# Patient Record
Sex: MALE | Race: WHITE | NOT HISPANIC OR LATINO
[De-identification: names, ages, dates, MRNs, and addresses within clinical notes are randomized per-mention and may not be internally consistent; named-entity substitution may affect disease eponyms.]

---

## 2017-06-28 ENCOUNTER — NON-APPOINTMENT (OUTPATIENT)
Age: 62
End: 2017-06-28

## 2017-06-28 ENCOUNTER — APPOINTMENT (OUTPATIENT)
Dept: FAMILY MEDICINE | Facility: CLINIC | Age: 62
End: 2017-06-28

## 2017-06-28 ENCOUNTER — LABORATORY RESULT (OUTPATIENT)
Age: 62
End: 2017-06-28

## 2017-06-28 VITALS
BODY MASS INDEX: 26.64 KG/M2 | HEIGHT: 66 IN | SYSTOLIC BLOOD PRESSURE: 130 MMHG | DIASTOLIC BLOOD PRESSURE: 74 MMHG | WEIGHT: 165.75 LBS

## 2017-06-28 DIAGNOSIS — D48.5 NEOPLASM OF UNCERTAIN BEHAVIOR OF SKIN: ICD-10-CM

## 2017-06-28 DIAGNOSIS — Z80.8 FAMILY HISTORY OF MALIGNANT NEOPLASM OF OTHER ORGANS OR SYSTEMS: ICD-10-CM

## 2017-06-28 DIAGNOSIS — R68.89 OTHER GENERAL SYMPTOMS AND SIGNS: ICD-10-CM

## 2017-06-28 DIAGNOSIS — Z82.49 FAMILY HISTORY OF ISCHEMIC HEART DISEASE AND OTHER DISEASES OF THE CIRCULATORY SYSTEM: ICD-10-CM

## 2017-06-28 LAB
BILIRUB UR QL STRIP: NORMAL
CLARITY UR: CLEAR
COLLECTION METHOD: NORMAL
GLUCOSE UR-MCNC: NORMAL
HCG UR QL: 0.2 EU/DL
HGB UR QL STRIP.AUTO: NORMAL
KETONES UR-MCNC: NORMAL
LEUKOCYTE ESTERASE UR QL STRIP: NORMAL
NITRITE UR QL STRIP: NORMAL
PH UR STRIP: 7
PROT UR STRIP-MCNC: NORMAL
SP GR UR STRIP: 1.02

## 2017-06-29 LAB
BASOPHILS # BLD AUTO: 0.02 K/UL
BASOPHILS NFR BLD AUTO: 0.3 %
EOSINOPHIL # BLD AUTO: 0.23 K/UL
EOSINOPHIL NFR BLD AUTO: 3 %
HCT VFR BLD CALC: 46.7 %
HGB BLD-MCNC: 14.6 G/DL
IMM GRANULOCYTES NFR BLD AUTO: 0.3 %
LYMPHOCYTES # BLD AUTO: 1.06 K/UL
LYMPHOCYTES NFR BLD AUTO: 13.7 %
MAN DIFF?: NORMAL
MCHC RBC-ENTMCNC: 28.7 PG
MCHC RBC-ENTMCNC: 31.3 GM/DL
MCV RBC AUTO: 91.7 FL
MONOCYTES # BLD AUTO: 0.74 K/UL
MONOCYTES NFR BLD AUTO: 9.6 %
NEUTROPHILS # BLD AUTO: 5.64 K/UL
NEUTROPHILS NFR BLD AUTO: 73.1 %
PLATELET # BLD AUTO: 295 K/UL
RBC # BLD: 5.09 M/UL
RBC # FLD: 14.1 %
T3RU NFR SERPL: 0.96 INDEX
THYROGLOB AB SERPL-ACNC: <20 IU/ML
THYROPEROXIDASE AB SERPL IA-ACNC: 15.9 IU/ML
TSH SERPL-ACNC: 2.15 UIU/ML
WBC # FLD AUTO: 7.71 K/UL

## 2017-07-14 ENCOUNTER — MEDICATION RENEWAL (OUTPATIENT)
Age: 62
End: 2017-07-14

## 2017-07-17 ENCOUNTER — APPOINTMENT (OUTPATIENT)
Dept: CARDIOLOGY | Facility: CLINIC | Age: 62
End: 2017-07-17

## 2017-07-17 VITALS
OXYGEN SATURATION: 97 % | DIASTOLIC BLOOD PRESSURE: 78 MMHG | WEIGHT: 168 LBS | BODY MASS INDEX: 27.12 KG/M2 | HEART RATE: 106 BPM | SYSTOLIC BLOOD PRESSURE: 161 MMHG

## 2017-07-25 ENCOUNTER — OUTPATIENT (OUTPATIENT)
Dept: OUTPATIENT SERVICES | Facility: HOSPITAL | Age: 62
LOS: 1 days | End: 2017-07-25
Payer: MEDICARE

## 2017-07-25 ENCOUNTER — APPOINTMENT (OUTPATIENT)
Dept: ULTRASOUND IMAGING | Facility: CLINIC | Age: 62
End: 2017-07-25

## 2017-07-25 DIAGNOSIS — Z82.49 FAMILY HISTORY OF ISCHEMIC HEART DISEASE AND OTHER DISEASES OF THE CIRCULATORY SYSTEM: ICD-10-CM

## 2017-07-25 PROCEDURE — 76700 US EXAM ABDOM COMPLETE: CPT

## 2017-07-28 ENCOUNTER — APPOINTMENT (OUTPATIENT)
Dept: CARDIOLOGY | Facility: CLINIC | Age: 62
End: 2017-07-28
Payer: MEDICARE

## 2017-07-28 PROCEDURE — 93306 TTE W/DOPPLER COMPLETE: CPT

## 2017-07-31 ENCOUNTER — RESULT REVIEW (OUTPATIENT)
Age: 62
End: 2017-07-31

## 2017-08-04 ENCOUNTER — EMERGENCY (EMERGENCY)
Facility: HOSPITAL | Age: 62
LOS: 1 days | Discharge: HOME | End: 2017-08-04
Attending: EMERGENCY MEDICINE | Admitting: EMERGENCY MEDICINE
Payer: MEDICARE

## 2017-08-04 VITALS
TEMPERATURE: 98 F | SYSTOLIC BLOOD PRESSURE: 124 MMHG | DIASTOLIC BLOOD PRESSURE: 76 MMHG | RESPIRATION RATE: 20 BRPM | HEART RATE: 108 BPM | OXYGEN SATURATION: 97 %

## 2017-08-04 VITALS
TEMPERATURE: 99 F | HEART RATE: 133 BPM | OXYGEN SATURATION: 95 % | WEIGHT: 167.99 LBS | RESPIRATION RATE: 21 BRPM | SYSTOLIC BLOOD PRESSURE: 123 MMHG | DIASTOLIC BLOOD PRESSURE: 67 MMHG | HEIGHT: 66 IN

## 2017-08-04 PROCEDURE — 99284 EMERGENCY DEPT VISIT MOD MDM: CPT

## 2017-08-04 PROCEDURE — 93005 ELECTROCARDIOGRAM TRACING: CPT

## 2017-08-04 PROCEDURE — 99283 EMERGENCY DEPT VISIT LOW MDM: CPT | Mod: 25

## 2017-08-04 PROCEDURE — 93010 ELECTROCARDIOGRAM REPORT: CPT

## 2017-08-04 RX ORDER — HYDROXYZINE HCL 10 MG
25 TABLET ORAL ONCE
Qty: 0 | Refills: 0 | Status: COMPLETED | OUTPATIENT
Start: 2017-08-04 | End: 2017-08-04

## 2017-08-04 RX ADMIN — Medication 25 MILLIGRAM(S): at 13:15

## 2017-08-04 NOTE — ED ADULT TRIAGE NOTE - CHIEF COMPLAINT QUOTE
patient c/o fall at home. as per EMS patient has been falling more frequently. patient was found with rapid heart rate. denies any chest pain/SOB. stated does feel heart beating a "little fast". Patient is alert and oriented x3, Aide is suppose to come to the Hospital

## 2017-08-04 NOTE — ED PROVIDER NOTE - CONSTITUTIONAL, MLM
normal... Well appearing, well nourished, awake, alert, oriented to person, place, time/situation and in MODERATE PSYCHIATRIC apparent distress.

## 2017-08-04 NOTE — ED PROVIDER NOTE - OBJECTIVE STATEMENT
62 YO MALE WITH ANXIETY ATTACK. PT WENT OUT TO GET THE MAIL AND TRIPPED AND FELL DOWN ONTO HIS KNEES. HE GOT ANXIOUS AND COULDN'T GET UP. NO OTHER TRAUMA TO PERSON. NO OTHER COMPALINTS

## 2017-08-04 NOTE — ED PROVIDER NOTE - PSYCHIATRIC, MLM
Alert and oriented to person, place, time/situation. ANXIOUS mood and affect. no apparent risk to self or others.

## 2017-08-04 NOTE — ED PROVIDER NOTE - CHPI ED SYMPTOMS NEG
no disorientation/no suicidal/no agitation/no weakness/no change in level of consciousness/no confusion/no homicidal/no paranoia/no hallucinations

## 2017-08-04 NOTE — ED PROVIDER NOTE - MEDICAL DECISION MAKING DETAILS
PT WITH ANXIETY PRESENTS WITH ANXIETY S/P TRIP AND FALL, LANDING ON KNEES. FEELS BETTER. HR UP. AIDE NOW PRESENT. WILL GIVE VISTARIL AND LIKELY DISCHARGE

## 2017-08-04 NOTE — ED ADULT NURSE NOTE - OBJECTIVE STATEMENT
patient was involved in fall to as per patient because of a panic attack. aide at the bedside, food and medication given

## 2017-08-11 ENCOUNTER — APPOINTMENT (OUTPATIENT)
Dept: FAMILY MEDICINE | Facility: CLINIC | Age: 62
End: 2017-08-11
Payer: MEDICARE

## 2017-08-11 VITALS
SYSTOLIC BLOOD PRESSURE: 143 MMHG | WEIGHT: 175 LBS | HEIGHT: 66 IN | DIASTOLIC BLOOD PRESSURE: 79 MMHG | BODY MASS INDEX: 28.12 KG/M2

## 2017-08-11 VITALS — DIASTOLIC BLOOD PRESSURE: 80 MMHG | SYSTOLIC BLOOD PRESSURE: 138 MMHG

## 2017-08-11 PROCEDURE — 99214 OFFICE O/P EST MOD 30 MIN: CPT

## 2017-08-11 RX ORDER — SIMVASTATIN 40 MG/1
40 TABLET, FILM COATED ORAL
Refills: 0 | Status: DISCONTINUED | COMMUNITY
End: 2017-08-11

## 2017-09-14 ENCOUNTER — APPOINTMENT (OUTPATIENT)
Dept: FAMILY MEDICINE | Facility: CLINIC | Age: 62
End: 2017-09-14
Payer: MEDICARE

## 2017-09-14 VITALS
HEIGHT: 66 IN | BODY MASS INDEX: 27.56 KG/M2 | DIASTOLIC BLOOD PRESSURE: 74 MMHG | WEIGHT: 171.5 LBS | SYSTOLIC BLOOD PRESSURE: 152 MMHG

## 2017-09-14 VITALS — DIASTOLIC BLOOD PRESSURE: 70 MMHG | SYSTOLIC BLOOD PRESSURE: 138 MMHG

## 2017-09-14 DIAGNOSIS — Z60.2 PROBLEMS RELATED TO LIVING ALONE: ICD-10-CM

## 2017-09-14 PROCEDURE — 99213 OFFICE O/P EST LOW 20 MIN: CPT | Mod: 25

## 2017-09-14 PROCEDURE — 90686 IIV4 VACC NO PRSV 0.5 ML IM: CPT

## 2017-09-14 PROCEDURE — 90688 IIV4 VACCINE SPLT 0.5 ML IM: CPT

## 2017-09-14 PROCEDURE — G0008: CPT

## 2017-09-14 RX ORDER — ATORVASTATIN CALCIUM 20 MG/1
20 TABLET, FILM COATED ORAL DAILY
Qty: 90 | Refills: 0 | Status: DISCONTINUED | COMMUNITY
Start: 2017-08-11 | End: 2017-09-14

## 2017-09-14 SDOH — SOCIAL STABILITY - SOCIAL INSECURITY: PROBLEMS RELATED TO LIVING ALONE: Z60.2

## 2017-09-18 PROBLEM — Z60.2 LIVING ALONE: Status: ACTIVE | Noted: 2017-09-18

## 2017-09-28 ENCOUNTER — APPOINTMENT (OUTPATIENT)
Dept: FAMILY MEDICINE | Facility: CLINIC | Age: 62
End: 2017-09-28

## 2017-11-16 ENCOUNTER — APPOINTMENT (OUTPATIENT)
Dept: FAMILY MEDICINE | Facility: CLINIC | Age: 62
End: 2017-11-16
Payer: MEDICARE

## 2017-11-16 VITALS
HEART RATE: 104 BPM | WEIGHT: 176 LBS | HEIGHT: 66 IN | OXYGEN SATURATION: 94 % | BODY MASS INDEX: 28.28 KG/M2 | DIASTOLIC BLOOD PRESSURE: 74 MMHG | SYSTOLIC BLOOD PRESSURE: 158 MMHG

## 2017-11-16 VITALS — SYSTOLIC BLOOD PRESSURE: 138 MMHG | DIASTOLIC BLOOD PRESSURE: 60 MMHG

## 2017-11-16 PROCEDURE — 99213 OFFICE O/P EST LOW 20 MIN: CPT

## 2017-11-16 RX ORDER — HYDROCHLOROTHIAZIDE 12.5 MG/1
12.5 CAPSULE ORAL DAILY
Qty: 30 | Refills: 0 | Status: COMPLETED | COMMUNITY
End: 2017-11-16

## 2018-01-09 ENCOUNTER — LABORATORY RESULT (OUTPATIENT)
Age: 63
End: 2018-01-09

## 2018-01-09 ENCOUNTER — APPOINTMENT (OUTPATIENT)
Dept: FAMILY MEDICINE | Facility: CLINIC | Age: 63
End: 2018-01-09
Payer: MEDICARE

## 2018-01-09 VITALS
HEART RATE: 107 BPM | DIASTOLIC BLOOD PRESSURE: 76 MMHG | SYSTOLIC BLOOD PRESSURE: 148 MMHG | WEIGHT: 182 LBS | HEIGHT: 66 IN | BODY MASS INDEX: 29.25 KG/M2 | OXYGEN SATURATION: 98 %

## 2018-01-09 DIAGNOSIS — Z92.29 PERSONAL HISTORY OF OTHER DRUG THERAPY: ICD-10-CM

## 2018-01-09 PROCEDURE — 99214 OFFICE O/P EST MOD 30 MIN: CPT | Mod: 25

## 2018-01-09 PROCEDURE — 36415 COLL VENOUS BLD VENIPUNCTURE: CPT

## 2018-01-09 PROCEDURE — 93000 ELECTROCARDIOGRAM COMPLETE: CPT

## 2018-01-12 LAB
ALBUMIN SERPL ELPH-MCNC: 4.7 G/DL
ALP BLD-CCNC: 112 U/L
ALT SERPL-CCNC: 23 U/L
ANION GAP SERPL CALC-SCNC: 16 MMOL/L
AST SERPL-CCNC: 12 U/L
BASOPHILS # BLD AUTO: 0.02 K/UL
BASOPHILS NFR BLD AUTO: 0.2 %
BILIRUB SERPL-MCNC: 0.7 MG/DL
BUN SERPL-MCNC: 21 MG/DL
CALCIUM SERPL-MCNC: 9.8 MG/DL
CHLORIDE SERPL-SCNC: 100 MMOL/L
CHOLEST SERPL-MCNC: 209 MG/DL
CHOLEST/HDLC SERPL: 3.9 RATIO
CO2 SERPL-SCNC: 26 MMOL/L
CREAT SERPL-MCNC: 0.78 MG/DL
EOSINOPHIL # BLD AUTO: 0.32 K/UL
EOSINOPHIL NFR BLD AUTO: 3.2 %
GLUCOSE SERPL-MCNC: 85 MG/DL
HCT VFR BLD CALC: 47.7 %
HDLC SERPL-MCNC: 54 MG/DL
HGB BLD-MCNC: 15.7 G/DL
IMM GRANULOCYTES NFR BLD AUTO: 0.6 %
LDLC SERPL CALC-MCNC: 129 MG/DL
LYMPHOCYTES # BLD AUTO: 1.1 K/UL
LYMPHOCYTES NFR BLD AUTO: 11 %
MAGNESIUM SERPL-MCNC: 2.3 MG/DL
MAN DIFF?: NORMAL
MCHC RBC-ENTMCNC: 29.6 PG
MCHC RBC-ENTMCNC: 32.9 GM/DL
MCV RBC AUTO: 89.8 FL
MONOCYTES # BLD AUTO: 1 K/UL
MONOCYTES NFR BLD AUTO: 10 %
NEUTROPHILS # BLD AUTO: 7.48 K/UL
NEUTROPHILS NFR BLD AUTO: 75 %
PLATELET # BLD AUTO: 343 K/UL
POTASSIUM SERPL-SCNC: 4.3 MMOL/L
PROT SERPL-MCNC: 8.1 G/DL
RBC # BLD: 5.31 M/UL
RBC # FLD: 13.8 %
SODIUM SERPL-SCNC: 142 MMOL/L
T3RU NFR SERPL: 1.01 INDEX
T4 FREE SERPL-MCNC: 1.4 NG/DL
THYROGLOB AB SERPL-ACNC: <20 IU/ML
THYROPEROXIDASE AB SERPL IA-ACNC: 11.7 IU/ML
TRIGL SERPL-MCNC: 128 MG/DL
TSH SERPL-ACNC: 2.67 UIU/ML
WBC # FLD AUTO: 9.98 K/UL

## 2018-02-15 ENCOUNTER — APPOINTMENT (OUTPATIENT)
Dept: FAMILY MEDICINE | Facility: CLINIC | Age: 63
End: 2018-02-15
Payer: MEDICARE

## 2018-02-15 VITALS
WEIGHT: 188 LBS | HEIGHT: 66 IN | BODY MASS INDEX: 30.22 KG/M2 | SYSTOLIC BLOOD PRESSURE: 118 MMHG | OXYGEN SATURATION: 91 % | DIASTOLIC BLOOD PRESSURE: 70 MMHG | HEART RATE: 88 BPM

## 2018-02-15 PROCEDURE — 99214 OFFICE O/P EST MOD 30 MIN: CPT

## 2018-02-20 ENCOUNTER — MEDICATION RENEWAL (OUTPATIENT)
Age: 63
End: 2018-02-20

## 2018-04-17 ENCOUNTER — APPOINTMENT (OUTPATIENT)
Dept: FAMILY MEDICINE | Facility: CLINIC | Age: 63
End: 2018-04-17
Payer: MEDICARE

## 2018-04-17 VITALS
BODY MASS INDEX: 29.73 KG/M2 | WEIGHT: 185 LBS | DIASTOLIC BLOOD PRESSURE: 81 MMHG | HEART RATE: 88 BPM | OXYGEN SATURATION: 96 % | HEIGHT: 66 IN | SYSTOLIC BLOOD PRESSURE: 144 MMHG

## 2018-04-17 VITALS — SYSTOLIC BLOOD PRESSURE: 130 MMHG | DIASTOLIC BLOOD PRESSURE: 62 MMHG

## 2018-04-17 PROCEDURE — 99214 OFFICE O/P EST MOD 30 MIN: CPT

## 2018-04-17 RX ORDER — ATENOLOL 50 MG/1
50 TABLET ORAL
Qty: 30 | Refills: 0 | Status: DISCONTINUED | COMMUNITY
Start: 2018-02-20 | End: 2018-04-17

## 2018-07-18 ENCOUNTER — APPOINTMENT (OUTPATIENT)
Dept: FAMILY MEDICINE | Facility: CLINIC | Age: 63
End: 2018-07-18
Payer: MEDICARE

## 2018-07-18 VITALS
DIASTOLIC BLOOD PRESSURE: 70 MMHG | RESPIRATION RATE: 16 BRPM | HEIGHT: 66 IN | OXYGEN SATURATION: 97 % | BODY MASS INDEX: 30.05 KG/M2 | WEIGHT: 187 LBS | SYSTOLIC BLOOD PRESSURE: 126 MMHG | HEART RATE: 81 BPM

## 2018-07-18 PROCEDURE — 99214 OFFICE O/P EST MOD 30 MIN: CPT | Mod: 25

## 2018-07-18 PROCEDURE — 36415 COLL VENOUS BLD VENIPUNCTURE: CPT

## 2018-07-18 NOTE — PHYSICAL EXAM
[No Acute Distress] : no acute distress [Normal Sclera/Conjunctiva] : normal sclera/conjunctiva [Normal Oropharynx] : the oropharynx was normal [Supple] : supple [No Lymphadenopathy] : no lymphadenopathy [No Respiratory Distress] : no respiratory distress  [Clear to Auscultation] : lungs were clear to auscultation bilaterally [Normal Rate] : normal [Rhythm Regular] : regular [Normal S1] : normal S1 [Normal S2] : normal S2 [II] : a grade 2 [Normal Anterior Cervical Nodes] : no anterior cervical lymphadenopathy [Normal Gait] : normal gait [Coordination Grossly Intact] : coordination grossly intact [No Focal Deficits] : no focal deficits [Normal Mood] : the mood was normal [de-identified] : Mild edema of the LEs b/l.

## 2018-07-18 NOTE — HISTORY OF PRESENT ILLNESS
[FreeTextEntry1] : Patient at office for follow up, requesting renewal on Atorvastatin. Per last office visit note, patient to have fasting BW. [de-identified] : Getting outpt PT 2x/wk. Feels more confident when walking since starting PT.

## 2018-07-18 NOTE — PLAN
[FreeTextEntry1] : Pt is stable.\par Pneumovax recommended. Guardian consent being sought. Paperwork provided for pt. Will administer at f/u.

## 2018-07-20 LAB
25(OH)D3 SERPL-MCNC: 19.6 NG/ML
ALBUMIN SERPL ELPH-MCNC: 4 G/DL
ALP BLD-CCNC: 86 U/L
ALT SERPL-CCNC: 19 U/L
ANION GAP SERPL CALC-SCNC: 12 MMOL/L
AST SERPL-CCNC: 16 U/L
BASOPHILS # BLD AUTO: 0.01 K/UL
BASOPHILS NFR BLD AUTO: 0.1 %
BILIRUB SERPL-MCNC: 0.5 MG/DL
BUN SERPL-MCNC: 17 MG/DL
CALCIUM SERPL-MCNC: 9.4 MG/DL
CHLORIDE SERPL-SCNC: 105 MMOL/L
CHOLEST SERPL-MCNC: 164 MG/DL
CHOLEST/HDLC SERPL: 3.7 RATIO
CO2 SERPL-SCNC: 21 MMOL/L
CREAT SERPL-MCNC: 0.67 MG/DL
EOSINOPHIL # BLD AUTO: 0.26 K/UL
EOSINOPHIL NFR BLD AUTO: 3.1 %
GLUCOSE SERPL-MCNC: 75 MG/DL
HCT VFR BLD CALC: 45 %
HDLC SERPL-MCNC: 44 MG/DL
HGB BLD-MCNC: 14.8 G/DL
IMM GRANULOCYTES NFR BLD AUTO: 0.4 %
LDLC SERPL CALC-MCNC: 106 MG/DL
LYMPHOCYTES # BLD AUTO: 0.86 K/UL
LYMPHOCYTES NFR BLD AUTO: 10.2 %
MAN DIFF?: NORMAL
MCHC RBC-ENTMCNC: 30.1 PG
MCHC RBC-ENTMCNC: 32.9 GM/DL
MCV RBC AUTO: 91.5 FL
MONOCYTES # BLD AUTO: 0.98 K/UL
MONOCYTES NFR BLD AUTO: 11.6 %
NEUTROPHILS # BLD AUTO: 6.31 K/UL
NEUTROPHILS NFR BLD AUTO: 74.6 %
PLATELET # BLD AUTO: 267 K/UL
POTASSIUM SERPL-SCNC: 4.7 MMOL/L
PROT SERPL-MCNC: 6.7 G/DL
RBC # BLD: 4.92 M/UL
RBC # FLD: 14.5 %
SODIUM SERPL-SCNC: 138 MMOL/L
TRIGL SERPL-MCNC: 68 MG/DL
TSH SERPL-ACNC: 2.73 UIU/ML
VIT B12 SERPL-MCNC: 541 PG/ML
WBC # FLD AUTO: 8.45 K/UL

## 2018-10-23 ENCOUNTER — APPOINTMENT (OUTPATIENT)
Dept: FAMILY MEDICINE | Facility: CLINIC | Age: 63
End: 2018-10-23
Payer: MEDICARE

## 2018-10-23 VITALS
HEIGHT: 66 IN | WEIGHT: 192.5 LBS | SYSTOLIC BLOOD PRESSURE: 140 MMHG | DIASTOLIC BLOOD PRESSURE: 74 MMHG | BODY MASS INDEX: 30.94 KG/M2

## 2018-10-23 PROCEDURE — G0008: CPT

## 2018-10-23 PROCEDURE — 90686 IIV4 VACC NO PRSV 0.5 ML IM: CPT

## 2018-10-23 PROCEDURE — 99214 OFFICE O/P EST MOD 30 MIN: CPT | Mod: 25

## 2018-10-23 NOTE — COUNSELING
[Weight management counseling provided] : Weight management [Healthy eating counseling provided] : healthy eating [Activity counseling provided] : activity [Needs reinforcement, provided] : Patient needs reinforcement on understanding lifestyle changes and  the steps needed to achieve self management goals and reinforcement was provided

## 2018-10-24 NOTE — HISTORY OF PRESENT ILLNESS
[None] : The patient is currently asymptomatic [Difficulty Breathing (Dyspnea)] : denies dyspnea [Chest Pain Or Discomfort] : denies chest pain [de-identified] : Patient presents to follow hypertension, and labs done three months ago. Patient requests renewals of medications.\par His cat  last night and he is upset.\par No cp, sob, palpitations. \par States he had pneumovax elsewhere within the past 3 months.\par States he is walking better since starting PT. Uses 4 pronged cane for ambulation.

## 2018-10-24 NOTE — HEALTH RISK ASSESSMENT
[No falls in past year] : Patient reported no falls in the past year [1] : 2) Feeling down, depressed, or hopeless for several days (1) [] : No [de-identified] : couple of beers weekly [FreeTextEntry1] : Patient reports his cat  yesterday. [[id number]] : 2

## 2018-10-24 NOTE — PHYSICAL EXAM
[No Acute Distress] : no acute distress [Normal Sclera/Conjunctiva] : normal sclera/conjunctiva [Normal Oropharynx] : the oropharynx was normal [Supple] : supple [No Lymphadenopathy] : no lymphadenopathy [No Respiratory Distress] : no respiratory distress  [Clear to Auscultation] : lungs were clear to auscultation bilaterally [Normal Rate] : normal [Rhythm Regular] : regular [Normal S1] : normal S1 [Normal S2] : normal S2 [II] : a grade 2 [Normal Anterior Cervical Nodes] : no anterior cervical lymphadenopathy [Normal Gait] : normal gait [Coordination Grossly Intact] : coordination grossly intact [No Focal Deficits] : no focal deficits [Normal Mood] : the mood was normal [de-identified] : Mild edema of the LEs b/l.

## 2019-01-23 ENCOUNTER — APPOINTMENT (OUTPATIENT)
Dept: FAMILY MEDICINE | Facility: CLINIC | Age: 64
End: 2019-01-23
Payer: MEDICARE

## 2019-01-23 VITALS
HEART RATE: 82 BPM | OXYGEN SATURATION: 96 % | BODY MASS INDEX: 31.34 KG/M2 | HEIGHT: 66 IN | DIASTOLIC BLOOD PRESSURE: 84 MMHG | SYSTOLIC BLOOD PRESSURE: 144 MMHG | WEIGHT: 195 LBS

## 2019-01-23 VITALS — DIASTOLIC BLOOD PRESSURE: 60 MMHG | SYSTOLIC BLOOD PRESSURE: 130 MMHG

## 2019-01-23 DIAGNOSIS — D49.2 NEOPLASM OF UNSPECIFIED BEHAVIOR OF BONE, SOFT TISSUE, AND SKIN: ICD-10-CM

## 2019-01-23 PROCEDURE — 99214 OFFICE O/P EST MOD 30 MIN: CPT

## 2019-01-23 RX ORDER — AMLODIPINE BESYLATE 5 MG/1
5 TABLET ORAL DAILY
Qty: 90 | Refills: 2 | Status: DISCONTINUED | COMMUNITY
Start: 2017-08-11 | End: 2019-01-23

## 2019-01-25 NOTE — COUNSELING
[Weight management counseling provided] : Weight management [Healthy eating counseling provided] : healthy eating [Activity counseling provided] : activity [Needs reinforcement, referred] : Patient needs reinforcement on understanding lifestyle changes and  the steps needed to achieve self management and patient was referred

## 2019-01-25 NOTE — HISTORY OF PRESENT ILLNESS
[FreeTextEntry1] : Pt here for hypertension follow up. \par He feels well.\par  [de-identified] : He gained 8 lbs over the past 5 months.

## 2019-01-25 NOTE — HEALTH RISK ASSESSMENT
[No falls in past year] : Patient reported no falls in the past year [] : No [de-identified] : 2 beers a week

## 2019-01-25 NOTE — PHYSICAL EXAM
[No Acute Distress] : no acute distress [Normal Sclera/Conjunctiva] : normal sclera/conjunctiva [Normal Oropharynx] : the oropharynx was normal [Supple] : supple [No Lymphadenopathy] : no lymphadenopathy [No Respiratory Distress] : no respiratory distress  [Clear to Auscultation] : lungs were clear to auscultation bilaterally [Normal Rate] : normal [Rhythm Regular] : regular [Normal S1] : normal S1 [Normal S2] : normal S2 [II] : a grade 2 [Normal Anterior Cervical Nodes] : no anterior cervical lymphadenopathy [Normal Gait] : normal gait [Coordination Grossly Intact] : coordination grossly intact [No Focal Deficits] : no focal deficits [Normal Mood] : the mood was normal [de-identified] : Fleshy growth right upper eyelid medially, and cystic growth right upper lateral eyelid.

## 2019-03-28 ENCOUNTER — APPOINTMENT (OUTPATIENT)
Dept: FAMILY MEDICINE | Facility: CLINIC | Age: 64
End: 2019-03-28
Payer: MEDICARE

## 2019-03-28 VITALS
OXYGEN SATURATION: 96 % | SYSTOLIC BLOOD PRESSURE: 140 MMHG | HEIGHT: 66 IN | WEIGHT: 195 LBS | BODY MASS INDEX: 31.34 KG/M2 | HEART RATE: 80 BPM | DIASTOLIC BLOOD PRESSURE: 80 MMHG

## 2019-03-28 DIAGNOSIS — Z92.29 PERSONAL HISTORY OF OTHER DRUG THERAPY: ICD-10-CM

## 2019-03-28 PROCEDURE — 36415 COLL VENOUS BLD VENIPUNCTURE: CPT

## 2019-03-28 PROCEDURE — 99214 OFFICE O/P EST MOD 30 MIN: CPT | Mod: 25

## 2019-03-28 NOTE — ASSESSMENT
[FreeTextEntry1] : Cardiology appt arranged for pt.\par All new medications and f/u instructions written for pt.\par Lose wt.\par Add HCTZ 12.5 mg/d.\par F/U 1 month.

## 2019-03-28 NOTE — HISTORY OF PRESENT ILLNESS
[FreeTextEntry1] : Patient is here to follow up on his Hyperlipidemia and Hypertension. \par Requesting refill on Atenolol and Atorvastatin.  [de-identified] : BP control has been borderline. He is overweight. Referred to nutritionist last visit but did not go.

## 2019-03-28 NOTE — PHYSICAL EXAM
[No Acute Distress] : no acute distress [Normal Sclera/Conjunctiva] : normal sclera/conjunctiva [Normal Oropharynx] : the oropharynx was normal [Supple] : supple [No Lymphadenopathy] : no lymphadenopathy [No Respiratory Distress] : no respiratory distress  [Clear to Auscultation] : lungs were clear to auscultation bilaterally [Normal Rate] : normal [Rhythm Regular] : regular [Normal S1] : normal S1 [Normal S2] : normal S2 [II] : a grade 2 [Normal Anterior Cervical Nodes] : no anterior cervical lymphadenopathy [Normal Gait] : normal gait [Coordination Grossly Intact] : coordination grossly intact [No Focal Deficits] : no focal deficits [Normal Mood] : the mood was normal [de-identified] : Fleshy growth right upper eyelid medially, and cystic growth right upper lateral eyelid.

## 2019-04-03 LAB
ALBUMIN SERPL ELPH-MCNC: 4.1 G/DL
ALP BLD-CCNC: 82 U/L
ALT SERPL-CCNC: 20 U/L
ANION GAP SERPL CALC-SCNC: 11 MMOL/L
AST SERPL-CCNC: 14 U/L
BASOPHILS # BLD AUTO: 0.04 K/UL
BASOPHILS NFR BLD AUTO: 0.4 %
BILIRUB SERPL-MCNC: 0.2 MG/DL
BUN SERPL-MCNC: 17 MG/DL
CALCIUM SERPL-MCNC: 9.5 MG/DL
CHLORIDE SERPL-SCNC: 106 MMOL/L
CHOLEST SERPL-MCNC: 144 MG/DL
CHOLEST/HDLC SERPL: 3.5 RATIO
CO2 SERPL-SCNC: 24 MMOL/L
CREAT SERPL-MCNC: 0.78 MG/DL
EOSINOPHIL # BLD AUTO: 0.31 K/UL
EOSINOPHIL NFR BLD AUTO: 2.8 %
GLUCOSE SERPL-MCNC: 95 MG/DL
HCT VFR BLD CALC: 50.7 %
HDLC SERPL-MCNC: 41 MG/DL
HGB BLD-MCNC: 15.4 G/DL
IMM GRANULOCYTES NFR BLD AUTO: 0.4 %
LDLC SERPL CALC-MCNC: 93 MG/DL
LYMPHOCYTES # BLD AUTO: 1.37 K/UL
LYMPHOCYTES NFR BLD AUTO: 12.2 %
MAN DIFF?: NORMAL
MCHC RBC-ENTMCNC: 28.8 PG
MCHC RBC-ENTMCNC: 30.4 GM/DL
MCV RBC AUTO: 94.8 FL
MONOCYTES # BLD AUTO: 1.14 K/UL
MONOCYTES NFR BLD AUTO: 10.1 %
NEUTROPHILS # BLD AUTO: 8.37 K/UL
NEUTROPHILS NFR BLD AUTO: 74.1 %
PLATELET # BLD AUTO: 290 K/UL
POTASSIUM SERPL-SCNC: 4.5 MMOL/L
PROT SERPL-MCNC: 7 G/DL
RBC # BLD: 5.35 M/UL
RBC # FLD: 13.9 %
SODIUM SERPL-SCNC: 141 MMOL/L
TRIGL SERPL-MCNC: 52 MG/DL
TSH SERPL-ACNC: 2.25 UIU/ML
WBC # FLD AUTO: 11.27 K/UL

## 2019-04-24 ENCOUNTER — INPATIENT (INPATIENT)
Facility: HOSPITAL | Age: 64
LOS: 6 days | Discharge: HOME | DRG: 565 | End: 2019-05-01
Attending: INTERNAL MEDICINE | Admitting: HOSPITALIST
Payer: MEDICARE

## 2019-04-24 VITALS
HEART RATE: 117 BPM | DIASTOLIC BLOOD PRESSURE: 61 MMHG | SYSTOLIC BLOOD PRESSURE: 103 MMHG | RESPIRATION RATE: 18 BRPM | WEIGHT: 195.11 LBS | TEMPERATURE: 99 F | HEIGHT: 66 IN | OXYGEN SATURATION: 91 %

## 2019-04-24 LAB
APPEARANCE UR: CLEAR — SIGNIFICANT CHANGE UP
BILIRUB UR-MCNC: NEGATIVE — SIGNIFICANT CHANGE UP
COLOR SPEC: YELLOW — SIGNIFICANT CHANGE UP
DIFF PNL FLD: ABNORMAL
GLUCOSE UR QL: NEGATIVE MG/DL — SIGNIFICANT CHANGE UP
HCT VFR BLD CALC: 45 % — SIGNIFICANT CHANGE UP (ref 42–52)
HGB BLD-MCNC: 15.2 G/DL — SIGNIFICANT CHANGE UP (ref 14–18)
KETONES UR-MCNC: ABNORMAL
LEUKOCYTE ESTERASE UR-ACNC: ABNORMAL
MCHC RBC-ENTMCNC: 30.1 PG — SIGNIFICANT CHANGE UP (ref 27–31)
MCHC RBC-ENTMCNC: 33.8 G/DL — SIGNIFICANT CHANGE UP (ref 32–36)
MCV RBC AUTO: 89.1 FL — SIGNIFICANT CHANGE UP (ref 80–94)
NITRITE UR-MCNC: NEGATIVE — SIGNIFICANT CHANGE UP
PCP SPEC-MCNC: SIGNIFICANT CHANGE UP
PH UR: 6 — SIGNIFICANT CHANGE UP (ref 5–8)
PLATELET # BLD AUTO: 247 K/UL — SIGNIFICANT CHANGE UP (ref 150–400)
PROT UR-MCNC: 100 MG/DL
RBC # BLD: 5.05 M/UL — SIGNIFICANT CHANGE UP (ref 4.6–6.2)
RBC # FLD: 14.2 % — SIGNIFICANT CHANGE UP (ref 11–15.6)
SP GR SPEC: 1.01 — SIGNIFICANT CHANGE UP (ref 1.01–1.02)
UROBILINOGEN FLD QL: NEGATIVE MG/DL — SIGNIFICANT CHANGE UP
WBC # BLD: 23.3 K/UL — HIGH (ref 4.8–10.8)
WBC # FLD AUTO: 23.3 K/UL — HIGH (ref 4.8–10.8)

## 2019-04-24 PROCEDURE — 99285 EMERGENCY DEPT VISIT HI MDM: CPT

## 2019-04-24 PROCEDURE — 70450 CT HEAD/BRAIN W/O DYE: CPT | Mod: 26

## 2019-04-24 PROCEDURE — 71045 X-RAY EXAM CHEST 1 VIEW: CPT | Mod: 26

## 2019-04-24 PROCEDURE — 93010 ELECTROCARDIOGRAM REPORT: CPT

## 2019-04-24 RX ORDER — SODIUM CHLORIDE 9 MG/ML
3 INJECTION INTRAMUSCULAR; INTRAVENOUS; SUBCUTANEOUS ONCE
Qty: 0 | Refills: 0 | Status: COMPLETED | OUTPATIENT
Start: 2019-04-24 | End: 2019-04-24

## 2019-04-24 RX ORDER — SODIUM CHLORIDE 9 MG/ML
1000 INJECTION INTRAMUSCULAR; INTRAVENOUS; SUBCUTANEOUS ONCE
Qty: 0 | Refills: 0 | Status: COMPLETED | OUTPATIENT
Start: 2019-04-24 | End: 2019-04-24

## 2019-04-24 RX ADMIN — SODIUM CHLORIDE 3 MILLILITER(S): 9 INJECTION INTRAMUSCULAR; INTRAVENOUS; SUBCUTANEOUS at 23:42

## 2019-04-24 RX ADMIN — SODIUM CHLORIDE 1000 MILLILITER(S): 9 INJECTION INTRAMUSCULAR; INTRAVENOUS; SUBCUTANEOUS at 23:42

## 2019-04-24 NOTE — ED ADULT TRIAGE NOTE - CHIEF COMPLAINT QUOTE
patient was found down for 2 hours at his parking lot of a [place] community. patient is alert and oriented x3, moving all extremities. denies any complaints at this time except unable to walk. patient appeared to be anxious.

## 2019-04-24 NOTE — ED PROVIDER NOTE - NS ED ROS FT
Review of Systems  •	CONSTITUTIONAL - no  fever, no diaphoresis, no weight change  •	SKIN - no rash  •	HEMATOLOGIC - no bleeding, no bruising  •	EYES - no eye pain, no blurred vision  •	ENT - no change in hearing, no pain  •	RESPIRATORY - no shortness of breath, no cough  •	CARDIAC - no chest pain, no palpitations  •	GI - no abd pain, no nausea, no vomiting, no diarrhea, no constipation, no bleeding  •	GENITO-URINARY - no discharge, no dysuria; no hematuria,   •	ENDO - no polydypsia, no polyurea, no heat/no cold intolerance  •	MUSCULOSKELETAL - no joint pain, no swelling, no redness  •	NEUROLOGIC - no weakness, no headache, no anesthesia, no paresthesias, +ATAXIA  •	PSYCH - no anxiety, non suicidal, non homicidal, no hallucination, no depression

## 2019-04-24 NOTE — ED PROVIDER NOTE - CLINICAL SUMMARY MEDICAL DECISION MAKING FREE TEXT BOX
Pt found down s/p fall, alcohol use, ataxia likely secondary to alcohol intoxication vs CNS pathology, will get priority CT, blood work, UA, IV fluid, and reeval

## 2019-04-24 NOTE — ED PROVIDER NOTE - PHYSICAL EXAMINATION
VITAL SIGNS: I have reviewed nursing notes and confirm.  CONSTITUTIONAL: Well-developed; well-nourished; in no acute distress.  SKIN: Skin exam is warm and dry, no acute rash.  HEAD: Normocephalic; atraumatic.  EYES: PERRL, EOM intact; conjunctiva and sclera clear.  ENT: No nasal discharge; airway clear. Throat clear.  NECK: Supple; non tender.    CARD: S1, S2 normal; +systolic murmur, gallops, or rubs. tachycardic rate and rhythm.  RESP: No wheezes,  no rales or rhonchi. tachypneic   ABD:  soft; non-distended; non-tender;   EXT: Normal ROM. No clubbing, cyanosis or edema.  NEURO: Alert, oriented. Grossly unremarkable. No focal deficits. no facial droop, moves all extremities,  no pronator drift, finger-to-nose wnl, ataxic gait, but able to ambulate with walker  PSYCH: Cooperative, appropriate. VITAL SIGNS: I have reviewed nursing notes and confirm.  CONSTITUTIONAL: Well-developed; well-nourished; in no acute distress.  SKIN: Skin exam is warm and dry, no acute rash.  HEAD: Normocephalic; atraumatic.  EYES: PERRL, EOM intact; conjunctiva and sclera clear.  ENT: No nasal discharge; airway clear. Throat clear.  NECK: Supple; non tender.    CARD: S1, S2 normal; +systolic murmur, gallops, or rubs. tachycardic rate and rhythm.  RESP: No wheezes,  no rales or rhonchi. tachypneic   ABD:  soft; non-distended; non-tender;   EXT: Normal ROM. No clubbing, cyanosis or edema.  NEURO: Alert, oriented. Grossly unremarkable. No focal deficits. no facial droop, moves all extremities,  no pronator drift, , ataxic gait, but able to ambulate with walker  PSYCH: Cooperative, appropriate.

## 2019-04-24 NOTE — ED PROVIDER NOTE - PROGRESS NOTE DETAILS
patient had elevated trop, no chest pain. [place] [facility] consulted. ASA given. unclear source of elevated wbc and troponin. CTA negative. CT abdomen negative. I spoke to hospitalist for admission.

## 2019-04-24 NOTE — ED PROVIDER NOTE - OBJECTIVE STATEMENT
64 y/o M, with hx of HTN and HLD, presents to the ED by EMS s/p fall, this evening.  Pt states that he was walking through the parking lot of his apartment complex, when he fell.  Pt states that he was walking with assistance from his walker.  Denies head trauma or LOC.  Pt states that he did not want to hit his life alert, but was unable to get up on his own.  Pt was on the floor for approximately 2 hrs.  Denies similar sx in the past.  Upon arrival to the ED, pt states "I feel very anxious because I fell and it scared me".   Admits to drinking 1 beer today.  States that he drinks 1 beer 2x/week.  Denies illicit drug use.  Denies fever, chills, chest pain, cough, SOB, abd pain, N/V/D, back pain, neck pain, numbness, tingling, LOC, or HA.

## 2019-04-25 ENCOUNTER — APPOINTMENT (OUTPATIENT)
Dept: FAMILY MEDICINE | Facility: CLINIC | Age: 64
End: 2019-04-25

## 2019-04-25 DIAGNOSIS — R74.8 ABNORMAL LEVELS OF OTHER SERUM ENZYMES: ICD-10-CM

## 2019-04-25 LAB
ALBUMIN SERPL ELPH-MCNC: 3.9 G/DL — SIGNIFICANT CHANGE UP (ref 3.3–5.2)
ALP SERPL-CCNC: 91 U/L — SIGNIFICANT CHANGE UP (ref 40–120)
ALT FLD-CCNC: 25 U/L — SIGNIFICANT CHANGE UP
AMPHET UR-MCNC: NEGATIVE — SIGNIFICANT CHANGE UP
ANION GAP SERPL CALC-SCNC: 14 MMOL/L — SIGNIFICANT CHANGE UP (ref 5–17)
ANION GAP SERPL CALC-SCNC: 19 MMOL/L — HIGH (ref 5–17)
APTT BLD: 25.3 SEC — LOW (ref 27.5–36.3)
AST SERPL-CCNC: 28 U/L — SIGNIFICANT CHANGE UP
BACTERIA # UR AUTO: ABNORMAL
BARBITURATES UR SCN-MCNC: NEGATIVE — SIGNIFICANT CHANGE UP
BASOPHILS # BLD AUTO: 0 K/UL — SIGNIFICANT CHANGE UP (ref 0–0.2)
BASOPHILS NFR BLD AUTO: 0.1 % — SIGNIFICANT CHANGE UP (ref 0–2)
BENZODIAZ UR-MCNC: NEGATIVE — SIGNIFICANT CHANGE UP
BILIRUB SERPL-MCNC: 0.6 MG/DL — SIGNIFICANT CHANGE UP (ref 0.4–2)
BUN SERPL-MCNC: 26 MG/DL — HIGH (ref 8–20)
BUN SERPL-MCNC: 30 MG/DL — HIGH (ref 8–20)
CALCIUM SERPL-MCNC: 8.1 MG/DL — LOW (ref 8.6–10.2)
CALCIUM SERPL-MCNC: 9.5 MG/DL — SIGNIFICANT CHANGE UP (ref 8.6–10.2)
CHLORIDE SERPL-SCNC: 104 MMOL/L — SIGNIFICANT CHANGE UP (ref 98–107)
CHLORIDE SERPL-SCNC: 109 MMOL/L — HIGH (ref 98–107)
CK MB CFR SERPL CALC: 15.5 NG/ML — HIGH (ref 0–6.7)
CK MB CFR SERPL CALC: 40.5 NG/ML — HIGH (ref 0–6.7)
CK MB CFR SERPL CALC: 42.9 NG/ML — HIGH (ref 0–6.7)
CK SERPL-CCNC: 2158 U/L — HIGH (ref 30–200)
CK SERPL-CCNC: 8048 U/L — HIGH (ref 30–200)
CK SERPL-CCNC: CRITICAL HIGH U/L (ref 30–200)
CO2 SERPL-SCNC: 19 MMOL/L — LOW (ref 22–29)
CO2 SERPL-SCNC: 19 MMOL/L — LOW (ref 22–29)
COCAINE METAB.OTHER UR-MCNC: NEGATIVE — SIGNIFICANT CHANGE UP
CREAT SERPL-MCNC: 1.05 MG/DL — SIGNIFICANT CHANGE UP (ref 0.5–1.3)
CREAT SERPL-MCNC: 1.39 MG/DL — HIGH (ref 0.5–1.3)
EOSINOPHIL # BLD AUTO: 0 K/UL — SIGNIFICANT CHANGE UP (ref 0–0.5)
EOSINOPHIL # BLD AUTO: 0 K/UL — SIGNIFICANT CHANGE UP (ref 0–0.5)
EOSINOPHIL NFR BLD AUTO: 0 % — SIGNIFICANT CHANGE UP (ref 0–5)
EOSINOPHIL NFR BLD AUTO: 0 % — SIGNIFICANT CHANGE UP (ref 0–5)
EPI CELLS # UR: NEGATIVE — SIGNIFICANT CHANGE UP
ETHANOL SERPL-MCNC: <10 MG/DL — SIGNIFICANT CHANGE UP
GLUCOSE SERPL-MCNC: 102 MG/DL — SIGNIFICANT CHANGE UP (ref 70–115)
GLUCOSE SERPL-MCNC: 95 MG/DL — SIGNIFICANT CHANGE UP (ref 70–115)
HCT VFR BLD CALC: 42.8 % — SIGNIFICANT CHANGE UP (ref 42–52)
HGB BLD-MCNC: 14 G/DL — SIGNIFICANT CHANGE UP (ref 14–18)
INR BLD: 1.03 RATIO — SIGNIFICANT CHANGE UP (ref 0.88–1.16)
LACTATE BLDV-MCNC: 1.5 MMOL/L — SIGNIFICANT CHANGE UP (ref 0.5–2)
LIDOCAIN IGE QN: 30 U/L — SIGNIFICANT CHANGE UP (ref 22–51)
LYMPHOCYTES # BLD AUTO: 0.6 K/UL — LOW (ref 1–4.8)
LYMPHOCYTES # BLD AUTO: 1.5 K/UL — SIGNIFICANT CHANGE UP (ref 1–4.8)
LYMPHOCYTES # BLD AUTO: 4.1 % — LOW (ref 20–55)
LYMPHOCYTES # BLD AUTO: 6.5 % — LOW (ref 20–55)
MAGNESIUM SERPL-MCNC: 2.7 MG/DL — HIGH (ref 1.6–2.6)
MAGNESIUM SERPL-MCNC: 3.1 MG/DL — HIGH (ref 1.6–2.6)
MCHC RBC-ENTMCNC: 29.4 PG — SIGNIFICANT CHANGE UP (ref 27–31)
MCHC RBC-ENTMCNC: 32.7 G/DL — SIGNIFICANT CHANGE UP (ref 32–36)
MCV RBC AUTO: 89.9 FL — SIGNIFICANT CHANGE UP (ref 80–94)
METHADONE UR-MCNC: NEGATIVE — SIGNIFICANT CHANGE UP
MONOCYTES # BLD AUTO: 0.8 K/UL — SIGNIFICANT CHANGE UP (ref 0–0.8)
MONOCYTES # BLD AUTO: 1.5 K/UL — HIGH (ref 0–0.8)
MONOCYTES NFR BLD AUTO: 10 % — SIGNIFICANT CHANGE UP (ref 3–10)
MONOCYTES NFR BLD AUTO: 3.3 % — SIGNIFICANT CHANGE UP (ref 3–10)
NEUTROPHILS # BLD AUTO: 12.7 K/UL — HIGH (ref 1.8–8)
NEUTROPHILS # BLD AUTO: 20.8 K/UL — HIGH (ref 1.8–8)
NEUTROPHILS NFR BLD AUTO: 85.6 % — HIGH (ref 37–73)
NEUTROPHILS NFR BLD AUTO: 89.5 % — HIGH (ref 37–73)
NT-PROBNP SERPL-SCNC: 393 PG/ML — HIGH (ref 0–300)
OPIATES UR-MCNC: NEGATIVE — SIGNIFICANT CHANGE UP
PCP UR-MCNC: NEGATIVE — SIGNIFICANT CHANGE UP
PHOSPHATE SERPL-MCNC: 3.3 MG/DL — SIGNIFICANT CHANGE UP (ref 2.4–4.7)
PLAT MORPH BLD: NORMAL — SIGNIFICANT CHANGE UP
PLATELET # BLD AUTO: 206 K/UL — SIGNIFICANT CHANGE UP (ref 150–400)
POTASSIUM SERPL-MCNC: 3.7 MMOL/L — SIGNIFICANT CHANGE UP (ref 3.5–5.3)
POTASSIUM SERPL-MCNC: 4.2 MMOL/L — SIGNIFICANT CHANGE UP (ref 3.5–5.3)
POTASSIUM SERPL-SCNC: 3.7 MMOL/L — SIGNIFICANT CHANGE UP (ref 3.5–5.3)
POTASSIUM SERPL-SCNC: 4.2 MMOL/L — SIGNIFICANT CHANGE UP (ref 3.5–5.3)
PROT SERPL-MCNC: 6.9 G/DL — SIGNIFICANT CHANGE UP (ref 6.6–8.7)
PROTHROM AB SERPL-ACNC: 11.9 SEC — SIGNIFICANT CHANGE UP (ref 10–12.9)
RBC # BLD: 4.76 M/UL — SIGNIFICANT CHANGE UP (ref 4.6–6.2)
RBC # FLD: 14.2 % — SIGNIFICANT CHANGE UP (ref 11–15.6)
RBC BLD AUTO: NORMAL — SIGNIFICANT CHANGE UP
RBC CASTS # UR COMP ASSIST: ABNORMAL /HPF (ref 0–4)
SODIUM SERPL-SCNC: 142 MMOL/L — SIGNIFICANT CHANGE UP (ref 135–145)
SODIUM SERPL-SCNC: 142 MMOL/L — SIGNIFICANT CHANGE UP (ref 135–145)
THC UR QL: NEGATIVE — SIGNIFICANT CHANGE UP
TROPONIN T SERPL-MCNC: 0.17 NG/ML — HIGH (ref 0–0.06)
TROPONIN T SERPL-MCNC: 0.17 NG/ML — HIGH (ref 0–0.06)
WBC # BLD: 14.8 K/UL — HIGH (ref 4.8–10.8)
WBC # FLD AUTO: 14.8 K/UL — HIGH (ref 4.8–10.8)
WBC UR QL: SIGNIFICANT CHANGE UP

## 2019-04-25 PROCEDURE — 93010 ELECTROCARDIOGRAM REPORT: CPT

## 2019-04-25 PROCEDURE — 74177 CT ABD & PELVIS W/CONTRAST: CPT | Mod: 26

## 2019-04-25 PROCEDURE — 93306 TTE W/DOPPLER COMPLETE: CPT | Mod: 26

## 2019-04-25 PROCEDURE — 71275 CT ANGIOGRAPHY CHEST: CPT | Mod: 26

## 2019-04-25 PROCEDURE — 99223 1ST HOSP IP/OBS HIGH 75: CPT | Mod: GC

## 2019-04-25 PROCEDURE — 12345: CPT | Mod: NC

## 2019-04-25 PROCEDURE — 99223 1ST HOSP IP/OBS HIGH 75: CPT

## 2019-04-25 RX ORDER — ENOXAPARIN SODIUM 100 MG/ML
40 INJECTION SUBCUTANEOUS DAILY
Qty: 0 | Refills: 0 | Status: DISCONTINUED | OUTPATIENT
Start: 2019-04-25 | End: 2019-05-01

## 2019-04-25 RX ORDER — ACETAMINOPHEN 500 MG
650 TABLET ORAL EVERY 6 HOURS
Qty: 0 | Refills: 0 | Status: DISCONTINUED | OUTPATIENT
Start: 2019-04-25 | End: 2019-05-01

## 2019-04-25 RX ORDER — AMLODIPINE BESYLATE 2.5 MG/1
10 TABLET ORAL DAILY
Qty: 0 | Refills: 0 | Status: DISCONTINUED | OUTPATIENT
Start: 2019-04-25 | End: 2019-05-01

## 2019-04-25 RX ORDER — SODIUM CHLORIDE 9 MG/ML
1000 INJECTION INTRAMUSCULAR; INTRAVENOUS; SUBCUTANEOUS ONCE
Qty: 0 | Refills: 0 | Status: COMPLETED | OUTPATIENT
Start: 2019-04-25 | End: 2019-04-25

## 2019-04-25 RX ORDER — SODIUM CHLORIDE 9 MG/ML
1000 INJECTION INTRAMUSCULAR; INTRAVENOUS; SUBCUTANEOUS
Qty: 0 | Refills: 0 | Status: DISCONTINUED | OUTPATIENT
Start: 2019-04-25 | End: 2019-04-26

## 2019-04-25 RX ORDER — ATORVASTATIN CALCIUM 80 MG/1
40 TABLET, FILM COATED ORAL AT BEDTIME
Qty: 0 | Refills: 0 | Status: DISCONTINUED | OUTPATIENT
Start: 2019-04-25 | End: 2019-04-25

## 2019-04-25 RX ORDER — SODIUM CHLORIDE 9 MG/ML
1000 INJECTION INTRAMUSCULAR; INTRAVENOUS; SUBCUTANEOUS
Qty: 0 | Refills: 0 | Status: DISCONTINUED | OUTPATIENT
Start: 2019-04-25 | End: 2019-04-25

## 2019-04-25 RX ORDER — PIPERACILLIN AND TAZOBACTAM 4; .5 G/20ML; G/20ML
3.38 INJECTION, POWDER, LYOPHILIZED, FOR SOLUTION INTRAVENOUS ONCE
Qty: 0 | Refills: 0 | Status: DISCONTINUED | OUTPATIENT
Start: 2019-04-25 | End: 2019-04-25

## 2019-04-25 RX ORDER — ASPIRIN/CALCIUM CARB/MAGNESIUM 324 MG
324 TABLET ORAL ONCE
Qty: 0 | Refills: 0 | Status: COMPLETED | OUTPATIENT
Start: 2019-04-25 | End: 2019-04-25

## 2019-04-25 RX ADMIN — Medication 324 MILLIGRAM(S): at 01:54

## 2019-04-25 RX ADMIN — SODIUM CHLORIDE 1000 MILLILITER(S): 9 INJECTION INTRAMUSCULAR; INTRAVENOUS; SUBCUTANEOUS at 01:16

## 2019-04-25 RX ADMIN — ENOXAPARIN SODIUM 40 MILLIGRAM(S): 100 INJECTION SUBCUTANEOUS at 11:29

## 2019-04-25 RX ADMIN — SODIUM CHLORIDE 125 MILLILITER(S): 9 INJECTION INTRAMUSCULAR; INTRAVENOUS; SUBCUTANEOUS at 18:01

## 2019-04-25 RX ADMIN — SODIUM CHLORIDE 200 MILLILITER(S): 9 INJECTION INTRAMUSCULAR; INTRAVENOUS; SUBCUTANEOUS at 05:00

## 2019-04-25 NOTE — PROGRESS NOTE ADULT - SUBJECTIVE AND OBJECTIVE BOX
JUAN C BARR    [id number]    63y      Male    CC: S/P fall and unsteady gait    INTERVAL HPI/OVERNIGHT EVENTS:  64 y/o M, with PMH significant for HTN and HLD, presents to the ED BIBA, s/p fall, around 7pm. Patient is a poor historian, states was walking through the parking lot of his apartment complex, when he stepped in a uneven surface, lost his balance and fell, denies any head trauma or LOC. Patient reports being unable to get on is own and was on the ground for approximately 2 hours, before a neighbor call an EMS. Patient admits having drinking alcohol (1 beer) yesterday prior to the fall. 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  Denies fever, chills, chest pain, cough, SOB, abd pain, N/V/D, back pain, neck pain, numbness, tingling, LOC, or HA.      today pt denies any sob, no chest pain no n/v     REVIEW OF SYSTEMS:    CONSTITUTIONAL: No fever, + fatigue  RESPIRATORY: No cough, wheezing, hemoptysis; No shortness of breath  CARDIOVASCULAR: No chest pain, palpitations  GASTROINTESTINAL: No abdominal or epigastric pain. No nausea, vomiting      Vital Signs Last 24 Hrs  T(C): 36.4 (2019 12:23), Max: 37.7 (2019 00:00)  T(F): 97.5 (2019 12:23), Max: 99.8 (2019 00:00)  HR: 98 (2019 12:23) (94 - 117)  BP: 101/50 (2019 12:23) (101/50 - 148/58)  BP(mean): --  RR: 18 (2019 12:23) (18 - 20)  SpO2: 93% (2019 12:23) (91% - 96%)    PHYSICAL EXAM:    GENERAL: NAD, well-groomed  HEENT: PERRL, +EOMI  CHEST/LUNG: Clear to auscultation bilaterally; No wheezing  HEART: S1S2+, Regular rate and rhythm; No murmurs  ABDOMEN: Soft, Nontender, Nondistended; Bowel sounds present  EXTREMITIES:  2+ Peripheral Pulses, No clubbing, cyanosis, or edema + b/l lower ext knee bruises and skin scarping        LABS:                        14.0   14.8  )-----------( 206      ( 2019 05:30 )             42.8     04    142  |  109<H>  |  26.0<H>  ----------------------------<  95  3.7   |  19.0<L>  |  1.05    Ca    8.1<L>      2019 05:30  Phos  3.3     -  Mg     2.7         TPro  6.9  /  Alb  3.9  /  TBili  0.6  /  DBili  x   /  AST  28  /  ALT  25  /  AlkPhos  91      PT/INR - ( 2019 23:38 )   PT: 11.9 sec;   INR: 1.03 ratio         PTT - ( 2019 23:38 )  PTT:25.3 sec  Urinalysis Basic - ( 2019 23:38 )    Color: Yellow / Appearance: Clear / S.015 / pH: x  Gluc: x / Ketone: Trace  / Bili: Negative / Urobili: Negative mg/dL   Blood: x / Protein: 100 mg/dL / Nitrite: Negative   Leuk Esterase: Trace / RBC: 3-5 /HPF / WBC 0-2   Sq Epi: x / Non Sq Epi: Negative / Bacteria: Occasional          MEDICATIONS  (STANDING):  amLODIPine   Tablet 10 milliGRAM(s) Oral daily  enoxaparin Injectable 40 milliGRAM(s) SubCutaneous daily  sodium chloride 0.9%. 1000 milliLiter(s) (125 mL/Hr) IV Continuous <Continuous>    MEDICATIONS  (PRN):  acetaminophen  Suppository .. 650 milliGRAM(s) Rectal every 6 hours PRN Temp greater or equal to 38C (100.4F), Moderate Pain (4 - 6)      RADIOLOGY & ADDITIONAL TESTS:  CT chest/abd/pelvis:  Chest:  Vascular: Evaluation of the subsegmental pulmonary arteries are limited   due to motion artifact. There is no pulmonary embolism in the main, lobar   or segmental pulmonary arteries.    Lungs: No pulmonary nodules, masses or consolidations are seen. There is   no evidence of a pleural effusion. Dependent, subsegmental atelectasis is   noted. Left lower lobe subsegmental atelectasis is seen. The left   hemidiaphragm is elevated.    Airways: The trachea and main bronchi are patent.    Mediastinum: There are no significant mediastinal, hilar or axillary   adenopathy. The heart size is enlarged. There is no pericardial effusion.   Mild coronary artery calcifications are noted.     Miscellaneous: The visualized thyroid is unremarkable.     Bones: Mild degenerative changes of the lower thoracic spine are seen. A   partially visualized left shoulder arthroplasty is noted. An old anterior   left third rib fracture deformity is seen. No acute rib fracture is   demonstrated.    Abdomen:  Organs: Layering gallstone are noted. Bilateral renal cysts are seen.   Subcentimeter hypodense lesions are seen in both kidneys which are too   small for accurate characterization. The pancreas is atrophic. The liver,   spleen and adrenal glands are unremarkable.    Gastrointestinal tract: There is no evidence of a bowel obstruction or   inflammation. Colonic diverticulosis is seen without evidence of   diverticulitis. The appendix is unremarkable.    Vascular: There is no abdominal aortic aneurysm.    Pelvis: A prostate is heterogeneously enlarged measuring up to 5.3 cm.   The urinary bladder and seminal vesicles are unremarkable. Small   bilateral fat-containing inguinal hernias are noted.    Miscellaneous: There is no significant abdominal or pelvic   lymphadenopathy. No free air or free fluid is demonstrated.    Bones: Degenerative changes of the spine are seen.    IMPRESSION:  1. Evaluation of the subsegmental pulmonary arteries are limited due to   motion artifact. No pulmonary embolism in the main, lobar or segmental   pulmonary arteries.  2. No bowel obstruction or inflammation.  3. No acute intrathoracic, intra-abdominal or intrapelvic trauma.    VINI NICOLE   This document has been electronically signed. 2019  2:54AM

## 2019-04-25 NOTE — H&P ADULT - ASSESSMENT
64 y/o M, with PMH significant for HTN and HLD, presents to the ED BIBA, s/p mechanical fall, admitted for Rhabdomyolysis in the setting of mechanical fall      Rhabdomyolysis secondary to mechanical fall  -Patient lying in the ground for about 2hours unable to get up on is own  -Leucocytosis likely reactive to Rhabdo, no source of infection at this time  -Elevated troponin x1 secondary to Rhabdo, EKG w/o ST changes and patient does not reports chest pain  -S/p 2000cc IV fluids  -WIll continue fluid hydration NS @ 200cc/h  -S/p Zosyn x1 at ED, will d/c Abx, patient afebrile, no clear source of infection at this time    Mechanical fall  -Patient reports several episodes in the past, last fall a month ago  -Unclear why patient has gait instability   -Uses a walker in a regular basis  -Fall precautions    RONAK  -Likely secondary to Rhabdo   -Patient look clinically dehydrated  -Unknown base line renal function   -Will continue with IV hydration     HTN  -Goal <140/90  -C/w Amlodipine 10mg PO daily, with holding parameters     HLD  -C/w Atorvastatin 40mg PO daily (home regimen)     Prophylactic measures   -DVT Prophylaxis: Lovenox 40 mg SC daily     Dispo   -Likely to be discharge in next 24 hours if medically stable 62 y/o M, with PMH significant for HTN and HLD, presents to the ED BIBA, s/p mechanical fall, admitted for Rhabdomyolysis in the setting of mechanical fall      Rhabdomyolysis secondary to mechanical fall  -Patient lying in the ground for about 2hours unable to get up on is own  -Leucocytosis likely reactive to Rhabdo, no source of infection at this time  -Elevated troponin x1 secondary to Rhabdo, EKG w/o ST changes and patient does not reports chest pain  -S/p 2000cc IV fluids  -WIll continue fluid hydration NS @ 200cc/h  -S/p Zosyn x1 at ED, will d/c Abx, patient afebrile, no clear source of infection at this time    Mechanical fall  -Patient reports several episodes in the past, last fall a month ago  -Unclear why patient has gait instability   -Uses a walker in a regular basis  -Fall precautions    RONAK  -Likely secondary to Rhabdo   -Patient look clinically dehydrated  -Unknown base line renal function   -Will continue with IV hydration     HTN  -Goal <140/90  -C/w Amlodipine 10mg PO daily, with holding parameters     HLD  -hold Atorvastatin 40mg PO daily (home regimen)  -restart on discharge home    Prophylactic measures   -DVT Prophylaxis: Lovenox 40 mg SC daily     Dispo   -Likely to be discharge in next 24 hours if medically stable

## 2019-04-25 NOTE — H&P ADULT - NSHPPHYSICALEXAM_GEN_ALL_CORE
CONSTITUTIONAL: Well-developed; well-nourished; in no acute distress.  HEENT: Normocephalic; atraumatic, PERRL, EOM intact; conjunctiva and sclera clear, no nasal discharge; airway clear. dry mucoses membranes   NECK: Supple; non tender.    CARD: S1, S2 normal; +systolic murmur, gallops, or rubs. tachycardic rate and rhythm.  RESP: No wheezes,  no rales or rhonchi. tachypneic   ABD:  soft; non-distended; non-tender;   EXT: Normal ROM. No clubbing, cyanosis or edema.  NEURO: Alert, oriented. CN II-XII grossly intact. No focal deficits, ataxic gait, but able to ambulate with walker, tremor present in upper extremities   PSYCH: Cooperative, appropriate.  SKIN: Skin exam is warm and dry, no acute rash.

## 2019-04-25 NOTE — PHYSICAL THERAPY INITIAL EVALUATION ADULT - ADDITIONAL COMMENTS
Pt is anxious, reports living alone in an apartment with 1 step to enter.  Has HHA, 6hrs./day and 5 days/week. Reports being Modified Independent with all PTA, with RW.

## 2019-04-25 NOTE — CONSULT NOTE ADULT - PROBLEM SELECTOR RECOMMENDATION 9
-NSTEMI vs demand ischemia secondary to tachycardia and infection  -trend cardiac markers  -telemetry monitoring  -echocardiogram  - mg  -repeat EKG in am  -CT angio to r/o PE -likely due to tachycardia, RONAK, rhabdomyolysis and possible infection  -trend cardiac markers  -telemetry monitoring  -echocardiogram  - mg  -repeat EKG in am  -CT angio to r/o PE-negative

## 2019-04-25 NOTE — ED ADULT NURSE NOTE - OBJECTIVE STATEMENT
Patient presented to ED secondary to fall patient noted to have redness at bilateral knee. Denies headache and neck tenderness

## 2019-04-25 NOTE — ED ADULT NURSE NOTE - NSIMPLEMENTINTERV_GEN_ALL_ED
Implemented All Universal Safety Interventions:  [place] to call system. Call bell, personal items and telephone within reach. Instruct patient to call for assistance. Room bathroom lighting operational. Non-slip footwear when patient is off stretcher. Physically safe environment: no spills, clutter or unnecessary equipment. Stretcher in lowest position, wheels locked, appropriate side rails in place.

## 2019-04-25 NOTE — PROGRESS NOTE ADULT - ASSESSMENT
62 y/o M, with PMH significant for HTN and HLD, presents to the ED BIBA, s/p fall, around 7pm. Patient is a poor historian, states was walking through the parking lot of his apartment complex, when he stepped in a uneven surface, lost his balance and fell, denies any head trauma or LOC. Patient reports being unable to get on is own and was on the ground for approximately 2 hours, before a neighbor call an EMS. Patient admits having drinking alcohol (1 beer) yesterday prior to the fall. 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  Denies fever, chills, chest pain, cough, SOB, abd pain, N/V/D, back pain, neck pain, numbness, tingling, LOC, or HA.    1) Acute Rhabdomyolysis secondary to mechanical fall  -Patient lying in the ground for about 2hours unable to get up on is own  -Leucocytosis likely reactive to Rhabdo, no source of infection at this time: improving  -Elevated troponin x1 secondary to Rhabdo, EKG w/o ST changes and patient does not reports chest pain  -S/p 2000cc IV fluids  -continue fluid hydration NS @ 125cc/h  -repeat CK level in am    2) Mechanical fall  -Patient reports several episodes in the past, last fall a month ago  -Unclear why patient has gait instability   -Uses a walker in a regular basis  -Fall precautions  -PT eval rec [facility]    3) RONAK  -Likely secondary to Rhabdo and Dehydration  -Will continue with IV hydration   -monitor Cr level, seems to be impriving    4) HTN  -C/w Amlodipine and monitor    5) HLD  -hold Atorvastatin 40mg PO daily (home regimen)  -restart on discharge home    6) Prophylactic measure   -DVT Prophylaxis: Lovenox SQ     Dispo   -Likely to be discharge in next 24 hours if medically stable for CECI

## 2019-04-25 NOTE — CONSULT NOTE ADULT - ASSESSMENT
64 y/o male presents s/p fall, found tachypneic and tachycardic, with elevated troponin and leukocytosis.

## 2019-04-25 NOTE — CONSULT NOTE ADULT - SUBJECTIVE AND OBJECTIVE BOX
History obtained by: Patient and medical record     obtained: No    Chief complaint:  "I fell"    HPI:  This is 64 y/o male with hx of HTN, HLD, poor historian who presents s/p mechanical fall. Pt states he was walking outside using a walker, tripped over uneven surface and fell, denies LOC or head trauma. In the ER pt was found tachycardic and tachypneic, with SpO2 91% on room air. Labs are significant for leukocytosis (WBC 23), RONAK (cr 1.39), CK 2158, troponin 0.17. Head CT negative for any acute pathology. EKG shows  bpm with no acute ST changes. Pt denies chest pain, palpitations, SOB or syncope. Pt's cardiologist is Dr. Riley but he hasn't followed up in 2 years.       REVIEW OF SYMPTOMS:   Cardiovascular:  as per HPI  Respiratory:  denies dyspnea,   cough,     Genitourinary:  No dysuria, no hematuria;   Gastrointestinal:   No dark color stool, no melena, no diarrhea, no constipation, no abdominal pain;   Neurological: No headache, no dizziness, no slurred speech;    Psychiatric: No agitation, c/o anxiety.  ALL OTHER REVIEW OF SYSTEMS ARE NEGATIVE.    MEDICATIONS  (STANDING):  piperacillin/tazobactam IVPB. 3.375 Gram(s) IV Intermittent once    MEDICATIONS  (PRN):        PAST MEDICAL & SURGICAL HISTORY:  HLD (hyperlipidemia)  HTN (hypertension)  No significant past surgical history      FAMILY HISTORY:  No pertinent family history in first degree relatives      SOCIAL HISTORY: lives in a senior community, has aide 6 hours/day    CIGARETTES:  never smoked     ALCOHOL: 1 can of beer twice a week    DRUGS: denies      Vital Signs Last 24 Hrs  T(C): 37.7 (2019 00:00), Max: 37.7 (2019 00:00)  T(F): 99.8 (2019 00:00), Max: 99.8 (2019 00:00)  HR: 117 (2019 22:03) (117 - 117)  BP: 103/61 (2019 22:03) (103/61 - 103/61)  BP(mean): --  RR: 18 (2019 22:03) (18 - 18)  SpO2: 91% (2019 22:03) (91% - 91%)    PHYSICAL EXAM:  General: WN/WD, mild resp distress  Neurology: A&Ox2, nonfocal, FARNSWORTH x 4, twitches, tremor  Eyes: PERRL/ EOMI, Gross vision intact  ENT/Neck: Neck supple, trachea midline, No JVD, Gross hearing intact  Respiratory: CTA B/L, No wheezing, rales, rhonchi  CV: tachycardia, RRR, S1S2, 2/6 murmur  Abdominal: Soft, NT, ND +BS,   Extremities: mild LE edema, + peripheral pulses  Skin: No rashes, skin tear on L foot        INTERPRETATION OF TELEMETRY: 's    ECG:  bpm, no acute ST changes      I&O's Detail      LABS:                        15.2   23.3  )-----------( 247      ( 2019 23:38 )             45.0     04-    142  |  104  |  30.0<H>  ----------------------------<  102  4.2   |  19.0<L>  |  1.39<H>    Ca    9.5      2019 23:38  Mg     3.1     -24    TPro  6.9  /  Alb  3.9  /  TBili  0.6  /  DBili  x   /  AST  28  /  ALT  25  /  AlkPhos  91  04-24    CARDIAC MARKERS ( 2019 23:38 )  x     / 0.17 ng/mL / 2158 U/L / x     / 15.5 ng/mL      PT/INR - ( 2019 23:38 )   PT: 11.9 sec;   INR: 1.03 ratio         PTT - ( 2019 23:38 )  PTT:25.3 sec  Urinalysis Basic - ( 2019 23:38 )    Color: Yellow / Appearance: Clear / S.015 / pH: x  Gluc: x / Ketone: Trace  / Bili: Negative / Urobili: Negative mg/dL   Blood: x / Protein: 100 mg/dL / Nitrite: Negative   Leuk Esterase: Trace / RBC: 3-5 /HPF / WBC 0-2   Sq Epi: x / Non Sq Epi: Negative / Bacteria: Occasional      I&O's Summary      RADIOLOGY & ADDITIONAL STUDIES:  X-ray:    PREVIOUS DIAGNOSTIC TESTING:      ECHO: n/a     STRESS: n/a    CATHETERIZATION: n/a

## 2019-04-25 NOTE — H&P ADULT - NSHPSOCIALHISTORY_GEN_ALL_CORE
Lives alone in a senior complex, has a home aid, who assists him with meal preparation and meds administration   Denies smoking or illicit drugs, drinks 2 beer per week.

## 2019-04-25 NOTE — H&P ADULT - HISTORY OF PRESENT ILLNESS
64 y/o M, with PMH significant for HTN and HLD, presents to the ED BIBA, s/p fall, around 7pm. Patient is a poor historian, states was walking through the parking lot of his apartment complex, when he stepped in a uneven surface, lost his balance and fell, denies any head trauma or LOC. Patient reports being unable to get on is own and was on the ground for approximately 2 hours, before a neighbor call an EMS. Patient admits having drinking alcohol (1 beer) yesterday prior to the fall. 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  Denies fever, chills, chest pain, cough, SOB, abd pain, N/V/D, back pain, neck pain, numbness, tingling, LOC, or HA.

## 2019-04-25 NOTE — H&P ADULT - NSHPLABSRESULTS_GEN_ALL_CORE
15.2   23.3  )-----------( 247      ( 24 Apr 2019 23:38 )             45.0   04-24    142  |  104  |  30.0<H>  ----------------------------<  102  4.2   |  19.0<L>  |  1.39<H>    Ca    9.5      24 Apr 2019 23:38  Mg     3.1     04-24    TPro  6.9  /  Alb  3.9  /  TBili  0.6  /  DBili  x   /  AST  28  /  ALT  25  /  AlkPhos  91  04-24    CARDIAC MARKERS ( 25 Apr 2019 02:44 )  x     / 0.17 ng/mL / x     / x     / x      CARDIAC MARKERS ( 24 Apr 2019 23:38 )  x     / 0.17 ng/mL / 2158 U/L / x     / 15.5 ng/mL

## 2019-04-26 LAB
ANION GAP SERPL CALC-SCNC: 12 MMOL/L — SIGNIFICANT CHANGE UP (ref 5–17)
BUN SERPL-MCNC: 17 MG/DL — SIGNIFICANT CHANGE UP (ref 8–20)
CALCIUM SERPL-MCNC: 7.7 MG/DL — LOW (ref 8.6–10.2)
CHLORIDE SERPL-SCNC: 113 MMOL/L — HIGH (ref 98–107)
CK MB CFR SERPL CALC: 22.2 NG/ML — HIGH (ref 0–6.7)
CK SERPL-CCNC: CRITICAL HIGH U/L (ref 30–200)
CO2 SERPL-SCNC: 18 MMOL/L — LOW (ref 22–29)
CREAT SERPL-MCNC: 0.59 MG/DL — SIGNIFICANT CHANGE UP (ref 0.5–1.3)
GLUCOSE SERPL-MCNC: 88 MG/DL — SIGNIFICANT CHANGE UP (ref 70–115)
HCT VFR BLD CALC: 36.9 % — LOW (ref 42–52)
HCV AB S/CO SERPL IA: 0.05 S/CO — SIGNIFICANT CHANGE UP (ref 0–0.99)
HCV AB SERPL-IMP: SIGNIFICANT CHANGE UP
HGB BLD-MCNC: 12.3 G/DL — LOW (ref 14–18)
MAGNESIUM SERPL-MCNC: 2.1 MG/DL — SIGNIFICANT CHANGE UP (ref 1.6–2.6)
MCHC RBC-ENTMCNC: 30.1 PG — SIGNIFICANT CHANGE UP (ref 27–31)
MCHC RBC-ENTMCNC: 33.3 G/DL — SIGNIFICANT CHANGE UP (ref 32–36)
MCV RBC AUTO: 90.4 FL — SIGNIFICANT CHANGE UP (ref 80–94)
PLATELET # BLD AUTO: 188 K/UL — SIGNIFICANT CHANGE UP (ref 150–400)
POTASSIUM SERPL-MCNC: 3.5 MMOL/L — SIGNIFICANT CHANGE UP (ref 3.5–5.3)
POTASSIUM SERPL-SCNC: 3.5 MMOL/L — SIGNIFICANT CHANGE UP (ref 3.5–5.3)
RBC # BLD: 4.08 M/UL — LOW (ref 4.6–6.2)
RBC # FLD: 14.4 % — SIGNIFICANT CHANGE UP (ref 11–15.6)
SODIUM SERPL-SCNC: 143 MMOL/L — SIGNIFICANT CHANGE UP (ref 135–145)
WBC # BLD: 10.5 K/UL — SIGNIFICANT CHANGE UP (ref 4.8–10.8)
WBC # FLD AUTO: 10.5 K/UL — SIGNIFICANT CHANGE UP (ref 4.8–10.8)

## 2019-04-26 PROCEDURE — 99232 SBSQ HOSP IP/OBS MODERATE 35: CPT

## 2019-04-26 RX ORDER — SODIUM CHLORIDE 9 MG/ML
1000 INJECTION INTRAMUSCULAR; INTRAVENOUS; SUBCUTANEOUS
Qty: 0 | Refills: 0 | Status: DISCONTINUED | OUTPATIENT
Start: 2019-04-26 | End: 2019-04-26

## 2019-04-26 RX ORDER — SODIUM CHLORIDE 9 MG/ML
1000 INJECTION INTRAMUSCULAR; INTRAVENOUS; SUBCUTANEOUS
Qty: 0 | Refills: 0 | Status: DISCONTINUED | OUTPATIENT
Start: 2019-04-26 | End: 2019-04-27

## 2019-04-26 RX ADMIN — SODIUM CHLORIDE 125 MILLILITER(S): 9 INJECTION INTRAMUSCULAR; INTRAVENOUS; SUBCUTANEOUS at 11:15

## 2019-04-26 RX ADMIN — ENOXAPARIN SODIUM 40 MILLIGRAM(S): 100 INJECTION SUBCUTANEOUS at 11:15

## 2019-04-26 RX ADMIN — SODIUM CHLORIDE 150 MILLILITER(S): 9 INJECTION INTRAMUSCULAR; INTRAVENOUS; SUBCUTANEOUS at 22:31

## 2019-04-26 RX ADMIN — AMLODIPINE BESYLATE 10 MILLIGRAM(S): 2.5 TABLET ORAL at 06:03

## 2019-04-26 NOTE — PROGRESS NOTE ADULT - SUBJECTIVE AND OBJECTIVE BOX
CARDIOLOGY PROGRESS NOTE   ([facility])                                                                                                        Subjective:  denied cp, dyspnea, comfortable laying in bed, nad    Vitals:  T(C): 37 (19 @ 04:30), Max: 37.1 (19 @ 09:15)  HR: 89 (19 @ 04:30) (80 - 98)  BP: 157/65 (19 @ 04:30) (101/50 - 169/78)  RR: 18 (19 @ 20:59) (18 - 18)  SpO2: 92% (19 @ 04:30) (92% - 97%)  Wt(kg): --  I&O's Summary    2019 07:01  -  2019 07:00  --------------------------------------------------------  IN: 0 mL / OUT: 700 mL / NET: -700 mL          PHYSICAL EXAM:  Appearance: Normal	  HEENT:   Atraumatic  Cardiovascular: Normal S1 S2, No JVD, No murmurs, No edema  Respiratory: Lungs clear to auscultation	  Gastrointestinal:  Soft, Non-tender, + BS	  Skin: No rashes, No ecchymoses, No cyanosis  Neurologic: Alert and awake, able to move extremities  Extremities: No edema    TELEMETRY: 	  SR      CURRENT MEDICATIONS:  amLODIPine   Tablet 10 milliGRAM(s) Oral daily        acetaminophen  Suppository .. 650 milliGRAM(s) Rectal every 6 hours PRN        enoxaparin Injectable 40 milliGRAM(s) SubCutaneous daily  sodium chloride 0.9%. 1000 milliLiter(s) IV Continuous <Continuous>  sodium chloride 0.9%. 1000 milliLiter(s) IV Continuous <Continuous>      LABS:	 	                            12.3   10.5  )-----------( 188      ( 2019 06:59 )             36.9         143  |  113<H>  |  17.0  ----------------------------<  88  3.5   |  18.0<L>  |  0.59    Ca    7.7<L>      2019 06:59  Phos  3.3     -  Mg     2.1         TPro  6.9  /  Alb  3.9  /  TBili  0.6  /  DBili  x   /  AST  28  /  ALT  25  /  AlkPhos  91      proBNP: Serum Pro-Brain Natriuretic Peptide: 393 pg/mL ( @ 03:15)          EXAM:  ECHO TRANSTHORACIC COMP W DOPP      PROCEDURE DATE:  2019   .      INTERPRETATION:  REPORT:    TRANSTHORACIC ECHOCARDIOGRAM REPORT         Patient Name:   JUAN C BARR Patient Location: [facility]  Medical Rec #:  [id number]   Accession #:      [id number]  Account #:                   Height:           65.7 in 167.0 cm  YOB: 1955   Weight:           194.0 lb 88.00 kg  Patient Age:    63 years     BSA:              1.97 m²  Patient Gender: M            BP:               144/73 mmHg       Date of Exam:        2019 3:20:13 PM  Sonographer:         Coleman Riddle  Referring Physician: Dominique Walters    Procedure:     2D Echo/Doppler/Color Doppler Complete.  Indications:   Abnormal electrocardiogram [ECG] [EKG] - R94.31  Diagnosis:     Abnormal electrocardiogram [ECG] [EKG] - R94.31  Study Details: Technically difficult study. Study quality was adversely   affected                 due to patient obesity, body habitus, lung interference and                 patient not able to turn on to left lateral decubitus.         2D AND M-MODE MEASUREMENTS (normal ranges within parentheses):  Aorta/Left Atrium:          Normal  Aortic Root (2D):  3.77 cm (2.4-3.7)  Left Atrium (2D):  3.95 cm (1.9-4.0)    LV DIASTOLIC FUNCTION:  MV Peak E: 0.85 m/s E/e' Ratio: 10.90  MV Peak A: 1.29 m/s Decel Time: 239 msec  E/A Ratio: 0.66    SPECTRAL DOPPLER ANALYSIS (where applicable):  Mitral Valve:  MV P1/2 Time: 69.31 msec  MV Area, PHT: 3.17 cm²    Aortic Valve: AoV Max Moo: 1.58 m/s AoV Peak PG: 10.0 mmHg AoV Mean PG:    LVOT Vmax: 1.17 m/s LVOT VTI:  LVOT Diameter:    Pulmonic Valve:  PV Max Velocity: 0.78 m/s PV Max P.5 mmHg PV Mean PG:       PHYSICIAN INTERPRETATION:  Left Ventricle: The left ventricular internal cavity size is normal.  Global LV systolic function was hyperdynamic. Left ventricular ejection   fraction, by visual estimation, is >75%. Spectral Doppler shows impaired   relaxation pattern of left ventricular myocardial filling (Grade I   diastolic dysfunction).  Right Ventricle: The right ventricular size is normal. RV systolic   function is normal.  Left Atrium: Normal left atrial size.  Right Atrium: The right atrium was not well visualized.  Pericardium: There is no evidence of pericardial effusion.  Mitral Valve: Thickening and calcification of the anterior and posterior   mitral valve leaflets. There is mild mitral annular calcification. Trace   mitral valve regurgitation is seen.  Tricuspid Valve: The tricuspid valve is not well seen.  Aortic Valve: The aortic valve is trileaflet. Mild to moderate aortic   valve regurgitation is seen.  Pulmonic Valve: The pulmonic valve was not well visualized.  Aorta: Aortic root measured at Sinus of Valsalva is normal.  Pulmonary Artery: The pulmonary artery is not well seen.  Venous: The pulmonary veins were not well visualized. The inferior vena   cava is not well visualized.       Summary:   1. Technically difficult study.   2. Hyperdynamic global left ventricular systolic function.   3. Left ventricular ejection fraction, by visual estimation, is >75%.   4. Spectral Doppler shows impaired relaxation pattern of left   ventricular myocardial filling (Grade I diastolic dysfunction).   5. Mild mitral annular calcification.   6. Thickening and calcification of the anterior and posterior mitral   valve leaflets.   7. Mild to moderate aortic regurgitation.    MD Deep Electronically signed on 2019 at 7:58:28 PM              *** Final ***                  HUGH GOSS   This document has been electronically signed. 2019  3:20PM

## 2019-04-26 NOTE — PROGRESS NOTE ADULT - SUBJECTIVE AND OBJECTIVE BOX
JUAN C BARR    [id number]    63y      Male    CC: S/P fall and unsteady gait    INTERVAL HPI/OVERNIGHT EVENTS:  64 y/o M, with PMH significant for HTN and HLD, presents to the ED BIBA, s/p fall, around 7pm. Patient is a poor historian, states was walking through the parking lot of his apartment complex, when he stepped in a uneven surface, lost his balance and fell, denies any head trauma or LOC. Patient reports being unable to get on is own and was on the ground for approximately 2 hours, before a neighbor call an EMS. Patient admits having drinking alcohol (1 beer) yesterday prior to the fall. 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  Denies fever, chills, chest pain, cough, SOB, abd pain, N/V/D, back pain, neck pain, numbness, tingling, LOC, or HA.    today pt denies any sob, no chest pain no n/v     REVIEW OF SYSTEMS:    CONSTITUTIONAL: No fever, + fatigue  RESPIRATORY: No cough, wheezing, hemoptysis; No shortness of breath  CARDIOVASCULAR: No chest pain, palpitations  GASTROINTESTINAL: No abdominal or epigastric pain. No nausea, vomiting      Vital Signs Last 24 Hrs  T(C): 36.7 (2019 04:38), Max: 37 (2019 04:30)  T(F): 98 (2019 04:38), Max: 98.6 (2019 04:30)  HR: 97 (2019 04:38) (80 - 97)  BP: 146/76 (2019 04:38) (129/76 - 169/78)  BP(mean): --  RR: 18 (2019 04:38) (18 - 18)  SpO2: 91% (2019 04:38) (91% - 93%)    PHYSICAL EXAM:    GENERAL: NAD, well-groomed  HEENT: PERRL, +EOMI  CHEST/LUNG: Clear to auscultation bilaterally; No wheezing  HEART: S1S2+, Regular rate and rhythm; No murmurs  ABDOMEN: Soft, Nontender, Nondistended; Bowel sounds present  EXTREMITIES:  2+ Peripheral Pulses, No clubbing, cyanosis, or edema + b/l lower ext knee bruises and skin scarping      LABS:                        12.3   10.5  )-----------( 188      ( 2019 06:59 )             36.9         143  |  113<H>  |  17.0  ----------------------------<  88  3.5   |  18.0<L>  |  0.59    Ca    7.7<L>      2019 06:59  Phos  3.3       Mg     2.1         TPro  6.9  /  Alb  3.9  /  TBili  0.6  /  DBili  x   /  AST  28  /  ALT  25  /  AlkPhos  91      PT/INR - ( 2019 23:38 )   PT: 11.9 sec;   INR: 1.03 ratio         PTT - ( 2019 23:38 )  PTT:25.3 sec  Urinalysis Basic - ( 2019 23:38 )    Color: Yellow / Appearance: Clear / S.015 / pH: x  Gluc: x / Ketone: Trace  / Bili: Negative / Urobili: Negative mg/dL   Blood: x / Protein: 100 mg/dL / Nitrite: Negative   Leuk Esterase: Trace / RBC: 3-5 /HPF / WBC 0-2   Sq Epi: x / Non Sq Epi: Negative / Bacteria: Occasional          MEDICATIONS  (STANDING):  amLODIPine   Tablet 10 milliGRAM(s) Oral daily  enoxaparin Injectable 40 milliGRAM(s) SubCutaneous daily  sodium chloride 0.9%. 1000 milliLiter(s) (150 mL/Hr) IV Continuous <Continuous>    MEDICATIONS  (PRN):  acetaminophen  Suppository .. 650 milliGRAM(s) Rectal every 6 hours PRN Temp greater or equal to 38C (100.4F), Moderate Pain (4 - 6)      RADIOLOGY & ADDITIONAL TESTS:  echo:  PHYSICIAN INTERPRETATION:  Left Ventricle: The left ventricular internal cavity size is normal.  Global LV systolic function was hyperdynamic. Left ventricular ejection   fraction, by visual estimation, is >75%. Spectral Doppler shows impaired   relaxation pattern of left ventricular myocardial filling (Grade I   diastolic dysfunction).  Right Ventricle: The right ventricular size is normal. RV systolic   function is normal.  Left Atrium: Normal left atrial size.  Right Atrium: The right atrium was not well visualized.  Pericardium: There is no evidence of pericardial effusion.  Mitral Valve: Thickening and calcification of the anterior and posterior   mitral valve leaflets. There is mild mitral annular calcification. Trace   mitral valve regurgitation is seen.  Tricuspid Valve: The tricuspid valve is not well seen.  Aortic Valve: The aortic valve is trileaflet. Mild to moderate aortic   valve regurgitation is seen.  Pulmonic Valve: The pulmonic valve was not well visualized.  Aorta: Aortic root measured at Sinus of Valsalva is normal.  Pulmonary Artery: The pulmonary artery is not well seen.  Venous: The pulmonary veins were not well visualized. The inferior vena   cava is not well visualized.       Summary:   1. Technically difficult study.   2. Hyperdynamic global left ventricular systolic function.   3. Left ventricular ejection fraction, by visual estimation, is >75%.   4. Spectral Doppler shows impaired relaxation pattern of left   ventricular myocardial filling (Grade I diastolic dysfunction).   5. Mild mitral annular calcification.   6. Thickening and calcification of the anterior and posterior mitral   valve leaflets.   7. Mild to moderate aortic regurgitation.    MD Deep Electronically signed on 2019 at 7:58:28 PM

## 2019-04-26 NOTE — PROGRESS NOTE ADULT - ASSESSMENT
63M hx HTN, HLD s/p mechanical fall  In the ER pt was found tachycardic and tachypneic, with SpO2 91% on room air. Labs are significant for leukocytosis (WBC 23), RONAK (cr 1.39), CK 2158, troponin 0.17. Head CT negative for any acute pathology. EKG shows  bpm with no acute ST changes. Pt denies chest pain, palpitations, SOB or syncope. CTA negative for PE.       Elevated troponin: No sign variation  Likely due to RONAK, rhabdomyolysis   Echo:  LVEF >75. Ml-mod AR.    Acute Rhabdomyolysis secondary to mechanical fall  Leucocytosis in setting of Rhabdo, no source of infection.    RONAK secondary to Rhabdo and Dehydration  Cont IVF, FU Scr    HTN  Amlodipine, Monitor BP    HLD  hold statin for now.     No further cardiac TELLO at this point.  Will FU as outpt.  Will s/o  Pls  Call with any questions

## 2019-04-26 NOTE — PROGRESS NOTE ADULT - ASSESSMENT
64 y/o M, with PMH significant for HTN and HLD, presents to the ED BIBA, s/p fall, around 7pm. Patient is a poor historian, states was walking through the parking lot of his apartment complex, when he stepped in a uneven surface, lost his balance and fell, denies any head trauma or LOC. Patient reports being unable to get on is own and was on the ground for approximately 2 hours, before a neighbor call an EMS. Patient admits having drinking alcohol (1 beer) yesterday prior to the fall. 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  Denies fever, chills, chest pain, cough, SOB, abd pain, N/V/D, back pain, neck pain, numbness, tingling, LOC, or HA.    1) Acute Rhabdomyolysis secondary to mechanical fall  -Patient lying in the ground for about 2hours unable to get up on is own  -Leucocytosis likely reactive to Rhabdo, no source of infection at this time: resolved  -Elevated troponin x1 secondary to Rhabdo, EKG w/o ST changes and patient does not reports chest pain: cleared by cardio  -S/p 2000cc IV fluids  -continue fluid hydration NS @ 150cc/h  -CK level has raised a little from 11k to 13k  -repeat CK level in am    2) Mechanical fall  -Patient reports several episodes in the past, last fall a month ago  -Unclear why patient has gait instability   -Uses a walker in a regular basis  -Fall precautions  -PT eval rec [facility]    3) RONAK: resolved  -Likely secondary to Rhabdo and Dehydration  -Will continue with IV hydration   -monitor Cr level, seems to be have resolved    4) HTN  -C/w Amlodipine and monitor    5) HLD  -hold Atorvastatin 40mg PO daily (home regimen)  -restart on discharge home    6) Prophylactic measure   -DVT Prophylaxis: Lovenox SQ     Dispo   -Likely to be discharge in next 48 hours if start seeing a decline in CK level

## 2019-04-27 LAB
ANION GAP SERPL CALC-SCNC: 9 MMOL/L — SIGNIFICANT CHANGE UP (ref 5–17)
BUN SERPL-MCNC: 10 MG/DL — SIGNIFICANT CHANGE UP (ref 8–20)
CALCIUM SERPL-MCNC: 8.1 MG/DL — LOW (ref 8.6–10.2)
CHLORIDE SERPL-SCNC: 109 MMOL/L — HIGH (ref 98–107)
CK MB CFR SERPL CALC: 11.5 NG/ML — HIGH (ref 0–6.7)
CK SERPL-CCNC: CRITICAL HIGH U/L (ref 30–200)
CO2 SERPL-SCNC: 21 MMOL/L — LOW (ref 22–29)
CREAT SERPL-MCNC: 0.52 MG/DL — SIGNIFICANT CHANGE UP (ref 0.5–1.3)
GLUCOSE SERPL-MCNC: 89 MG/DL — SIGNIFICANT CHANGE UP (ref 70–115)
MAGNESIUM SERPL-MCNC: 1.9 MG/DL — SIGNIFICANT CHANGE UP (ref 1.6–2.6)
POTASSIUM SERPL-MCNC: 3.8 MMOL/L — SIGNIFICANT CHANGE UP (ref 3.5–5.3)
POTASSIUM SERPL-SCNC: 3.8 MMOL/L — SIGNIFICANT CHANGE UP (ref 3.5–5.3)
SODIUM SERPL-SCNC: 139 MMOL/L — SIGNIFICANT CHANGE UP (ref 135–145)

## 2019-04-27 PROCEDURE — 99232 SBSQ HOSP IP/OBS MODERATE 35: CPT

## 2019-04-27 RX ORDER — SODIUM CHLORIDE 9 MG/ML
1000 INJECTION INTRAMUSCULAR; INTRAVENOUS; SUBCUTANEOUS
Qty: 0 | Refills: 0 | Status: DISCONTINUED | OUTPATIENT
Start: 2019-04-27 | End: 2019-04-30

## 2019-04-27 RX ADMIN — ENOXAPARIN SODIUM 40 MILLIGRAM(S): 100 INJECTION SUBCUTANEOUS at 12:04

## 2019-04-27 RX ADMIN — AMLODIPINE BESYLATE 10 MILLIGRAM(S): 2.5 TABLET ORAL at 05:29

## 2019-04-27 RX ADMIN — SODIUM CHLORIDE 175 MILLILITER(S): 9 INJECTION INTRAMUSCULAR; INTRAVENOUS; SUBCUTANEOUS at 12:05

## 2019-04-28 LAB
ANION GAP SERPL CALC-SCNC: 10 MMOL/L — SIGNIFICANT CHANGE UP (ref 5–17)
BUN SERPL-MCNC: 8 MG/DL — SIGNIFICANT CHANGE UP (ref 8–20)
CALCIUM SERPL-MCNC: 8.4 MG/DL — LOW (ref 8.6–10.2)
CHLORIDE SERPL-SCNC: 107 MMOL/L — SIGNIFICANT CHANGE UP (ref 98–107)
CK MB CFR SERPL CALC: 10 NG/ML — HIGH (ref 0–6.7)
CK SERPL-CCNC: CRITICAL HIGH U/L (ref 30–200)
CO2 SERPL-SCNC: 21 MMOL/L — LOW (ref 22–29)
CREAT SERPL-MCNC: 0.49 MG/DL — LOW (ref 0.5–1.3)
GLUCOSE SERPL-MCNC: 91 MG/DL — SIGNIFICANT CHANGE UP (ref 70–115)
POTASSIUM SERPL-MCNC: 3.9 MMOL/L — SIGNIFICANT CHANGE UP (ref 3.5–5.3)
POTASSIUM SERPL-SCNC: 3.9 MMOL/L — SIGNIFICANT CHANGE UP (ref 3.5–5.3)
SODIUM SERPL-SCNC: 138 MMOL/L — SIGNIFICANT CHANGE UP (ref 135–145)

## 2019-04-28 PROCEDURE — 99232 SBSQ HOSP IP/OBS MODERATE 35: CPT

## 2019-04-28 RX ORDER — ALPRAZOLAM 0.25 MG
0.25 TABLET ORAL
Qty: 0 | Refills: 0 | Status: DISCONTINUED | OUTPATIENT
Start: 2019-04-28 | End: 2019-05-01

## 2019-04-28 RX ADMIN — SODIUM CHLORIDE 175 MILLILITER(S): 9 INJECTION INTRAMUSCULAR; INTRAVENOUS; SUBCUTANEOUS at 01:00

## 2019-04-28 RX ADMIN — ENOXAPARIN SODIUM 40 MILLIGRAM(S): 100 INJECTION SUBCUTANEOUS at 11:15

## 2019-04-28 RX ADMIN — AMLODIPINE BESYLATE 10 MILLIGRAM(S): 2.5 TABLET ORAL at 05:54

## 2019-04-28 RX ADMIN — SODIUM CHLORIDE 175 MILLILITER(S): 9 INJECTION INTRAMUSCULAR; INTRAVENOUS; SUBCUTANEOUS at 11:15

## 2019-04-28 RX ADMIN — SODIUM CHLORIDE 175 MILLILITER(S): 9 INJECTION INTRAMUSCULAR; INTRAVENOUS; SUBCUTANEOUS at 22:32

## 2019-04-28 NOTE — PROGRESS NOTE ADULT - ASSESSMENT
64 y/o M, with PMH significant for HTN and HLD, presents to the ED BIBA, s/p fall, around 7pm. Patient is a poor historian, states was walking through the parking lot of his apartment complex, when he stepped in a uneven surface, lost his balance and fell, denies any head trauma or LOC. Patient reports being unable to get on is own and was on the ground for approximately 2 hours, before a neighbor call an EMS. Patient admits having drinking alcohol (1 beer) yesterday prior to the fall. 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  Denies fever, chills, chest pain, cough, SOB, abd pain, N/V/D, back pain, neck pain, numbness, tingling, LOC, or HA.    1) Acute Rhabdomyolysis secondary to mechanical fall  - on NS at 175 cc/hour; will continue  - CK level improving hopefully continues to trend down  - repeat CK level in am  - OOB to chair and ambulation as tolerated    2) Mechanical fall  -Patient reports several episodes in the past, last fall a month ago  -Uses a walker in a regular basis  -Fall precautions  -PT recommends [facility] when available    3) RONAK: resolved  -Likely secondary to Rhabdo and Dehydration  -Will continue with IV hydration   -monitor Cr level, seems to be have resolved    4) HTN  -C/w Amlodipine and monitor    5) HLD  -hold Atorvastatin 40mg PO daily (home regimen)  -restart on discharge home    6) Prophylactic measure   -DVT Prophylaxis: Lovenox SQ     7) Anxiety; patient notes that he feels 'excited' most of the time; he doesn't want to be medicated for this  - started PRN xanax for anxiety  - hold off on other SSRI  - no hallucinations/psychosis present  - caretaker notes that patient has 1 beer a week on occasion; counseled her on avoidance given history of TBI    8) Leucocytosis; resolved and likely reactive    Dispo   -Likely to be discharge in next 48 hours if CK level below [id number] again

## 2019-04-28 NOTE — PROGRESS NOTE ADULT - SUBJECTIVE AND OBJECTIVE BOX
JUAN C ABRR    [id number]    63y      Male    CC: S/P fall and unsteady gait    INTERVAL HPI/OVERNIGHT EVENTS:  64 y/o M, with PMH significant for HTN and HLD, presents to the ED BIBA, s/p fall, around 7pm. Patient is a poor historian, states was walking through the parking lot of his apartment complex, when he stepped in a uneven surface, lost his balance and fell, denies any head trauma or LOC. Patient reports being unable to get on is own and was on the ground for approximately 2 hours, before a neighbor call an EMS. Patient admits having drinking alcohol (1 beer) yesterday prior to the fall. 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  Denies fever, chills, chest pain, cough, SOB, abd pain, N/V/D, back pain, neck pain, numbness, tingling, LOC, or HA.    today pt denies any sob, no chest pain no n/v     REVIEW OF SYSTEMS:    CONSTITUTIONAL: No fever, + fatigue  RESPIRATORY: No cough, wheezing, hemoptysis; No shortness of breath  CARDIOVASCULAR: No chest pain, palpitations  GASTROINTESTINAL: No abdominal or epigastric pain. No nausea, vomiting      Vital Signs Last 24 Hrs  T(C): 36.7 (2019 04:38), Max: 37 (2019 04:30)  T(F): 98 (2019 04:38), Max: 98.6 (2019 04:30)  HR: 97 (2019 04:38) (80 - 97)  BP: 146/76 (2019 04:38) (129/76 - 169/78)  BP(mean): --  RR: 18 (2019 04:38) (18 - 18)  SpO2: 91% (2019 04:38) (91% - 93%)    PHYSICAL EXAM:    GENERAL: NAD, well-groomed  HEENT: PERRL, +EOMI  CHEST/LUNG: Clear to auscultation bilaterally; No wheezing  HEART: S1S2+, Regular rate and rhythm; No murmurs  ABDOMEN: Soft, Nontender, Nondistended; Bowel sounds present  EXTREMITIES:  2+ Peripheral Pulses, No clubbing, cyanosis, or edema + b/l lower ext knee bruises and skin scarping      LABS:                        12.3   10.5  )-----------( 188      ( 2019 06:59 )             36.9         143  |  113<H>  |  17.0  ----------------------------<  88  3.5   |  18.0<L>  |  0.59    Ca    7.7<L>      2019 06:59  Phos  3.3       Mg     2.1         TPro  6.9  /  Alb  3.9  /  TBili  0.6  /  DBili  x   /  AST  28  /  ALT  25  /  AlkPhos  91      PT/INR - ( 2019 23:38 )   PT: 11.9 sec;   INR: 1.03 ratio         PTT - ( 2019 23:38 )  PTT:25.3 sec  Urinalysis Basic - ( 2019 23:38 )    Color: Yellow / Appearance: Clear / S.015 / pH: x  Gluc: x / Ketone: Trace  / Bili: Negative / Urobili: Negative mg/dL   Blood: x / Protein: 100 mg/dL / Nitrite: Negative   Leuk Esterase: Trace / RBC: 3-5 /HPF / WBC 0-2   Sq Epi: x / Non Sq Epi: Negative / Bacteria: Occasional          MEDICATIONS  (STANDING):  amLODIPine   Tablet 10 milliGRAM(s) Oral daily  enoxaparin Injectable 40 milliGRAM(s) SubCutaneous daily  sodium chloride 0.9%. 1000 milliLiter(s) (150 mL/Hr) IV Continuous <Continuous>    MEDICATIONS  (PRN):  acetaminophen  Suppository .. 650 milliGRAM(s) Rectal every 6 hours PRN Temp greater or equal to 38C (100.4F), Moderate Pain (4 - 6)      RADIOLOGY & ADDITIONAL TESTS:  echo:  PHYSICIAN INTERPRETATION:  Left Ventricle: The left ventricular internal cavity size is normal.  Global LV systolic function was hyperdynamic. Left ventricular ejection   fraction, by visual estimation, is >75%. Spectral Doppler shows impaired   relaxation pattern of left ventricular myocardial filling (Grade I   diastolic dysfunction).  Right Ventricle: The right ventricular size is normal. RV systolic   function is normal.  Left Atrium: Normal left atrial size.  Right Atrium: The right atrium was not well visualized.  Pericardium: There is no evidence of pericardial effusion.  Mitral Valve: Thickening and calcification of the anterior and posterior   mitral valve leaflets. There is mild mitral annular calcification. Trace   mitral valve regurgitation is seen.  Tricuspid Valve: The tricuspid valve is not well seen.  Aortic Valve: The aortic valve is trileaflet. Mild to moderate aortic   valve regurgitation is seen.  Pulmonic Valve: The pulmonic valve was not well visualized.  Aorta: Aortic root measured at Sinus of Valsalva is normal.  Pulmonary Artery: The pulmonary artery is not well seen.  Venous: The pulmonary veins were not well visualized. The inferior vena   cava is not well visualized.       Summary:   1. Technically difficult study.   2. Hyperdynamic global left ventricular systolic function.   3. Left ventricular ejection fraction, by visual estimation, is >75%.   4. Spectral Doppler shows impaired relaxation pattern of left   ventricular myocardial filling (Grade I diastolic dysfunction).   5. Mild mitral annular calcification.   6. Thickening and calcification of the anterior and posterior mitral   valve leaflets.   7. Mild to moderate aortic regurgitation.    MD Deep Electronically signed on 2019 at 7:58:28 PM JUAN C BARR    [id number]    63y      Male    CC: S/P fall and unsteady gait    INTERVAL HPI/OVERNIGHT EVENTS:  64 y/o M, with PMH significant for HTN and HLD, presents to the ED BIBA, s/p fall, around 7pm. Patient is a poor historian, states was walking through the parking lot of his apartment complex, when he stepped in a uneven surface, lost his balance and fell, denies any head trauma or LOC. Patient reports being unable to get on is own and was on the ground for approximately 2 hours, before a neighbor call an EMS. Patient admits having drinking alcohol (1 beer) yesterday prior to the fall. 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  Denies fever, chills, chest pain, cough, SOB, abd pain, N/V/D, back pain, neck pain, numbness, tingling, LOC, or HA.    today pt denies any sob, no chest pain no n/v     REVIEW OF SYSTEMS:    CONSTITUTIONAL: No fever, + fatigue  RESPIRATORY: No cough, wheezing, hemoptysis; No shortness of breath  CARDIOVASCULAR: No chest pain, palpitations  GASTROINTESTINAL: No abdominal or epigastric pain. No nausea, vomiting      Vital Signs Last 24 Hrs  T(C): 36.7 (26 Apr 2019 04:38), Max: 37 (26 Apr 2019 04:30)  T(F): 98 (26 Apr 2019 04:38), Max: 98.6 (26 Apr 2019 04:30)  HR: 97 (26 Apr 2019 04:38) (80 - 97)  BP: 146/76 (26 Apr 2019 04:38) (129/76 - 169/78)  BP(mean): --  RR: 18 (26 Apr 2019 04:38) (18 - 18)  SpO2: 91% (26 Apr 2019 04:38) (91% - 93%)    PHYSICAL EXAM:    GENERAL: NAD, well-groomed  HEENT: PERRL, +EOMI  CHEST/LUNG: Clear to auscultation bilaterally; No wheezing  HEART: S1S2+, Regular rate and rhythm; No murmurs  ABDOMEN: Soft, Nontender, Nondistended; Bowel sounds present  EXTREMITIES:  2+ Peripheral Pulses, No clubbing, cyanosis, or edema + b/l lower ext knee bruises and skin scarping    LABS:  04-28    138  |  107  |  8.0  ----------------------------<  91  3.9   |  21.0<L>  |  0.49<L>    Ca    8.4<L>      28 Apr 2019 05:53  Mg     1.9     04-27    Creatine Kinase, Serum: [id number]             MEDICATIONS  (STANDING):  amLODIPine   Tablet 10 milliGRAM(s) Oral daily  enoxaparin Injectable 40 milliGRAM(s) SubCutaneous daily  sodium chloride 0.9%. 1000 milliLiter(s) (175 mL/Hr) IV Continuous <Continuous>    MEDICATIONS  (PRN):  acetaminophen  Suppository .. 650 milliGRAM(s) Rectal every 6 hours PRN Temp greater or equal to 38C (100.4F), Moderate Pain (4 - 6)

## 2019-04-29 ENCOUNTER — APPOINTMENT (OUTPATIENT)
Dept: CARDIOLOGY | Facility: CLINIC | Age: 64
End: 2019-04-29

## 2019-04-29 LAB
ANION GAP SERPL CALC-SCNC: 12 MMOL/L — SIGNIFICANT CHANGE UP (ref 5–17)
BUN SERPL-MCNC: 8 MG/DL — SIGNIFICANT CHANGE UP (ref 8–20)
CALCIUM SERPL-MCNC: 8.3 MG/DL — LOW (ref 8.6–10.2)
CHLORIDE SERPL-SCNC: 105 MMOL/L — SIGNIFICANT CHANGE UP (ref 98–107)
CK MB CFR SERPL CALC: 9.9 NG/ML — HIGH (ref 0–6.7)
CK SERPL-CCNC: CRITICAL HIGH U/L (ref 30–200)
CO2 SERPL-SCNC: 22 MMOL/L — SIGNIFICANT CHANGE UP (ref 22–29)
CREAT SERPL-MCNC: 0.47 MG/DL — LOW (ref 0.5–1.3)
GLUCOSE SERPL-MCNC: 86 MG/DL — SIGNIFICANT CHANGE UP (ref 70–115)
POTASSIUM SERPL-MCNC: 3.9 MMOL/L — SIGNIFICANT CHANGE UP (ref 3.5–5.3)
POTASSIUM SERPL-SCNC: 3.9 MMOL/L — SIGNIFICANT CHANGE UP (ref 3.5–5.3)
SODIUM SERPL-SCNC: 139 MMOL/L — SIGNIFICANT CHANGE UP (ref 135–145)

## 2019-04-29 PROCEDURE — 99232 SBSQ HOSP IP/OBS MODERATE 35: CPT

## 2019-04-29 RX ADMIN — SODIUM CHLORIDE 175 MILLILITER(S): 9 INJECTION INTRAMUSCULAR; INTRAVENOUS; SUBCUTANEOUS at 06:35

## 2019-04-29 RX ADMIN — AMLODIPINE BESYLATE 10 MILLIGRAM(S): 2.5 TABLET ORAL at 06:35

## 2019-04-29 RX ADMIN — SODIUM CHLORIDE 175 MILLILITER(S): 9 INJECTION INTRAMUSCULAR; INTRAVENOUS; SUBCUTANEOUS at 11:05

## 2019-04-29 RX ADMIN — SODIUM CHLORIDE 175 MILLILITER(S): 9 INJECTION INTRAMUSCULAR; INTRAVENOUS; SUBCUTANEOUS at 21:09

## 2019-04-29 RX ADMIN — ENOXAPARIN SODIUM 40 MILLIGRAM(S): 100 INJECTION SUBCUTANEOUS at 11:06

## 2019-04-29 NOTE — PROGRESS NOTE ADULT - SUBJECTIVE AND OBJECTIVE BOX
JUAN C BARR    [id number]    63y      Male    CC: S/P fall and unsteady gait    INTERVAL HPI/OVERNIGHT EVENTS:  62 y/o M, with PMH significant for HTN and HLD, presents to the ED BIBA, s/p fall, around 7pm. Patient is a poor historian, states was walking through the parking lot of his apartment complex, when he stepped in a uneven surface, lost his balance and fell, denies any head trauma or LOC. Patient reports being unable to get on is own and was on the ground for approximately 2 hours, before a neighbor call an EMS. Patient admits having drinking alcohol (1 beer) yesterday prior to the fall. 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  Denies fever, chills, chest pain, cough, SOB, abd pain, N/V/D, back pain, neck pain, numbness, tingling, LOC, or HA.    today pt denies any sob, no chest pain no n/v     REVIEW OF SYSTEMS:    CONSTITUTIONAL: No fever, + fatigue  RESPIRATORY: No cough, wheezing, hemoptysis; No shortness of breath  CARDIOVASCULAR: No chest pain, palpitations  GASTROINTESTINAL: No abdominal or epigastric pain. No nausea, vomiting      Vital Signs Last 24 Hrs  T(C): 37.2 (29 Apr 2019 10:46), Max: 37.2 (29 Apr 2019 10:46)  T(F): 98.9 (29 Apr 2019 10:46), Max: 98.9 (29 Apr 2019 10:46)  HR: 101 (29 Apr 2019 10:46) (92 - 101)  BP: 149/84 (29 Apr 2019 10:46) (148/83 - 163/71)  BP(mean): --  RR: 18 (29 Apr 2019 10:46) (18 - 18)  SpO2: 93% (29 Apr 2019 10:46) (93% - 97%)    PHYSICAL EXAM:    GENERAL: NAD, well-groomed  HEENT: PERRL, +EOMI  CHEST/LUNG: Clear to auscultation bilaterally; No wheezing  HEART: S1S2+, Regular rate and rhythm; No murmurs  ABDOMEN: Soft, Nontender, Nondistended; Bowel sounds present  EXTREMITIES:  2+ Peripheral Pulses, No clubbing, cyanosis, or edema + b/l lower ext knee bruises and skin scarping      LABS:    04-29    139  |  105  |  8.0  ----------------------------<  86  3.9   |  22.0  |  0.47<L>    Ca    8.3<L>      29 Apr 2019 07:14      MEDICATIONS  (STANDING):  amLODIPine   Tablet 10 milliGRAM(s) Oral daily  enoxaparin Injectable 40 milliGRAM(s) SubCutaneous daily  sodium chloride 0.9%. 1000 milliLiter(s) (175 mL/Hr) IV Continuous <Continuous>    MEDICATIONS  (PRN):  acetaminophen  Suppository .. 650 milliGRAM(s) Rectal every 6 hours PRN Temp greater or equal to 38C (100.4F), Moderate Pain (4 - 6)  ALPRAZolam 0.25 milliGRAM(s) Oral two times a day PRN anxiety

## 2019-04-30 LAB
ANION GAP SERPL CALC-SCNC: 14 MMOL/L — SIGNIFICANT CHANGE UP (ref 5–17)
BUN SERPL-MCNC: 8 MG/DL — SIGNIFICANT CHANGE UP (ref 8–20)
CALCIUM SERPL-MCNC: 8.5 MG/DL — LOW (ref 8.6–10.2)
CHLORIDE SERPL-SCNC: 104 MMOL/L — SIGNIFICANT CHANGE UP (ref 98–107)
CK MB CFR SERPL CALC: 7.2 NG/ML — HIGH (ref 0–6.7)
CK SERPL-CCNC: 9924 U/L — HIGH (ref 30–200)
CO2 SERPL-SCNC: 20 MMOL/L — LOW (ref 22–29)
CREAT SERPL-MCNC: 0.4 MG/DL — LOW (ref 0.5–1.3)
CULTURE RESULTS: SIGNIFICANT CHANGE UP
CULTURE RESULTS: SIGNIFICANT CHANGE UP
GLUCOSE SERPL-MCNC: 91 MG/DL — SIGNIFICANT CHANGE UP (ref 70–115)
HCT VFR BLD CALC: 42.7 % — SIGNIFICANT CHANGE UP (ref 42–52)
HGB BLD-MCNC: 14.5 G/DL — SIGNIFICANT CHANGE UP (ref 14–18)
MAGNESIUM SERPL-MCNC: 1.7 MG/DL — SIGNIFICANT CHANGE UP (ref 1.6–2.6)
MCHC RBC-ENTMCNC: 29.7 PG — SIGNIFICANT CHANGE UP (ref 27–31)
MCHC RBC-ENTMCNC: 34 G/DL — SIGNIFICANT CHANGE UP (ref 32–36)
MCV RBC AUTO: 87.5 FL — SIGNIFICANT CHANGE UP (ref 80–94)
PLATELET # BLD AUTO: 254 K/UL — SIGNIFICANT CHANGE UP (ref 150–400)
POTASSIUM SERPL-MCNC: 4 MMOL/L — SIGNIFICANT CHANGE UP (ref 3.5–5.3)
POTASSIUM SERPL-SCNC: 4 MMOL/L — SIGNIFICANT CHANGE UP (ref 3.5–5.3)
RBC # BLD: 4.88 M/UL — SIGNIFICANT CHANGE UP (ref 4.6–6.2)
RBC # FLD: 14.5 % — SIGNIFICANT CHANGE UP (ref 11–15.6)
SODIUM SERPL-SCNC: 138 MMOL/L — SIGNIFICANT CHANGE UP (ref 135–145)
SPECIMEN SOURCE: SIGNIFICANT CHANGE UP
SPECIMEN SOURCE: SIGNIFICANT CHANGE UP
WBC # BLD: 10.2 K/UL — SIGNIFICANT CHANGE UP (ref 4.8–10.8)
WBC # FLD AUTO: 10.2 K/UL — SIGNIFICANT CHANGE UP (ref 4.8–10.8)

## 2019-04-30 PROCEDURE — 99232 SBSQ HOSP IP/OBS MODERATE 35: CPT

## 2019-04-30 RX ORDER — SODIUM CHLORIDE 9 MG/ML
1000 INJECTION INTRAMUSCULAR; INTRAVENOUS; SUBCUTANEOUS
Qty: 0 | Refills: 0 | Status: DISCONTINUED | OUTPATIENT
Start: 2019-04-30 | End: 2019-05-01

## 2019-04-30 RX ADMIN — SODIUM CHLORIDE 125 MILLILITER(S): 9 INJECTION INTRAMUSCULAR; INTRAVENOUS; SUBCUTANEOUS at 11:05

## 2019-04-30 RX ADMIN — SODIUM CHLORIDE 175 MILLILITER(S): 9 INJECTION INTRAMUSCULAR; INTRAVENOUS; SUBCUTANEOUS at 05:50

## 2019-04-30 RX ADMIN — ENOXAPARIN SODIUM 40 MILLIGRAM(S): 100 INJECTION SUBCUTANEOUS at 16:24

## 2019-04-30 RX ADMIN — AMLODIPINE BESYLATE 10 MILLIGRAM(S): 2.5 TABLET ORAL at 05:50

## 2019-04-30 NOTE — PROGRESS NOTE ADULT - ASSESSMENT
64 y/o M, with PMH significant for HTN and HLD, presents to the ED BIBA, s/p fall, around 7pm. Patient is a poor historian, states was walking through the parking lot of his apartment complex, when he stepped in a uneven surface, lost his balance and fell, denies any head trauma or LOC. Patient reports being unable to get on is own and was on the ground for approximately 2 hours, before a neighbor call an EMS. Patient admits having drinking alcohol (1 beer) yesterday prior to the fall. 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  Denies fever, chills, chest pain, cough, SOB, abd pain, N/V/D, back pain, neck pain, numbness, tingling, LOC, or HA.    1) Acute Rhabdomyolysis secondary to mechanical fall  - on NS at 125 cc/hour; will continue  - CK level improving, continue to monitor  - repeat CK level in am  - OOB to chair and ambulation as tolerated    2) Mechanical fall  -Patient reports several episodes in the past, last fall a month ago  -Uses a walker in a regular basis  -Fall precautions  -PT recommends [facility] when available    3) RONAK: resolved  -Likely secondary to Rhabdo and Dehydration  -Will continue with IV hydration   -monitor Cr level, seems to be have resolved    4) HTN  -C/w Amlodipine and monitor    5) HLD  -hold Atorvastatin 40mg PO daily (home regimen)  -restart on discharged    6) Prophylactic measure   -DVT Prophylaxis: Lovenox SQ     7) Anxiety; patient notes that he feels 'excited' most of the time; he doesn't want to be medicated for this  - PRN xanax for anxiety  - hold off on other SSRI  - no hallucinations/psychosis present  - caretaker notes that patient has 1 beer a week on occasion; counseled her on avoidance given history of TBI    8) Leucocytosis; resolved and likely reactive    Dispo   -Likely to be discharge in next 24-48 hours if CK level continues to decline

## 2019-04-30 NOTE — PROGRESS NOTE ADULT - SUBJECTIVE AND OBJECTIVE BOX
JUAN C BARR    [id number]    63y      Male    CC: S/P fall and unsteady gait    INTERVAL HPI/OVERNIGHT EVENTS:  64 y/o M, with PMH significant for HTN and HLD, presents to the ED BIBA, s/p fall, around 7pm. Patient is a poor historian, states was walking through the parking lot of his apartment complex, when he stepped in a uneven surface, lost his balance and fell, denies any head trauma or LOC. Patient reports being unable to get on is own and was on the ground for approximately 2 hours, before a neighbor call an EMS. Patient admits having drinking alcohol (1 beer) yesterday prior to the fall. 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  Denies fever, chills, chest pain, cough, SOB, abd pain, N/V/D, back pain, neck pain, numbness, tingling, LOC, or HA.    today pt denies any sob, no chest pain no n/v     REVIEW OF SYSTEMS:    CONSTITUTIONAL: No fever, + fatigue  RESPIRATORY: No cough, wheezing, hemoptysis; No shortness of breath  CARDIOVASCULAR: No chest pain, palpitations  GASTROINTESTINAL: No abdominal or epigastric pain. No nausea, vomiting      Vital Signs Last 24 Hrs  T(C): 36.2 (30 Apr 2019 10:50), Max: 36.9 (30 Apr 2019 05:24)  T(F): 97.2 (30 Apr 2019 10:50), Max: 98.5 (30 Apr 2019 05:24)  HR: 120 (30 Apr 2019 10:50) (96 - 120)  BP: 120/67 (30 Apr 2019 10:50) (120/67 - 160/80)  BP(mean): --  RR: 18 (30 Apr 2019 10:50) (18 - 18)  SpO2: 95% (30 Apr 2019 10:50) (95% - 95%)    PHYSICAL EXAM:  GENERAL: NAD, well-groomed  HEENT: PERRL, +EOMI  CHEST/LUNG: Clear to auscultation bilaterally; No wheezing  HEART: S1S2+, Regular rate and rhythm; No murmurs  ABDOMEN: Soft, Nontender, Nondistended; Bowel sounds present  EXTREMITIES:  2+ Peripheral Pulses, No clubbing, cyanosis, or edema + b/l lower ext knee bruises and skin scarping      LABS:                        14.5   10.2  )-----------( 254      ( 30 Apr 2019 07:54 )             42.7     04-30    138  |  104  |  8.0  ----------------------------<  91  4.0   |  20.0<L>  |  0.40<L>    Ca    8.5<L>      30 Apr 2019 07:54  Mg     1.7     04-30        MEDICATIONS  (STANDING):  amLODIPine   Tablet 10 milliGRAM(s) Oral daily  enoxaparin Injectable 40 milliGRAM(s) SubCutaneous daily  sodium chloride 0.9%. 1000 milliLiter(s) (125 mL/Hr) IV Continuous <Continuous>    MEDICATIONS  (PRN):  acetaminophen  Suppository .. 650 milliGRAM(s) Rectal every 6 hours PRN Temp greater or equal to 38C (100.4F), Moderate Pain (4 - 6)  ALPRAZolam 0.25 milliGRAM(s) Oral two times a day PRN anxiety

## 2019-05-01 ENCOUNTER — TRANSCRIPTION ENCOUNTER (OUTPATIENT)
Age: 64
End: 2019-05-01

## 2019-05-01 VITALS
RESPIRATION RATE: 18 BRPM | DIASTOLIC BLOOD PRESSURE: 83 MMHG | SYSTOLIC BLOOD PRESSURE: 145 MMHG | OXYGEN SATURATION: 95 % | HEART RATE: 104 BPM | TEMPERATURE: 98 F

## 2019-05-01 LAB
CK MB CFR SERPL CALC: 4.6 NG/ML — SIGNIFICANT CHANGE UP (ref 0–6.7)
CK SERPL-CCNC: 5050 U/L — HIGH (ref 30–200)

## 2019-05-01 PROCEDURE — 83880 ASSAY OF NATRIURETIC PEPTIDE: CPT

## 2019-05-01 PROCEDURE — 99239 HOSP IP/OBS DSCHRG MGMT >30: CPT

## 2019-05-01 PROCEDURE — 36415 COLL VENOUS BLD VENIPUNCTURE: CPT

## 2019-05-01 PROCEDURE — 85730 THROMBOPLASTIN TIME PARTIAL: CPT

## 2019-05-01 PROCEDURE — 82550 ASSAY OF CK (CPK): CPT

## 2019-05-01 PROCEDURE — 71275 CT ANGIOGRAPHY CHEST: CPT

## 2019-05-01 PROCEDURE — 74177 CT ABD & PELVIS W/CONTRAST: CPT

## 2019-05-01 PROCEDURE — 85027 COMPLETE CBC AUTOMATED: CPT

## 2019-05-01 PROCEDURE — 71045 X-RAY EXAM CHEST 1 VIEW: CPT

## 2019-05-01 PROCEDURE — 80048 BASIC METABOLIC PNL TOTAL CA: CPT

## 2019-05-01 PROCEDURE — 97110 THERAPEUTIC EXERCISES: CPT

## 2019-05-01 PROCEDURE — 99285 EMERGENCY DEPT VISIT HI MDM: CPT | Mod: 25

## 2019-05-01 PROCEDURE — 80053 COMPREHEN METABOLIC PANEL: CPT

## 2019-05-01 PROCEDURE — 97163 PT EVAL HIGH COMPLEX 45 MIN: CPT

## 2019-05-01 PROCEDURE — 82553 CREATINE MB FRACTION: CPT

## 2019-05-01 PROCEDURE — 83690 ASSAY OF LIPASE: CPT

## 2019-05-01 PROCEDURE — 83735 ASSAY OF MAGNESIUM: CPT

## 2019-05-01 PROCEDURE — 83605 ASSAY OF LACTIC ACID: CPT

## 2019-05-01 PROCEDURE — 97116 GAIT TRAINING THERAPY: CPT

## 2019-05-01 PROCEDURE — 85610 PROTHROMBIN TIME: CPT

## 2019-05-01 PROCEDURE — 80307 DRUG TEST PRSMV CHEM ANLYZR: CPT

## 2019-05-01 PROCEDURE — 93005 ELECTROCARDIOGRAM TRACING: CPT

## 2019-05-01 PROCEDURE — 86803 HEPATITIS C AB TEST: CPT

## 2019-05-01 PROCEDURE — 84100 ASSAY OF PHOSPHORUS: CPT

## 2019-05-01 PROCEDURE — 81001 URINALYSIS AUTO W/SCOPE: CPT

## 2019-05-01 PROCEDURE — 82962 GLUCOSE BLOOD TEST: CPT

## 2019-05-01 PROCEDURE — 84484 ASSAY OF TROPONIN QUANT: CPT

## 2019-05-01 PROCEDURE — 93306 TTE W/DOPPLER COMPLETE: CPT

## 2019-05-01 PROCEDURE — 87040 BLOOD CULTURE FOR BACTERIA: CPT

## 2019-05-01 PROCEDURE — 70450 CT HEAD/BRAIN W/O DYE: CPT

## 2019-05-01 RX ORDER — ATORVASTATIN CALCIUM 80 MG/1
1 TABLET, FILM COATED ORAL
Qty: 0 | Refills: 0 | COMMUNITY

## 2019-05-01 RX ADMIN — SODIUM CHLORIDE 125 MILLILITER(S): 9 INJECTION INTRAMUSCULAR; INTRAVENOUS; SUBCUTANEOUS at 11:14

## 2019-05-01 RX ADMIN — ENOXAPARIN SODIUM 40 MILLIGRAM(S): 100 INJECTION SUBCUTANEOUS at 11:14

## 2019-05-01 RX ADMIN — AMLODIPINE BESYLATE 10 MILLIGRAM(S): 2.5 TABLET ORAL at 05:40

## 2019-05-01 RX ADMIN — SODIUM CHLORIDE 125 MILLILITER(S): 9 INJECTION INTRAMUSCULAR; INTRAVENOUS; SUBCUTANEOUS at 00:36

## 2019-05-01 NOTE — DISCHARGE NOTE NURSING/CASE MANAGEMENT/SOCIAL WORK - NSDCDPATPORTLINK_GEN_ALL_CORE
You can access the [email or web address] Portal, offered by [facility], by registering with the following website: [email or web address]

## 2019-05-01 NOTE — PROGRESS NOTE ADULT - ASSESSMENT
64 y/o M, with PMH significant for HTN and HLD, presents to the ED BIBA, s/p fall, around 7pm. Patient is a poor historian, states was walking through the parking lot of his apartment complex, when he stepped in a uneven surface, lost his balance and fell, denies any head trauma or LOC. Patient reports being unable to get on is own and was on the ground for approximately 2 hours, before a neighbor call an EMS. Patient admits having drinking alcohol (1 beer) yesterday prior to the fall. 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  Denies fever, chills, chest pain, cough, SOB, abd pain, N/V/D, back pain, neck pain, numbness, tingling, LOC, or HA.    1) Acute Rhabdomyolysis secondary to mechanical fall  - on NS at 125 cc/hour  - CK level improving, down to 5K from 12K  - OOB to chair and ambulation as tolerated    2) Mechanical fall  -Patient reports several episodes in the past, last fall a month ago  -Uses a walker in a regular basis  -Fall precautions  -PT recommends [facility]    3) RONAK: resolved  -Likely secondary to Rhabdo and Dehydration  -Will continue with IV hydration   -monitor Cr level, seems to be have resolved    4) HTN  -C/w Amlodipine and monitor    5) HLD  -hold Atorvastatin 40mg PO daily (home regimen)  -restart on discharged    6) Prophylactic measure   -DVT Prophylaxis: Lovenox SQ     7) Anxiety; patient notes that he feels 'excited' most of the time; he doesn't want to be medicated for this  - PRN xanax for anxiety  - hold off on other SSRI  - no hallucinations/psychosis present  - caretaker notes that patient has 1 beer a week on occasion; counseled her on avoidance given history of TBI    8) Leucocytosis; resolved and likely reactive    Dispo: stable for [facility] today

## 2019-05-01 NOTE — DISCHARGE NOTE PROVIDER - HOSPITAL COURSE
62 y/o M, with PMH significant for HTN and HLD, presents to the ED BIBA, s/p fall, around 7pm. Patient is a poor historian, states was walking through the parking lot of his apartment complex, when he stepped in a uneven surface, lost his balance and fell, denies any head trauma or LOC. Patient reports being unable to get on is own and was on the ground for approximately 2 hours, before a neighbor call an EMS. Patient admits having drinking alcohol (1 beer) yesterday prior to the fall. 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    Denies fever, chills, chest pain, cough, SOB, abd pain, N/V/D, back pain, neck pain, numbness, tingling, LOC, or HA.        1) Acute Rhabdomyolysis secondary to mechanical fall    -  s/p IVF    - CK level improving, down to 5K from 12K    - OOB to chair and ambulation as tolerated        2) Mechanical fall    -Patient reports several episodes in the past, last fall a month ago    -Uses a walker in a regular basis    -Fall precautions    -PT recommends [facility]        3) RONAK: resolved    -Likely secondary to Rhabdo and Dehydration    -s/p IVF    -monitor Cr level, seems to be have resolved        4) HTN    -C/w Amlodipine and monitor        5) HLD    -hold Atorvastatin 40mg PO daily (home regimen)    -restart on discharged once CK level <1k at [facility] facility        6) Leucocytosis; resolved and likely reactive        Dispo: stable for [facility] today        total time spent for discharge 36 minutes

## 2019-05-01 NOTE — DISCHARGE NOTE PROVIDER - NSDCCPCAREPLAN_GEN_ALL_CORE_FT
PRINCIPAL DISCHARGE DIAGNOSIS  Diagnosis: Rhabdomyolysis  Assessment and Plan of Treatment: improved      SECONDARY DISCHARGE DIAGNOSES  Diagnosis: Fall  Assessment and Plan of Treatment: needs [facility]

## 2019-05-01 NOTE — PROGRESS NOTE ADULT - SUBJECTIVE AND OBJECTIVE BOX
JUAN C BARR    [id number]    63y      Male    CC: S/P fall and unsteady gait    INTERVAL HPI/OVERNIGHT EVENTS:  64 y/o M, with PMH significant for HTN and HLD, presents to the ED BIBA, s/p fall, around 7pm. Patient is a poor historian, states was walking through the parking lot of his apartment complex, when he stepped in a uneven surface, lost his balance and fell, denies any head trauma or LOC. Patient reports being unable to get on is own and was on the ground for approximately 2 hours, before a neighbor call an EMS. Patient admits having drinking alcohol (1 beer) yesterday prior to the fall. 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  Denies fever, chills, chest pain, cough, SOB, abd pain, N/V/D, back pain, neck pain, numbness, tingling, LOC, or HA.    today pt denies any sob, no chest pain no n/v     REVIEW OF SYSTEMS:    CONSTITUTIONAL: No fever, + fatigue  RESPIRATORY: No cough, wheezing, hemoptysis; No shortness of breath  CARDIOVASCULAR: No chest pain, palpitations  GASTROINTESTINAL: No abdominal or epigastric pain. No nausea, vomiting        Vital Signs Last 24 Hrs  T(C): 36.7 (01 May 2019 10:16), Max: 37.1 (01 May 2019 04:43)  T(F): 98 (01 May 2019 10:16), Max: 98.7 (01 May 2019 04:43)  HR: 106 (01 May 2019 10:16) (97 - 113)  BP: 126/78 (01 May 2019 10:16) (126/78 - 151/74)  BP(mean): --  RR: 18 (01 May 2019 10:16) (18 - 18)  SpO2: 95% (01 May 2019 04:43) (93% - 95%)    PHYSICAL EXAM:  GENERAL: NAD, well-groomed  HEENT: PERRL, +EOMI  CHEST/LUNG: Clear to auscultation bilaterally; No wheezing  HEART: S1S2+, Regular rate and rhythm; No murmurs  ABDOMEN: Soft, Nontender, Nondistended; Bowel sounds present  EXTREMITIES:  2+ Peripheral Pulses, No clubbing, cyanosis, or edema + b/l lower ext knee bruises and skin scarping      LABS:                        14.5   10.2  )-----------( 254      ( 30 Apr 2019 07:54 )             42.7     04-30    138  |  104  |  8.0  ----------------------------<  91  4.0   |  20.0<L>  |  0.40<L>    Ca    8.5<L>      30 Apr 2019 07:54  Mg     1.7     04-30          MEDICATIONS  (STANDING):  amLODIPine   Tablet 10 milliGRAM(s) Oral daily  enoxaparin Injectable 40 milliGRAM(s) SubCutaneous daily  sodium chloride 0.9%. 1000 milliLiter(s) (125 mL/Hr) IV Continuous <Continuous>    MEDICATIONS  (PRN):  acetaminophen  Suppository .. 650 milliGRAM(s) Rectal every 6 hours PRN Temp greater or equal to 38C (100.4F), Moderate Pain (4 - 6)  ALPRAZolam 0.25 milliGRAM(s) Oral two times a day PRN anxiety

## 2019-05-01 NOTE — PROGRESS NOTE ADULT - REASON FOR ADMISSION
Mechanical fall
Statement Selected

## 2019-05-23 ENCOUNTER — EMERGENCY (EMERGENCY)
Facility: HOSPITAL | Age: 64
LOS: 1 days | Discharge: HOME | End: 2019-05-23
Attending: EMERGENCY MEDICINE
Payer: MEDICARE

## 2019-05-23 VITALS
HEART RATE: 98 BPM | SYSTOLIC BLOOD PRESSURE: 160 MMHG | TEMPERATURE: 100 F | OXYGEN SATURATION: 95 % | RESPIRATION RATE: 20 BRPM | DIASTOLIC BLOOD PRESSURE: 82 MMHG

## 2019-05-23 VITALS — WEIGHT: 207.01 LBS | HEIGHT: 66 IN

## 2019-05-23 LAB
ALBUMIN SERPL ELPH-MCNC: 3.7 G/DL — SIGNIFICANT CHANGE UP (ref 3.3–5.2)
ALP SERPL-CCNC: 101 U/L — SIGNIFICANT CHANGE UP (ref 40–120)
ALT FLD-CCNC: 19 U/L — SIGNIFICANT CHANGE UP
ANION GAP SERPL CALC-SCNC: 15 MMOL/L — SIGNIFICANT CHANGE UP (ref 5–17)
ANISOCYTOSIS BLD QL: SLIGHT — SIGNIFICANT CHANGE UP
APPEARANCE UR: CLEAR — SIGNIFICANT CHANGE UP
APTT BLD: 28.1 SEC — SIGNIFICANT CHANGE UP (ref 27.5–36.3)
AST SERPL-CCNC: 15 U/L — SIGNIFICANT CHANGE UP
BACTERIA # UR AUTO: ABNORMAL
BILIRUB SERPL-MCNC: 1 MG/DL — SIGNIFICANT CHANGE UP (ref 0.4–2)
BILIRUB UR-MCNC: NEGATIVE — SIGNIFICANT CHANGE UP
BUN SERPL-MCNC: 17 MG/DL — SIGNIFICANT CHANGE UP (ref 8–20)
CALCIUM SERPL-MCNC: 9.1 MG/DL — SIGNIFICANT CHANGE UP (ref 8.6–10.2)
CHLORIDE SERPL-SCNC: 102 MMOL/L — SIGNIFICANT CHANGE UP (ref 98–107)
CO2 SERPL-SCNC: 21 MMOL/L — LOW (ref 22–29)
COLOR SPEC: YELLOW — SIGNIFICANT CHANGE UP
CREAT SERPL-MCNC: 0.65 MG/DL — SIGNIFICANT CHANGE UP (ref 0.5–1.3)
DIFF PNL FLD: ABNORMAL
ELLIPTOCYTES BLD QL SMEAR: SLIGHT — SIGNIFICANT CHANGE UP
EPI CELLS # UR: SIGNIFICANT CHANGE UP
GLUCOSE SERPL-MCNC: 101 MG/DL — SIGNIFICANT CHANGE UP (ref 70–115)
GLUCOSE UR QL: NEGATIVE MG/DL — SIGNIFICANT CHANGE UP
HCT VFR BLD CALC: 42.7 % — SIGNIFICANT CHANGE UP (ref 42–52)
HGB BLD-MCNC: 13.9 G/DL — LOW (ref 14–18)
HYPOCHROMIA BLD QL: SLIGHT — SIGNIFICANT CHANGE UP
INR BLD: 1.14 RATIO — SIGNIFICANT CHANGE UP (ref 0.88–1.16)
KETONES UR-MCNC: NEGATIVE — SIGNIFICANT CHANGE UP
LACTATE BLDV-MCNC: 1.2 MMOL/L — SIGNIFICANT CHANGE UP (ref 0.5–2)
LEUKOCYTE ESTERASE UR-ACNC: ABNORMAL
LYMPHOCYTES # BLD AUTO: 5 % — LOW (ref 20–55)
MACROCYTES BLD QL: SLIGHT — SIGNIFICANT CHANGE UP
MCHC RBC-ENTMCNC: 29.2 PG — SIGNIFICANT CHANGE UP (ref 27–31)
MCHC RBC-ENTMCNC: 32.6 G/DL — SIGNIFICANT CHANGE UP (ref 32–36)
MCV RBC AUTO: 89.7 FL — SIGNIFICANT CHANGE UP (ref 80–94)
MICROCYTES BLD QL: SLIGHT — SIGNIFICANT CHANGE UP
MONOCYTES NFR BLD AUTO: 9 % — SIGNIFICANT CHANGE UP (ref 3–10)
NEUTROPHILS NFR BLD AUTO: 86 % — HIGH (ref 37–73)
NITRITE UR-MCNC: POSITIVE
PH UR: 6 — SIGNIFICANT CHANGE UP (ref 5–8)
PLAT MORPH BLD: NORMAL — SIGNIFICANT CHANGE UP
PLATELET # BLD AUTO: 258 K/UL — SIGNIFICANT CHANGE UP (ref 150–400)
POIKILOCYTOSIS BLD QL AUTO: SLIGHT — SIGNIFICANT CHANGE UP
POTASSIUM SERPL-MCNC: 4.2 MMOL/L — SIGNIFICANT CHANGE UP (ref 3.5–5.3)
POTASSIUM SERPL-SCNC: 4.2 MMOL/L — SIGNIFICANT CHANGE UP (ref 3.5–5.3)
PROT SERPL-MCNC: 6.9 G/DL — SIGNIFICANT CHANGE UP (ref 6.6–8.7)
PROT UR-MCNC: 30 MG/DL
PROTHROM AB SERPL-ACNC: 13.2 SEC — HIGH (ref 10–12.9)
RBC # BLD: 4.76 M/UL — SIGNIFICANT CHANGE UP (ref 4.6–6.2)
RBC # FLD: 14.1 % — SIGNIFICANT CHANGE UP (ref 11–15.6)
RBC BLD AUTO: ABNORMAL
RBC CASTS # UR COMP ASSIST: ABNORMAL /HPF (ref 0–4)
SODIUM SERPL-SCNC: 138 MMOL/L — SIGNIFICANT CHANGE UP (ref 135–145)
SP GR SPEC: 1.02 — SIGNIFICANT CHANGE UP (ref 1.01–1.02)
TROPONIN T SERPL-MCNC: <0.01 NG/ML — SIGNIFICANT CHANGE UP (ref 0–0.06)
UROBILINOGEN FLD QL: NEGATIVE MG/DL — SIGNIFICANT CHANGE UP
WBC # BLD: 20.5 K/UL — HIGH (ref 4.8–10.8)
WBC # FLD AUTO: 20.5 K/UL — HIGH (ref 4.8–10.8)
WBC UR QL: >50

## 2019-05-23 PROCEDURE — 81001 URINALYSIS AUTO W/SCOPE: CPT

## 2019-05-23 PROCEDURE — 84484 ASSAY OF TROPONIN QUANT: CPT

## 2019-05-23 PROCEDURE — 36415 COLL VENOUS BLD VENIPUNCTURE: CPT

## 2019-05-23 PROCEDURE — 93005 ELECTROCARDIOGRAM TRACING: CPT

## 2019-05-23 PROCEDURE — 85730 THROMBOPLASTIN TIME PARTIAL: CPT

## 2019-05-23 PROCEDURE — 85610 PROTHROMBIN TIME: CPT

## 2019-05-23 PROCEDURE — 93010 ELECTROCARDIOGRAM REPORT: CPT

## 2019-05-23 PROCEDURE — 71045 X-RAY EXAM CHEST 1 VIEW: CPT | Mod: 26

## 2019-05-23 PROCEDURE — 80053 COMPREHEN METABOLIC PANEL: CPT

## 2019-05-23 PROCEDURE — 87086 URINE CULTURE/COLONY COUNT: CPT

## 2019-05-23 PROCEDURE — 71045 X-RAY EXAM CHEST 1 VIEW: CPT

## 2019-05-23 PROCEDURE — 85027 COMPLETE CBC AUTOMATED: CPT

## 2019-05-23 PROCEDURE — 87040 BLOOD CULTURE FOR BACTERIA: CPT

## 2019-05-23 PROCEDURE — 99291 CRITICAL CARE FIRST HOUR: CPT

## 2019-05-23 PROCEDURE — 83605 ASSAY OF LACTIC ACID: CPT

## 2019-05-23 PROCEDURE — 96374 THER/PROPH/DIAG INJ IV PUSH: CPT

## 2019-05-23 PROCEDURE — 99285 EMERGENCY DEPT VISIT HI MDM: CPT | Mod: 25

## 2019-05-23 RX ORDER — IBUPROFEN 200 MG
600 TABLET ORAL ONCE
Refills: 0 | Status: COMPLETED | OUTPATIENT
Start: 2019-05-23 | End: 2019-05-23

## 2019-05-23 RX ORDER — ACETAMINOPHEN 500 MG
650 TABLET ORAL ONCE
Refills: 0 | Status: COMPLETED | OUTPATIENT
Start: 2019-05-23 | End: 2019-05-23

## 2019-05-23 RX ORDER — CEFTRIAXONE 500 MG/1
1 INJECTION, POWDER, FOR SOLUTION INTRAMUSCULAR; INTRAVENOUS ONCE
Refills: 0 | Status: COMPLETED | OUTPATIENT
Start: 2019-05-23 | End: 2019-05-23

## 2019-05-23 RX ORDER — SODIUM CHLORIDE 9 MG/ML
2900 INJECTION INTRAMUSCULAR; INTRAVENOUS; SUBCUTANEOUS ONCE
Refills: 0 | Status: COMPLETED | OUTPATIENT
Start: 2019-05-23 | End: 2019-05-23

## 2019-05-23 RX ORDER — CEPHALEXIN 500 MG
1 CAPSULE ORAL
Qty: 40 | Refills: 0
Start: 2019-05-23 | End: 2019-06-01

## 2019-05-23 RX ADMIN — Medication 650 MILLIGRAM(S): at 12:56

## 2019-05-23 RX ADMIN — CEFTRIAXONE 1 GRAM(S): 500 INJECTION, POWDER, FOR SOLUTION INTRAMUSCULAR; INTRAVENOUS at 12:56

## 2019-05-23 RX ADMIN — SODIUM CHLORIDE 2900 MILLILITER(S): 9 INJECTION INTRAMUSCULAR; INTRAVENOUS; SUBCUTANEOUS at 12:52

## 2019-05-23 RX ADMIN — Medication 600 MILLIGRAM(S): at 19:31

## 2019-05-23 RX ADMIN — CEFTRIAXONE 100 GRAM(S): 500 INJECTION, POWDER, FOR SOLUTION INTRAMUSCULAR; INTRAVENOUS at 12:56

## 2019-05-23 NOTE — ED PROVIDER NOTE - CLINICAL SUMMARY MEDICAL DECISION MAKING FREE TEXT BOX
code sepsis and protocols followed given no other c/o than hematuria normal oxygen sat/lungs cta will treat for presumed UTI -re-eval after fluids/abx/antipyretics

## 2019-05-23 NOTE — ED ADULT TRIAGE NOTE - CHIEF COMPLAINT QUOTE
Pt comes from [facility], facility wants pt checked out for sepsis, states he has an elevated WBC count of 20.6 and gross hematuria x 2 days, hx of TBI

## 2019-05-23 NOTE — ED PROVIDER NOTE - PROGRESS NOTE DETAILS
pt wants to go home - feels well will dc on abx for UTI reports given for [facility] and can follow with staff docs

## 2019-05-23 NOTE — ED ADULT NURSE NOTE - CHIEF COMPLAINT QUOTE
Pt comes from [facility], facility wants pt checked out for sepsis, states he has an elevated WBC count of 20.6 and gross hematuria x 2 days, hx of TBI

## 2019-05-23 NOTE — ED ADULT NURSE NOTE - OBJECTIVE STATEMENT
received pt from critical care at 1310, pt resting on stretcher, a&ox3 with repetitive questions. pt states he was sent in for elevated blood count, reports he is urinating yellow now. denies any pain at this time.

## 2019-05-23 NOTE — ED ADULT NURSE NOTE - NSIMPLEMENTINTERV_GEN_ALL_ED
Implemented All Fall Risk Interventions:  [place] to call system. Call bell, personal items and telephone within reach. Instruct patient to call for assistance. Room bathroom lighting operational. Non-slip footwear when patient is off stretcher. Physically safe environment: no spills, clutter or unnecessary equipment. Stretcher in lowest position, wheels locked, appropriate side rails in place. Provide visual cue, wrist band, yellow gown, etc. Monitor gait and stability. Monitor for mental status changes and reorient to person, place, and time. Review medications for side effects contributing to fall risk. Reinforce activity limits and safety measures with patient and family.

## 2019-05-23 NOTE — ED PROVIDER NOTE - OBJECTIVE STATEMENT
64 y/o M sent from [facility] for hematuria and elev WBC ct 62 y/o M sent from [facility] for hematuria and elev WBC ct and fever = pt offers no c/o has a hx of TBI and is in a diaper   no cough - no abd pain no rashes no known ill contacts

## 2019-05-24 LAB
CULTURE RESULTS: NO GROWTH — SIGNIFICANT CHANGE UP
SPECIMEN SOURCE: SIGNIFICANT CHANGE UP

## 2019-05-28 LAB
CULTURE RESULTS: SIGNIFICANT CHANGE UP
CULTURE RESULTS: SIGNIFICANT CHANGE UP
SPECIMEN SOURCE: SIGNIFICANT CHANGE UP
SPECIMEN SOURCE: SIGNIFICANT CHANGE UP

## 2019-06-07 PROBLEM — S06.9X9A UNSPECIFIED INTRACRANIAL INJURY WITH LOSS OF CONSCIOUSNESS OF UNSPECIFIED DURATION, INITIAL ENCOUNTER: Chronic | Status: ACTIVE | Noted: 2019-05-23

## 2019-06-13 ENCOUNTER — APPOINTMENT (OUTPATIENT)
Dept: FAMILY MEDICINE | Facility: CLINIC | Age: 64
End: 2019-06-13
Payer: MEDICARE

## 2019-06-13 VITALS
SYSTOLIC BLOOD PRESSURE: 126 MMHG | BODY MASS INDEX: 29.57 KG/M2 | RESPIRATION RATE: 14 BRPM | DIASTOLIC BLOOD PRESSURE: 70 MMHG | HEART RATE: 80 BPM | WEIGHT: 184 LBS | HEIGHT: 66 IN | OXYGEN SATURATION: 97 %

## 2019-06-13 DIAGNOSIS — Z87.440 PERSONAL HISTORY OF URINARY (TRACT) INFECTIONS: ICD-10-CM

## 2019-06-13 PROCEDURE — 36415 COLL VENOUS BLD VENIPUNCTURE: CPT

## 2019-06-13 PROCEDURE — 99214 OFFICE O/P EST MOD 30 MIN: CPT | Mod: 25

## 2019-06-13 NOTE — HEALTH RISK ASSESSMENT
[Intercurrent hospitalizations] : was admitted to the hospital  [Any fall with injury in past year] : Patient reported fall with injury in the past year [de-identified] : occ [] : No

## 2019-06-13 NOTE — ASSESSMENT
[FreeTextEntry1] : r/o vestibular dz. Rec ENT eval.\par Pt encouraged to lose wt.\par Restart HCTZ 12.5 mg/d.\par f/u 1 month.

## 2019-06-13 NOTE — HISTORY OF PRESENT ILLNESS
[Post-hospitalization from ___ Hospital] : Post-hospitalization from [unfilled] Hospital [Admitted on: ___] : The patient was admitted on [unfilled] [Discharged on ___] : discharged on [unfilled] [FreeTextEntry2] : DC to [facility] , dc on 5/25/19.  Pt was TX at [facility] for injuries s/p fall in parking lot. Pt lost his balance on uneven ground. No head trauma or LOC. No fxs.  Pt DX Acute Rhabdomyolysis, pt was dc when CK decreased.  Pt is feeling better after rehab. Denies pain or urinary symptoms today. Had returned to ER after Rehab due to urinary burning and hematuria and txed for UTI.\par CK peaked at 13,449 on 4/26/19 and was down to 5,050 on 5/1/19. Creatinine had increased to 1.39 and back to baseline on d/c, 0.65 on 5/23/19.\par Aid notes pt seems to suddenly experience a change in balance at times, that is transient. She feels he may be experiencing some dizziness. Pt unable to express himself well. He can not confirm or deny this.

## 2019-06-13 NOTE — COUNSELING
[Healthy eating counseling provided] : healthy eating [Weight management counseling provided] : Weight management [Activity counseling provided] : activity [Needs reinforcement, provided] : Patient needs reinforcement on understanding lifestyle changes and  the steps needed to achieve self management goals and reinforcement was provided

## 2019-06-13 NOTE — PHYSICAL EXAM
[Normal Sclera/Conjunctiva] : normal sclera/conjunctiva [No Acute Distress] : no acute distress [Supple] : supple [Normal TMs] : both tympanic membranes were normal [Normal Oropharynx] : the oropharynx was normal [No Lymphadenopathy] : no lymphadenopathy [No Respiratory Distress] : no respiratory distress  [Clear to Auscultation] : lungs were clear to auscultation bilaterally [Normal Rate] : normal [Rhythm Regular] : regular [Normal S1] : normal S1 [Normal S2] : normal S2 [II] : a grade 2 [Soft] : abdomen soft [No Carotid Bruits] : no carotid bruits [Normal Anterior Cervical Nodes] : no anterior cervical lymphadenopathy [Non Tender] : non-tender [Coordination Grossly Intact] : coordination grossly intact [Normal Gait] : normal gait [Normal Mood] : the mood was normal [de-identified] : Mild edema of LEs b/l [No Focal Deficits] : no focal deficits [de-identified] : Fleshy growth right upper eyelid medially, and cystic growth right upper lateral eyelid.

## 2019-07-01 LAB
ANION GAP SERPL CALC-SCNC: 12 MMOL/L
BASOPHILS # BLD AUTO: 0.02 K/UL
BASOPHILS NFR BLD AUTO: 0.2 %
BUN SERPL-MCNC: 16 MG/DL
CALCIUM SERPL-MCNC: 9.5 MG/DL
CHLORIDE SERPL-SCNC: 105 MMOL/L
CK SERPL-CCNC: 40 U/L
CO2 SERPL-SCNC: 22 MMOL/L
CREAT SERPL-MCNC: 0.68 MG/DL
EOSINOPHIL # BLD AUTO: 0.21 K/UL
EOSINOPHIL NFR BLD AUTO: 2.3 %
GLUCOSE SERPL-MCNC: 99 MG/DL
HCT VFR BLD CALC: 45.6 %
HGB BLD-MCNC: 14.4 G/DL
IMM GRANULOCYTES NFR BLD AUTO: 0.2 %
LYMPHOCYTES # BLD AUTO: 1.06 K/UL
LYMPHOCYTES NFR BLD AUTO: 11.4 %
MAN DIFF?: NORMAL
MCHC RBC-ENTMCNC: 28.8 PG
MCHC RBC-ENTMCNC: 31.6 GM/DL
MCV RBC AUTO: 91.2 FL
MONOCYTES # BLD AUTO: 0.9 K/UL
MONOCYTES NFR BLD AUTO: 9.7 %
NEUTROPHILS # BLD AUTO: 7.05 K/UL
NEUTROPHILS NFR BLD AUTO: 76.2 %
PLATELET # BLD AUTO: 347 K/UL
POTASSIUM SERPL-SCNC: 4.4 MMOL/L
RBC # BLD: 5 M/UL
RBC # FLD: 14.4 %
SODIUM SERPL-SCNC: 139 MMOL/L
WBC # FLD AUTO: 9.26 K/UL

## 2019-07-18 ENCOUNTER — APPOINTMENT (OUTPATIENT)
Dept: FAMILY MEDICINE | Facility: CLINIC | Age: 64
End: 2019-07-18
Payer: MEDICARE

## 2019-07-18 VITALS
HEART RATE: 78 BPM | RESPIRATION RATE: 14 BRPM | DIASTOLIC BLOOD PRESSURE: 66 MMHG | BODY MASS INDEX: 29.41 KG/M2 | HEIGHT: 66 IN | SYSTOLIC BLOOD PRESSURE: 126 MMHG | WEIGHT: 183 LBS

## 2019-07-18 DIAGNOSIS — N17.9 ACUTE KIDNEY FAILURE, UNSPECIFIED: ICD-10-CM

## 2019-07-18 DIAGNOSIS — Z23 ENCOUNTER FOR IMMUNIZATION: ICD-10-CM

## 2019-07-18 PROCEDURE — 99213 OFFICE O/P EST LOW 20 MIN: CPT

## 2019-07-18 NOTE — HISTORY OF PRESENT ILLNESS
[FreeTextEntry1] : Patient at office for follow up on blood pressure, requesting renewal on Atenolol to local pharmacy. States he feels well.\par He is trying to lose wt and eat healthier.\par No falls since last visit. Using rolling walker now and feeling more steady and confident on his feet.\par Had fall in May, on floor for 2 hours and admitted for rhabdomyolysis.\par Denies urinary symptoms today. Had mild dysuria last month. Urinalysis done at [facility].

## 2019-07-18 NOTE — PHYSICAL EXAM
[No Acute Distress] : no acute distress [Normal Sclera/Conjunctiva] : normal sclera/conjunctiva [No Lymphadenopathy] : no lymphadenopathy [No Respiratory Distress] : no respiratory distress  [Clear to Auscultation] : lungs were clear to auscultation bilaterally [Normal Rate] : normal rate  [Regular Rhythm] : with a regular rhythm [Normal S1, S2] : normal S1 and S2 [II] : a grade 2 [No Edema] : there was no peripheral edema [Grossly Normal Strength/Tone] : grossly normal strength/tone [Normal Mood] : the mood was normal

## 2019-07-18 NOTE — HEALTH RISK ASSESSMENT
[Yes] : Yes [2 - 4 times a month (2 pts)] : 2-4 times a month (2 points) [1 or 2 (0 pts)] : 1 or 2 (0 points) [Never (0 pts)] : Never (0 points) [No] : In the past 12 months have you used drugs other than those required for medical reasons? No [] : No [Audit-CScore] : 2

## 2019-10-23 ENCOUNTER — APPOINTMENT (OUTPATIENT)
Dept: FAMILY MEDICINE | Facility: CLINIC | Age: 64
End: 2019-10-23
Payer: MEDICARE

## 2019-10-23 VITALS
HEART RATE: 77 BPM | TEMPERATURE: 97.5 F | WEIGHT: 186 LBS | HEIGHT: 66 IN | SYSTOLIC BLOOD PRESSURE: 140 MMHG | DIASTOLIC BLOOD PRESSURE: 70 MMHG | OXYGEN SATURATION: 97 % | BODY MASS INDEX: 29.89 KG/M2

## 2019-10-23 PROCEDURE — 90653 IIV ADJUVANT VACCINE IM: CPT

## 2019-10-23 PROCEDURE — 99213 OFFICE O/P EST LOW 20 MIN: CPT | Mod: 25

## 2019-10-23 PROCEDURE — G0008: CPT

## 2019-10-24 NOTE — PHYSICAL EXAM
[No Acute Distress] : no acute distress [Normal Sclera/Conjunctiva] : normal sclera/conjunctiva [No Respiratory Distress] : no respiratory distress  [No Lymphadenopathy] : no lymphadenopathy [Normal Rate] : normal rate  [Clear to Auscultation] : lungs were clear to auscultation bilaterally [Regular Rhythm] : with a regular rhythm [Normal S1, S2] : normal S1 and S2 [II] : a grade 2 [Grossly Normal Strength/Tone] : grossly normal strength/tone [No Edema] : there was no peripheral edema [Normal Mood] : the mood was normal

## 2019-10-24 NOTE — PLAN
[FreeTextEntry1] : Pt has been off of HCTZ. Restart  HCTZ 12.5 mg/d.\par Recheck 3 months.\par Fasting bw before f/u.

## 2019-10-24 NOTE — HISTORY OF PRESENT ILLNESS
[FreeTextEntry1] : Pt is here to f/u with hyperlipidemia and hypertension. \par No c/o.\par No falls since last visit.

## 2020-01-08 ENCOUNTER — APPOINTMENT (OUTPATIENT)
Dept: FAMILY MEDICINE | Facility: CLINIC | Age: 65
End: 2020-01-08
Payer: MEDICARE

## 2020-01-08 VITALS
DIASTOLIC BLOOD PRESSURE: 80 MMHG | OXYGEN SATURATION: 97 % | BODY MASS INDEX: 29.73 KG/M2 | HEART RATE: 74 BPM | HEIGHT: 66 IN | TEMPERATURE: 97.2 F | SYSTOLIC BLOOD PRESSURE: 144 MMHG | WEIGHT: 185 LBS

## 2020-01-08 VITALS — SYSTOLIC BLOOD PRESSURE: 156 MMHG | DIASTOLIC BLOOD PRESSURE: 80 MMHG

## 2020-01-08 DIAGNOSIS — Z92.29 PERSONAL HISTORY OF OTHER DRUG THERAPY: ICD-10-CM

## 2020-01-08 PROCEDURE — 99214 OFFICE O/P EST MOD 30 MIN: CPT | Mod: 25

## 2020-01-08 PROCEDURE — G0009: CPT

## 2020-01-08 PROCEDURE — 36415 COLL VENOUS BLD VENIPUNCTURE: CPT

## 2020-01-08 PROCEDURE — 90670 PCV13 VACCINE IM: CPT

## 2020-01-08 RX ORDER — ZOSTER VACCINE RECOMBINANT, ADJUVANTED 50 MCG/0.5
50 KIT INTRAMUSCULAR
Qty: 1 | Refills: 1 | Status: DISCONTINUED | COMMUNITY
Start: 2019-07-18 | End: 2020-01-08

## 2020-01-08 RX ORDER — HYDROCHLOROTHIAZIDE 12.5 MG/1
12.5 CAPSULE ORAL
Qty: 90 | Refills: 1 | Status: DISCONTINUED | COMMUNITY
Start: 2019-03-28 | End: 2020-01-08

## 2020-01-08 NOTE — HISTORY OF PRESENT ILLNESS
[de-identified] : Lost 10 lbs in the past 9 months. Eating healthier. Has assist from [facility]. [FreeTextEntry1] : Pt here to follow up on HTN/ hyperlipidemia and rx refills.\par No falls since last visit.\par Ambulates with rolling walker.

## 2020-01-09 LAB
ANION GAP SERPL CALC-SCNC: 14 MMOL/L
BUN SERPL-MCNC: 17 MG/DL
CALCIUM SERPL-MCNC: 9.8 MG/DL
CHLORIDE SERPL-SCNC: 101 MMOL/L
CO2 SERPL-SCNC: 24 MMOL/L
CREAT SERPL-MCNC: 0.8 MG/DL
GLUCOSE SERPL-MCNC: 87 MG/DL
POTASSIUM SERPL-SCNC: 4.7 MMOL/L
SODIUM SERPL-SCNC: 139 MMOL/L
TSH SERPL-ACNC: 2.49 UIU/ML

## 2020-02-12 ENCOUNTER — APPOINTMENT (OUTPATIENT)
Dept: FAMILY MEDICINE | Facility: CLINIC | Age: 65
End: 2020-02-12
Payer: MEDICARE

## 2020-02-12 VITALS
WEIGHT: 187 LBS | OXYGEN SATURATION: 95 % | BODY MASS INDEX: 30.05 KG/M2 | HEART RATE: 79 BPM | SYSTOLIC BLOOD PRESSURE: 130 MMHG | HEIGHT: 66 IN | DIASTOLIC BLOOD PRESSURE: 70 MMHG

## 2020-02-12 PROCEDURE — 36415 COLL VENOUS BLD VENIPUNCTURE: CPT

## 2020-02-12 PROCEDURE — 99213 OFFICE O/P EST LOW 20 MIN: CPT | Mod: 25

## 2020-02-12 NOTE — PHYSICAL EXAM
[No Acute Distress] : no acute distress [Normal Sclera/Conjunctiva] : normal sclera/conjunctiva [No Lymphadenopathy] : no lymphadenopathy [Supple] : supple [No Respiratory Distress] : no respiratory distress  [Clear to Auscultation] : lungs were clear to auscultation bilaterally [Normal Rate] : normal rate  [Regular Rhythm] : with a regular rhythm [II] : a grade 2 [Normal S1, S2] : normal S1 and S2 [Grossly Normal Strength/Tone] : grossly normal strength/tone [No Edema] : there was no peripheral edema [Normal Mood] : the mood was normal

## 2020-02-26 LAB
ALBUMIN SERPL ELPH-MCNC: 4.5 G/DL
ALP BLD-CCNC: 91 U/L
ALT SERPL-CCNC: 19 U/L
ANION GAP SERPL CALC-SCNC: 16 MMOL/L
AST SERPL-CCNC: 16 U/L
BASOPHILS # BLD AUTO: 0.04 K/UL
BASOPHILS NFR BLD AUTO: 0.4 %
BILIRUB SERPL-MCNC: 0.5 MG/DL
BUN SERPL-MCNC: 21 MG/DL
CALCIUM SERPL-MCNC: 9.6 MG/DL
CHLORIDE SERPL-SCNC: 102 MMOL/L
CHOLEST SERPL-MCNC: 174 MG/DL
CHOLEST/HDLC SERPL: 3.8 RATIO
CO2 SERPL-SCNC: 18 MMOL/L
CREAT SERPL-MCNC: 0.71 MG/DL
EOSINOPHIL # BLD AUTO: 0.13 K/UL
EOSINOPHIL NFR BLD AUTO: 1.3 %
ESTIMATED AVERAGE GLUCOSE: 126 MG/DL
GLUCOSE SERPL-MCNC: 87 MG/DL
HBA1C MFR BLD HPLC: 6 %
HCT VFR BLD CALC: 47.2 %
HDLC SERPL-MCNC: 46 MG/DL
HGB BLD-MCNC: 15.5 G/DL
IMM GRANULOCYTES NFR BLD AUTO: 0.7 %
LDLC SERPL CALC-MCNC: 110 MG/DL
LYMPHOCYTES # BLD AUTO: 1.27 K/UL
LYMPHOCYTES NFR BLD AUTO: 13.1 %
MAN DIFF?: NORMAL
MCHC RBC-ENTMCNC: 29.5 PG
MCHC RBC-ENTMCNC: 32.8 GM/DL
MCV RBC AUTO: 89.7 FL
MONOCYTES # BLD AUTO: 0.97 K/UL
MONOCYTES NFR BLD AUTO: 10 %
NEUTROPHILS # BLD AUTO: 7.18 K/UL
NEUTROPHILS NFR BLD AUTO: 74.5 %
PLATELET # BLD AUTO: 292 K/UL
POTASSIUM SERPL-SCNC: 4.4 MMOL/L
PROT SERPL-MCNC: 7.5 G/DL
PSA SERPL-MCNC: 8.47 NG/ML
RBC # BLD: 5.26 M/UL
RBC # FLD: 13.7 %
SODIUM SERPL-SCNC: 136 MMOL/L
TRIGL SERPL-MCNC: 92 MG/DL
TSH SERPL-ACNC: 2.41 UIU/ML
WBC # FLD AUTO: 9.66 K/UL

## 2020-03-09 ENCOUNTER — APPOINTMENT (OUTPATIENT)
Dept: FAMILY MEDICINE | Facility: CLINIC | Age: 65
End: 2020-03-09
Payer: MEDICARE

## 2020-03-09 VITALS
HEIGHT: 66 IN | SYSTOLIC BLOOD PRESSURE: 126 MMHG | WEIGHT: 185 LBS | OXYGEN SATURATION: 96 % | DIASTOLIC BLOOD PRESSURE: 78 MMHG | TEMPERATURE: 97.9 F | BODY MASS INDEX: 29.73 KG/M2 | HEART RATE: 85 BPM

## 2020-03-09 PROCEDURE — 99214 OFFICE O/P EST MOD 30 MIN: CPT

## 2020-03-09 NOTE — ASSESSMENT
[FreeTextEntry1] : Low fat/ low cholesterol / no concentrated sweets diet.\par F/U 3 months.\par Appt expedited for urology.

## 2020-03-09 NOTE — PHYSICAL EXAM
[No Acute Distress] : no acute distress [Normal Sclera/Conjunctiva] : normal sclera/conjunctiva [No Lymphadenopathy] : no lymphadenopathy [Supple] : supple [No Respiratory Distress] : no respiratory distress  [Clear to Auscultation] : lungs were clear to auscultation bilaterally [Normal Rate] : normal rate  [Regular Rhythm] : with a regular rhythm [Normal S1, S2] : normal S1 and S2 [II] : a grade 2 [No Edema] : there was no peripheral edema [Obese] : obese [Soft, Nontender] : the abdomen was soft and nontender [No Mass] : no masses were palpated [None] : no CVA tenderness [Normal] : no posterior cervical lymphadenopathy and no anterior cervical lymphadenopathy [Grossly Normal Strength/Tone] : grossly normal strength/tone [Normal Mood] : the mood was normal [de-identified] : anxious

## 2020-03-09 NOTE — HISTORY OF PRESENT ILLNESS
[FreeTextEntry1] : Patient is here for blood work follow up. \par Patient states he is feeling well today.  [de-identified] : 64 y.o mentally disabled male found to have elevated PSA of 8.47. He denies any symptoms of urinary retention, weak stream, urine frequency, nocturia, or blood in the urine.\par BW also revealed HBA1C of 6.0. He has been trying to lose wt. No regular exercise. Uses rolling walker for ambulation.\par LDL increased compared to last year and is 110.

## 2020-03-12 ENCOUNTER — APPOINTMENT (OUTPATIENT)
Dept: UROLOGY | Facility: CLINIC | Age: 65
End: 2020-03-12

## 2020-06-10 ENCOUNTER — APPOINTMENT (OUTPATIENT)
Dept: FAMILY MEDICINE | Facility: CLINIC | Age: 65
End: 2020-06-10
Payer: MEDICARE

## 2020-06-10 VITALS
HEART RATE: 113 BPM | SYSTOLIC BLOOD PRESSURE: 130 MMHG | OXYGEN SATURATION: 95 % | DIASTOLIC BLOOD PRESSURE: 90 MMHG | HEIGHT: 66 IN | BODY MASS INDEX: 28.93 KG/M2 | WEIGHT: 180 LBS | TEMPERATURE: 98.8 F

## 2020-06-10 DIAGNOSIS — L40.9 PSORIASIS, UNSPECIFIED: ICD-10-CM

## 2020-06-10 LAB
ANION GAP SERPL CALC-SCNC: 13 MMOL/L
BUN SERPL-MCNC: 20 MG/DL
CALCIUM SERPL-MCNC: 9.7 MG/DL
CHLORIDE SERPL-SCNC: 106 MMOL/L
CO2 SERPL-SCNC: 24 MMOL/L
CREAT SERPL-MCNC: 0.76 MG/DL
ESTIMATED AVERAGE GLUCOSE: 120 MG/DL
GLUCOSE SERPL-MCNC: 99 MG/DL
HBA1C MFR BLD HPLC: 5.8 %
POTASSIUM SERPL-SCNC: 4.2 MMOL/L
SODIUM SERPL-SCNC: 142 MMOL/L

## 2020-06-10 PROCEDURE — 36415 COLL VENOUS BLD VENIPUNCTURE: CPT

## 2020-06-10 PROCEDURE — 99214 OFFICE O/P EST MOD 30 MIN: CPT | Mod: 25

## 2020-06-10 NOTE — HISTORY OF PRESENT ILLNESS
[FreeTextEntry1] : Pt c/o itchy rashes on head.\par Pt here also for HTN follow up.\par Pt has an elevated PSA and had appt made for him for urology evaluation but he did not show for it. He has nocturia x 1.

## 2020-06-10 NOTE — PHYSICAL EXAM
[No Acute Distress] : no acute distress [Normal Oropharynx] : the oropharynx was normal [Normal Sclera/Conjunctiva] : normal sclera/conjunctiva [No Lymphadenopathy] : no lymphadenopathy [No Respiratory Distress] : no respiratory distress  [Normal Rate] : normal [Clear to Auscultation] : lungs were clear to auscultation bilaterally [Rhythm Regular] : regular [Normal S2] : normal S2 [Normal S1] : normal S1 [II] : a grade 2 [No Edema] : there was no peripheral edema [No Focal Deficits] : no focal deficits [Normal] : affect was normal and insight and judgment were intact [de-identified] : plaque rash mildly erythematous with white scales noted on the scalp. Erythematous plaque left inguinal

## 2020-06-10 NOTE — PLAN
[FreeTextEntry1] : Spoke with pt and with GONZALEZ Rowland to discuss the importance of following up with Urology for elevated PSA which may reflect enlargement of the prostate, or possibly a prostate cancer.\par F/u with Dermatology for evaluation of psoriasis.\par F/U 3 months.\par Continued wt loss / diet adjustment discussed.\par Labs drawn in office.\par

## 2020-10-01 ENCOUNTER — APPOINTMENT (OUTPATIENT)
Dept: FAMILY MEDICINE | Facility: CLINIC | Age: 65
End: 2020-10-01
Payer: MEDICARE

## 2020-10-01 ENCOUNTER — NON-APPOINTMENT (OUTPATIENT)
Age: 65
End: 2020-10-01

## 2020-10-01 VITALS
TEMPERATURE: 96.5 F | SYSTOLIC BLOOD PRESSURE: 140 MMHG | DIASTOLIC BLOOD PRESSURE: 76 MMHG | WEIGHT: 181 LBS | OXYGEN SATURATION: 95 % | HEART RATE: 121 BPM | BODY MASS INDEX: 29.09 KG/M2 | HEIGHT: 66 IN

## 2020-10-01 PROCEDURE — 90662 IIV NO PRSV INCREASED AG IM: CPT

## 2020-10-01 PROCEDURE — G0008: CPT

## 2020-10-01 PROCEDURE — 93000 ELECTROCARDIOGRAM COMPLETE: CPT

## 2020-10-01 PROCEDURE — 36415 COLL VENOUS BLD VENIPUNCTURE: CPT

## 2020-10-01 PROCEDURE — 99214 OFFICE O/P EST MOD 30 MIN: CPT | Mod: 25

## 2020-10-01 NOTE — PLAN
[FreeTextEntry1] : The pt was counseled on the risks and benefits of influenza vaccination. Side effects were reviewed with the patient, including risk for redness or soreness at the site of the injection, fever, aches, itching, headaches and fatigue.\par Labs drawn in office.\par Discussed importance of following up with urology to evaluate elevated PSA.  This was also brought up previously with the patient's aide regarding importance of evaluating the increase in PSA which may be related to a prostate cancer. Appts for urology had previously been expedited for pt and he has refused to go.\par Will again, expedite appt for pt for urology evaluation.\par Increase Atenolol from 25 mg once daily to 25 mg bid due to increase in bp and resting tachycardia.\par Cardio eval.\par F/U 2 wks.

## 2020-10-01 NOTE — PHYSICAL EXAM
[No Acute Distress] : no acute distress [Normal Sclera/Conjunctiva] : normal sclera/conjunctiva [Normal Oropharynx] : the oropharynx was normal [No Lymphadenopathy] : no lymphadenopathy [No Respiratory Distress] : no respiratory distress  [Clear to Auscultation] : lungs were clear to auscultation bilaterally [Normal Rate] : normal [Rhythm Regular] : regular [Normal S1] : normal S1 [Normal S2] : normal S2 [II] : a grade 2 [No Edema] : there was no peripheral edema [No Focal Deficits] : no focal deficits [Normal] : affect was normal and insight and judgment were intact [de-identified] : plaque rash mildly erythematous with white scales noted on the scalp. Erythematous plaque left inguinal

## 2020-10-01 NOTE — HISTORY OF PRESENT ILLNESS
[FreeTextEntry1] : Pt here to follow up on HTN.\par No c/o.\par Denies having any falls.\par Ambulates with rolling walker.\par

## 2020-10-06 LAB
ALBUMIN SERPL ELPH-MCNC: 4.5 G/DL
ALP BLD-CCNC: 73 U/L
ALT SERPL-CCNC: 18 U/L
AST SERPL-CCNC: 10 U/L
BASOPHILS # BLD AUTO: 0.04 K/UL
BASOPHILS NFR BLD AUTO: 0.4 %
BILIRUB SERPL-MCNC: 0.4 MG/DL
BUN SERPL-MCNC: 15 MG/DL
CALCIUM SERPL-MCNC: 9.5 MG/DL
CHLORIDE SERPL-SCNC: 99 MMOL/L
CO2 SERPL-SCNC: 23 MMOL/L
CREAT SERPL-MCNC: 0.8 MG/DL
EOSINOPHIL # BLD AUTO: 0.16 K/UL
EOSINOPHIL NFR BLD AUTO: 1.5 %
ESTIMATED AVERAGE GLUCOSE: 114 MG/DL
GLUCOSE SERPL-MCNC: 128 MG/DL
HBA1C MFR BLD HPLC: 5.6 %
HCT VFR BLD CALC: 46.4 %
HGB BLD-MCNC: 15.1 G/DL
IMM GRANULOCYTES NFR BLD AUTO: 0.6 %
LYMPHOCYTES # BLD AUTO: 1.04 K/UL
LYMPHOCYTES NFR BLD AUTO: 9.6 %
MAGNESIUM SERPL-MCNC: 2.2 MG/DL
MAN DIFF?: NORMAL
MCHC RBC-ENTMCNC: 29.8 PG
MCHC RBC-ENTMCNC: 32.5 GM/DL
MCV RBC AUTO: 91.7 FL
MONOCYTES # BLD AUTO: 1.03 K/UL
MONOCYTES NFR BLD AUTO: 9.5 %
NEUTROPHILS # BLD AUTO: 8.52 K/UL
NEUTROPHILS NFR BLD AUTO: 78.4 %
PLATELET # BLD AUTO: 322 K/UL
POTASSIUM SERPL-SCNC: 4.1 MMOL/L
PROT SERPL-MCNC: 6.8 G/DL
RBC # BLD: 5.06 M/UL
RBC # FLD: 14.4 %
SODIUM SERPL-SCNC: 137 MMOL/L
TSH SERPL-ACNC: 2.82 UIU/ML
WBC # FLD AUTO: 10.85 K/UL

## 2020-10-22 ENCOUNTER — NON-APPOINTMENT (OUTPATIENT)
Age: 65
End: 2020-10-22

## 2020-10-22 ENCOUNTER — APPOINTMENT (OUTPATIENT)
Dept: FAMILY MEDICINE | Facility: CLINIC | Age: 65
End: 2020-10-22
Payer: MEDICARE

## 2020-10-22 VITALS
WEIGHT: 181 LBS | BODY MASS INDEX: 29.09 KG/M2 | DIASTOLIC BLOOD PRESSURE: 66 MMHG | OXYGEN SATURATION: 95 % | HEIGHT: 66 IN | SYSTOLIC BLOOD PRESSURE: 140 MMHG | HEART RATE: 81 BPM | TEMPERATURE: 97.1 F

## 2020-10-22 PROCEDURE — 93000 ELECTROCARDIOGRAM COMPLETE: CPT

## 2020-10-22 PROCEDURE — 99213 OFFICE O/P EST LOW 20 MIN: CPT | Mod: 25

## 2020-10-22 PROCEDURE — 99072 ADDL SUPL MATRL&STAF TM PHE: CPT

## 2020-10-22 NOTE — DATA REVIEWED
[FreeTextEntry1] : EKG: NSR 78 bpm.  Q waves in III.  LVH by criteria.  No acute or interval change.

## 2020-10-22 NOTE — HISTORY OF PRESENT ILLNESS
[FreeTextEntry1] : Pt here to follow up on HTN.\par Patient had a resting tachycardia with heart rate 120 bpm 3 weeks ago as well as a mildly elevated blood pressure.  Atenolol was increased from 1 tab once daily to 1 tab twice daily.  He is here to follow-up since his change in medication.\par He is tolerating the change w/o incident.

## 2020-10-22 NOTE — PLAN
[FreeTextEntry1] : Patient now in normal sinus rhythm on atenolol 25 mg twice daily.  Patient is tolerating the increase, and blood pressure is improved.\par Patient is encouraged to follow-up with his cardiologist for further evaluation and routine follow-up of cardiac status.\par Patient is also strongly advised to follow-up with his urologist.  We have made multiple appointments for him which he has not kept.  We discussed again the importance of evaluating the elevation in prostate hormone and the fact that it may be related to a prostate cancer.\par A urology appointment and a cardiology appointment was made for the patient.\par Follow-up 2 months.

## 2020-10-22 NOTE — PHYSICAL EXAM
[No Acute Distress] : no acute distress [Normal Sclera/Conjunctiva] : normal sclera/conjunctiva [Normal Oropharynx] : the oropharynx was normal [No Lymphadenopathy] : no lymphadenopathy [No Respiratory Distress] : no respiratory distress  [Clear to Auscultation] : lungs were clear to auscultation bilaterally [Normal Rate] : normal [Rhythm Regular] : regular [Normal S1] : normal S1 [Normal S2] : normal S2 [II] : a grade 2 [No Edema] : there was no peripheral edema [No Focal Deficits] : no focal deficits [Normal] : affect was normal and insight and judgment were intact [de-identified] : plaque rash mildly erythematous with white scales noted on the scalp. Erythematous plaque left inguinal

## 2020-11-02 ENCOUNTER — APPOINTMENT (OUTPATIENT)
Dept: CARDIOLOGY | Facility: CLINIC | Age: 65
End: 2020-11-02

## 2020-11-04 ENCOUNTER — APPOINTMENT (OUTPATIENT)
Dept: UROLOGY | Facility: CLINIC | Age: 65
End: 2020-11-04

## 2020-11-04 NOTE — ASSESSMENT
[FreeTextEntry1] : #1) elevated PSA\par 2/12/20 PSA 8.47\par Repeat PSA next week.\par RTO 2 weeks to discuss the renal PSA and the possibility of prostate biopsy.\par \par #2) BPH

## 2020-11-04 NOTE — LETTER BODY
[Dear  ___] : Dear ~MIRZA, [Consult Letter:] : I had the pleasure of evaluating your patient, [unfilled]. [( Thank you for referring [unfilled] for consultation for _____ )] : Thank you for referring [unfilled] for consultation for [unfilled] [Please see my note below.] : Please see my note below. [Consult Closing:] : Thank you very much for allowing me to participate in the care of this patient.  If you have any questions, please do not hesitate to contact me. [Sincerely,] : Sincerely,

## 2020-11-04 NOTE — PHYSICAL EXAM
[General Appearance - Well Developed] : well developed [General Appearance - Well Nourished] : well nourished [Normal Appearance] : normal appearance [Well Groomed] : well groomed [General Appearance - In No Acute Distress] : no acute distress [Edema] : no peripheral edema [Respiration, Rhythm And Depth] : normal respiratory rhythm and effort [Exaggerated Use Of Accessory Muscles For Inspiration] : no accessory muscle use [Auscultation Breath Sounds / Voice Sounds] : lungs were clear to auscultation bilaterally [Bowel Sounds] : normal bowel sounds [Abdomen Soft] : soft [Abdomen Tenderness] : non-tender [Abdomen Mass (___ Cm)] : no abdominal mass palpated [Costovertebral Angle Tenderness] : no ~M costovertebral angle tenderness [Urethral Meatus] : meatus normal [Penis Abnormality] : normal circumcised penis [Urinary Bladder Findings] : the bladder was normal on palpation [Scrotum] : the scrotum was normal [Epididymis] : the epididymides were normal [Testes Tenderness] : no tenderness of the testes [Testes Mass (___cm)] : there were no testicular masses [Anus Abnormality] : the anus and perineum were normal [Rectal Exam - Rectum] : digital rectal exam was normal [Prostate Tenderness] : the prostate was not tender [No Prostate Nodules] : no prostate nodules [Prostate Size ___ gm] : prostate size [unfilled] gm [Normal Station and Gait] : the gait and station were normal for the patient's age [] : no rash [No Focal Deficits] : no focal deficits [Oriented To Time, Place, And Person] : oriented to person, place, and time [Affect] : the affect was normal [Mood] : the mood was normal [Not Anxious] : not anxious [No Palpable Adenopathy] : no palpable adenopathy [FreeTextEntry1] : S1-S2 normal without murmur rub or gallop

## 2020-11-23 ENCOUNTER — APPOINTMENT (OUTPATIENT)
Dept: CARDIOLOGY | Facility: CLINIC | Age: 65
End: 2020-11-23

## 2020-12-23 ENCOUNTER — APPOINTMENT (OUTPATIENT)
Dept: FAMILY MEDICINE | Facility: CLINIC | Age: 65
End: 2020-12-23
Payer: MEDICARE

## 2020-12-23 VITALS
DIASTOLIC BLOOD PRESSURE: 80 MMHG | SYSTOLIC BLOOD PRESSURE: 130 MMHG | TEMPERATURE: 97.2 F | HEART RATE: 87 BPM | HEIGHT: 66 IN | WEIGHT: 193 LBS | OXYGEN SATURATION: 96 % | BODY MASS INDEX: 31.02 KG/M2

## 2020-12-23 PROCEDURE — 36415 COLL VENOUS BLD VENIPUNCTURE: CPT

## 2020-12-23 PROCEDURE — 99214 OFFICE O/P EST MOD 30 MIN: CPT | Mod: 25

## 2020-12-23 PROCEDURE — 99072 ADDL SUPL MATRL&STAF TM PHE: CPT

## 2020-12-23 RX ORDER — NYSTATIN 100000 [USP'U]/G
100000 CREAM TOPICAL
Qty: 30 | Refills: 0 | Status: DISCONTINUED | COMMUNITY
Start: 2020-09-07 | End: 2020-12-23

## 2020-12-24 NOTE — PLAN
[FreeTextEntry1] : Labs drawn in office.\par F/U 3 months.\par Cardiology and Urology appts expedited for pt.

## 2020-12-24 NOTE — PHYSICAL EXAM
[No Acute Distress] : no acute distress [Normal Sclera/Conjunctiva] : normal sclera/conjunctiva [No Lymphadenopathy] : no lymphadenopathy [No Respiratory Distress] : no respiratory distress  [Clear to Auscultation] : lungs were clear to auscultation bilaterally [Normal Rate] : normal [Rhythm Regular] : regular [Normal S1] : normal S1 [Normal S2] : normal S2 [II] : a grade 2 [No Focal Deficits] : no focal deficits [Normal] : affect was normal and insight and judgment were intact [de-identified] : trace edema b/l [de-identified] : plaque rash mildly erythematous with white scales noted on the scalp. Erythematous plaque left inguinal

## 2020-12-24 NOTE — HISTORY OF PRESENT ILLNESS
[FreeTextEntry1] : Pt here to follow upon htn, prediabetes and from urologist visit due to increase in PSA\par missed 2nd urology f/u visit. He was also advised to see his cardiologist for evaluation of elevated fasting HR, but he did not go.\par He is putting on wt.

## 2020-12-26 NOTE — REVIEW OF SYSTEMS
Called patient to follow-up on symptoms. Per patient, abdominal pain and lightheadedness is gone. Patient stated she started lisinopril-HCTZ on Monday and was not sure is she ate something that upset her stomach that day.    Patient states she held the [Itching] : itching [Nocturia] : nocturia [Skin Rash] : skin rash [Negative] : Psychiatric

## 2020-12-28 LAB
ANION GAP SERPL CALC-SCNC: 12 MMOL/L
BUN SERPL-MCNC: 23 MG/DL
CALCIUM SERPL-MCNC: 9.5 MG/DL
CHLORIDE SERPL-SCNC: 102 MMOL/L
CO2 SERPL-SCNC: 21 MMOL/L
CREAT SERPL-MCNC: 0.81 MG/DL
ESTIMATED AVERAGE GLUCOSE: 114 MG/DL
GLUCOSE SERPL-MCNC: 99 MG/DL
HBA1C MFR BLD HPLC: 5.6 %
POTASSIUM SERPL-SCNC: 4.5 MMOL/L
PSA SERPL-MCNC: 5.03 NG/ML
SODIUM SERPL-SCNC: 136 MMOL/L

## 2021-03-24 ENCOUNTER — APPOINTMENT (OUTPATIENT)
Dept: FAMILY MEDICINE | Facility: CLINIC | Age: 66
End: 2021-03-24
Payer: MEDICARE

## 2021-03-24 VITALS
HEART RATE: 79 BPM | BODY MASS INDEX: 32.62 KG/M2 | OXYGEN SATURATION: 96 % | TEMPERATURE: 98.4 F | DIASTOLIC BLOOD PRESSURE: 82 MMHG | HEIGHT: 66 IN | SYSTOLIC BLOOD PRESSURE: 136 MMHG | WEIGHT: 203 LBS

## 2021-03-24 DIAGNOSIS — D72.829 ELEVATED WHITE BLOOD CELL COUNT, UNSPECIFIED: ICD-10-CM

## 2021-03-24 DIAGNOSIS — E55.9 VITAMIN D DEFICIENCY, UNSPECIFIED: ICD-10-CM

## 2021-03-24 PROCEDURE — 99214 OFFICE O/P EST MOD 30 MIN: CPT | Mod: 25

## 2021-03-24 PROCEDURE — 36415 COLL VENOUS BLD VENIPUNCTURE: CPT

## 2021-03-24 PROCEDURE — 99072 ADDL SUPL MATRL&STAF TM PHE: CPT

## 2021-03-24 NOTE — PLAN
[FreeTextEntry1] : Valsartan HCT increased from 80-12.5 mg/d to 160-12.5 mg/d.\par Dietary modification reviewed with patient.  Impressed the importance of losing weight.\par F/U with cardio to reassess aortic root dilatation and SVT.\par D/W pt that elevated PSA needs to be evaluated and to f/u with urology.\par All instructions were written for patient.\par Follow-up 4 to 6 weeks.\par

## 2021-03-24 NOTE — PHYSICAL EXAM
[Normal Sclera/Conjunctiva] : normal sclera/conjunctiva [No Lymphadenopathy] : no lymphadenopathy [No Respiratory Distress] : no respiratory distress  [Clear to Auscultation] : lungs were clear to auscultation bilaterally [Normal Rate] : normal [Rhythm Regular] : regular [Normal S1] : normal S1 [Normal S2] : normal S2 [II] : a grade 2 [No Focal Deficits] : no focal deficits [Normal] : affect was normal and insight and judgment were intact [de-identified] : trace edema b/l [de-identified] : plaque rash mildly erythematous with white scales noted on the scalp. Erythematous plaque left inguinal

## 2021-03-24 NOTE — HISTORY OF PRESENT ILLNESS
[FreeTextEntry1] : Pt is here to follow up on hypertension. \par Pt states he still has not seen the cardiologist or urologist\par Pt put on 10 lbs over the past 3 months.\par He admits that he has not made the best food choices. [de-identified] : 10/22/21:\par Pt here to follow up on HTN.\par Patient had a resting tachycardia with heart rate 120 bpm 3 weeks ago as well as a mildly elevated blood pressure.  Atenolol was increased from 1 tab once daily to 1 tab twice daily.  He is here to follow-up since his change in medication.\par He is tolerating the change w/o incident.

## 2021-03-25 LAB
25(OH)D3 SERPL-MCNC: 21.4 NG/ML
ANION GAP SERPL CALC-SCNC: 8 MMOL/L
BASOPHILS # BLD AUTO: 0.02 K/UL
BASOPHILS NFR BLD AUTO: 0.2 %
BUN SERPL-MCNC: 18 MG/DL
CALCIUM SERPL-MCNC: 9 MG/DL
CHLORIDE SERPL-SCNC: 102 MMOL/L
CO2 SERPL-SCNC: 26 MMOL/L
CREAT SERPL-MCNC: 0.74 MG/DL
EOSINOPHIL # BLD AUTO: 0.2 K/UL
EOSINOPHIL NFR BLD AUTO: 2 %
ESTIMATED AVERAGE GLUCOSE: 120 MG/DL
GLUCOSE SERPL-MCNC: 97 MG/DL
HBA1C MFR BLD HPLC: 5.8 %
HCT VFR BLD CALC: 45.1 %
HGB BLD-MCNC: 14.7 G/DL
IMM GRANULOCYTES NFR BLD AUTO: 0.6 %
LYMPHOCYTES # BLD AUTO: 1.22 K/UL
LYMPHOCYTES NFR BLD AUTO: 12.3 %
MAN DIFF?: NORMAL
MCHC RBC-ENTMCNC: 30.1 PG
MCHC RBC-ENTMCNC: 32.6 GM/DL
MCV RBC AUTO: 92.2 FL
MONOCYTES # BLD AUTO: 0.87 K/UL
MONOCYTES NFR BLD AUTO: 8.7 %
NEUTROPHILS # BLD AUTO: 7.58 K/UL
NEUTROPHILS NFR BLD AUTO: 76.2 %
PLATELET # BLD AUTO: 263 K/UL
POTASSIUM SERPL-SCNC: 5.1 MMOL/L
RBC # BLD: 4.89 M/UL
RBC # FLD: 13.6 %
SODIUM SERPL-SCNC: 135 MMOL/L
TSH SERPL-ACNC: 2.52 UIU/ML
WBC # FLD AUTO: 9.95 K/UL

## 2021-06-18 ENCOUNTER — APPOINTMENT (OUTPATIENT)
Dept: FAMILY MEDICINE | Facility: CLINIC | Age: 66
End: 2021-06-18
Payer: MEDICARE

## 2021-06-18 ENCOUNTER — NON-APPOINTMENT (OUTPATIENT)
Age: 66
End: 2021-06-18

## 2021-06-18 VITALS
WEIGHT: 207 LBS | HEIGHT: 66 IN | BODY MASS INDEX: 33.27 KG/M2 | SYSTOLIC BLOOD PRESSURE: 138 MMHG | OXYGEN SATURATION: 98 % | DIASTOLIC BLOOD PRESSURE: 86 MMHG | HEART RATE: 88 BPM

## 2021-06-18 DIAGNOSIS — I51.9 HEART DISEASE, UNSPECIFIED: ICD-10-CM

## 2021-06-18 DIAGNOSIS — Z92.29 PERSONAL HISTORY OF OTHER DRUG THERAPY: ICD-10-CM

## 2021-06-18 PROCEDURE — 93000 ELECTROCARDIOGRAM COMPLETE: CPT

## 2021-06-18 PROCEDURE — 99072 ADDL SUPL MATRL&STAF TM PHE: CPT

## 2021-06-18 PROCEDURE — 36415 COLL VENOUS BLD VENIPUNCTURE: CPT

## 2021-06-18 PROCEDURE — 99214 OFFICE O/P EST MOD 30 MIN: CPT | Mod: 25

## 2021-06-18 NOTE — HISTORY OF PRESENT ILLNESS
[FreeTextEntry1] : Pt is following up on HTN and hyperlipidema\par Valsartan HCT increased from 80-12.5 mg/d to 160-12.5 mg/d 3 months ago.\par Gained 26 lbs over the past 8 months.\par He has not gone to see the urologist or the cardiologist as advised.

## 2021-06-18 NOTE — ASSESSMENT
[FreeTextEntry1] : Urology  to facilitate appt and will call pt.\par Appt expedited for cardiology for routine f/u.\par Valsartan -HCT  increased to 320-12.5 mg/d, from 160-12.5.\par Fasting bw ordered.

## 2021-06-18 NOTE — PHYSICAL EXAM
[Normal Sclera/Conjunctiva] : normal sclera/conjunctiva [No Lymphadenopathy] : no lymphadenopathy [No Respiratory Distress] : no respiratory distress  [Clear to Auscultation] : lungs were clear to auscultation bilaterally [Normal Rate] : normal [Rhythm Regular] : regular [Normal S1] : normal S1 [Normal S2] : normal S2 [II] : a grade 2 [No Focal Deficits] : no focal deficits [Normal] : affect was normal and insight and judgment were intact [de-identified] : trace edema b/l

## 2021-09-10 ENCOUNTER — APPOINTMENT (OUTPATIENT)
Dept: FAMILY MEDICINE | Facility: CLINIC | Age: 66
End: 2021-09-10

## 2021-09-22 ENCOUNTER — APPOINTMENT (OUTPATIENT)
Dept: FAMILY MEDICINE | Facility: CLINIC | Age: 66
End: 2021-09-22
Payer: MEDICARE

## 2021-09-22 VITALS
DIASTOLIC BLOOD PRESSURE: 78 MMHG | WEIGHT: 214 LBS | HEIGHT: 66 IN | SYSTOLIC BLOOD PRESSURE: 130 MMHG | TEMPERATURE: 98.1 F | OXYGEN SATURATION: 97 % | HEART RATE: 70 BPM | BODY MASS INDEX: 34.39 KG/M2

## 2021-09-22 DIAGNOSIS — F79 UNSPECIFIED INTELLECTUAL DISABILITIES: ICD-10-CM

## 2021-09-22 DIAGNOSIS — R01.1 CARDIAC MURMUR, UNSPECIFIED: ICD-10-CM

## 2021-09-22 DIAGNOSIS — R63.5 ABNORMAL WEIGHT GAIN: ICD-10-CM

## 2021-09-22 PROCEDURE — 99214 OFFICE O/P EST MOD 30 MIN: CPT | Mod: 25

## 2021-09-22 PROCEDURE — 36415 COLL VENOUS BLD VENIPUNCTURE: CPT

## 2021-09-22 PROCEDURE — G0008: CPT

## 2021-09-22 PROCEDURE — 90662 IIV NO PRSV INCREASED AG IM: CPT

## 2021-09-22 NOTE — PHYSICAL EXAM
[Normal Sclera/Conjunctiva] : normal sclera/conjunctiva [No Lymphadenopathy] : no lymphadenopathy [No Respiratory Distress] : no respiratory distress  [Clear to Auscultation] : lungs were clear to auscultation bilaterally [Normal Rate] : normal [Rhythm Regular] : regular [Normal S1] : normal S1 [Normal S2] : normal S2 [II] : a grade 2 [Obese] : obese [Soft, Nontender] : the abdomen was soft and nontender [No Focal Deficits] : no focal deficits [Normal] : affect was normal and insight and judgment were intact [de-identified] : trace edema b/l

## 2021-09-22 NOTE — PLAN
[FreeTextEntry1] : Moderate complexity decision making based on pt's multiple chronic medical conditions and polypharmacy.\par

## 2021-09-22 NOTE — ASSESSMENT
[FreeTextEntry1] : The pt was counseled on the risks and benefits of influenza vaccination. Side effects were reviewed with the patient, including risk for redness or soreness at the site of the injection, fever, aches, itching, headaches and fatigue.\par D/w pt importance of f/u with Urology and Cardiology.\par Will initiate Care Management consult to help facilitate f/u with medical specialists and evaluate for supportive services for independent living.\par Labs drawn in office.\par Recommendations written for pt.

## 2021-09-22 NOTE — HISTORY OF PRESENT ILLNESS
[FreeTextEntry1] : Patient is following up on htn and hyperlipidemia.\par Pt has gained 34 lbs in one year.\par He has not followed up with Urology for evaluation of elevated PSA as advised. \par Has not had f/u with cardiology as advised.\par Feels well.

## 2021-09-28 LAB
ALBUMIN SERPL ELPH-MCNC: 4.1 G/DL
ALP BLD-CCNC: 107 U/L
ALT SERPL-CCNC: 20 U/L
ANION GAP SERPL CALC-SCNC: 14 MMOL/L
AST SERPL-CCNC: 10 U/L
BASOPHILS # BLD AUTO: 0.04 K/UL
BASOPHILS NFR BLD AUTO: 0.4 %
BILIRUB SERPL-MCNC: 0.5 MG/DL
BUN SERPL-MCNC: 16 MG/DL
CALCIUM SERPL-MCNC: 9.2 MG/DL
CHLORIDE SERPL-SCNC: 100 MMOL/L
CHOLEST SERPL-MCNC: 153 MG/DL
CO2 SERPL-SCNC: 23 MMOL/L
CREAT SERPL-MCNC: 0.76 MG/DL
EOSINOPHIL # BLD AUTO: 0.32 K/UL
EOSINOPHIL NFR BLD AUTO: 3.3 %
ESTIMATED AVERAGE GLUCOSE: 120 MG/DL
GLUCOSE SERPL-MCNC: 101 MG/DL
HBA1C MFR BLD HPLC: 5.8 %
HCT VFR BLD CALC: 47.4 %
HDLC SERPL-MCNC: 36 MG/DL
HGB BLD-MCNC: 15 G/DL
IMM GRANULOCYTES NFR BLD AUTO: 0.6 %
LDLC SERPL CALC-MCNC: 99 MG/DL
LYMPHOCYTES # BLD AUTO: 1.45 K/UL
LYMPHOCYTES NFR BLD AUTO: 15.1 %
MAN DIFF?: NORMAL
MCHC RBC-ENTMCNC: 29.6 PG
MCHC RBC-ENTMCNC: 31.6 GM/DL
MCV RBC AUTO: 93.5 FL
MONOCYTES # BLD AUTO: 0.86 K/UL
MONOCYTES NFR BLD AUTO: 8.9 %
NEUTROPHILS # BLD AUTO: 6.9 K/UL
NEUTROPHILS NFR BLD AUTO: 71.7 %
NONHDLC SERPL-MCNC: 117 MG/DL
PLATELET # BLD AUTO: 250 K/UL
POTASSIUM SERPL-SCNC: 4.9 MMOL/L
PROT SERPL-MCNC: 6.3 G/DL
PSA SERPL-MCNC: 3.62 NG/ML
RBC # BLD: 5.07 M/UL
RBC # FLD: 13.9 %
SODIUM SERPL-SCNC: 136 MMOL/L
TRIGL SERPL-MCNC: 91 MG/DL
TSH SERPL-ACNC: 2.35 UIU/ML
WBC # FLD AUTO: 9.63 K/UL

## 2021-12-01 ENCOUNTER — APPOINTMENT (OUTPATIENT)
Dept: FAMILY MEDICINE | Facility: CLINIC | Age: 66
End: 2021-12-01
Payer: MEDICARE

## 2021-12-01 VITALS
OXYGEN SATURATION: 97 % | TEMPERATURE: 97.7 F | HEART RATE: 82 BPM | BODY MASS INDEX: 35.36 KG/M2 | SYSTOLIC BLOOD PRESSURE: 130 MMHG | WEIGHT: 220 LBS | HEIGHT: 66 IN | RESPIRATION RATE: 16 BRPM | DIASTOLIC BLOOD PRESSURE: 78 MMHG

## 2021-12-01 DIAGNOSIS — R60.0 LOCALIZED EDEMA: ICD-10-CM

## 2021-12-01 PROCEDURE — 99214 OFFICE O/P EST MOD 30 MIN: CPT

## 2021-12-01 RX ORDER — CALCIPOTRIENE 0.05 MG/ML
0.01 SOLUTION TOPICAL DAILY
Qty: 1 | Refills: 0 | Status: DISCONTINUED | COMMUNITY
Start: 2020-06-10 | End: 2021-12-01

## 2021-12-01 NOTE — HISTORY OF PRESENT ILLNESS
[FreeTextEntry1] : Pt is here for HTN.\par No c/o\par He continues to gain wt at a rapid pace. Gained 40 lbs over the past one month

## 2021-12-01 NOTE — PHYSICAL EXAM
[No Acute Distress] : no acute distress [Normal Sclera/Conjunctiva] : normal sclera/conjunctiva [No JVD] : no jugular venous distention [No Lymphadenopathy] : no lymphadenopathy [Supple] : supple [Normal Rate] : normal [Rhythm Regular] : regular [Normal S1] : normal S1 [Normal S2] : normal S2 [II] : a grade 2 [Normal] : affect was normal and insight and judgment were intact [de-identified] : Pt had an eipsode of urinary incontinence just prior to the encounter.

## 2021-12-01 NOTE — ASSESSMENT
[FreeTextEntry1] : Cont current meds.\par Recommend wt reduction with suport of Obesity Medicine team.\par Pt reminded he needs to see cardio and urology. Contact info provided.\par F/U 3 months.

## 2021-12-21 ENCOUNTER — APPOINTMENT (OUTPATIENT)
Dept: FAMILY MEDICINE | Facility: CLINIC | Age: 66
End: 2021-12-21

## 2021-12-30 ENCOUNTER — APPOINTMENT (OUTPATIENT)
Dept: FAMILY MEDICINE | Facility: CLINIC | Age: 66
End: 2021-12-30

## 2022-01-26 ENCOUNTER — APPOINTMENT (OUTPATIENT)
Dept: FAMILY MEDICINE | Facility: CLINIC | Age: 67
End: 2022-01-26

## 2022-01-27 ENCOUNTER — NON-APPOINTMENT (OUTPATIENT)
Age: 67
End: 2022-01-27

## 2022-01-28 ENCOUNTER — APPOINTMENT (OUTPATIENT)
Dept: FAMILY MEDICINE | Facility: CLINIC | Age: 67
End: 2022-01-28
Payer: MEDICARE

## 2022-01-28 VITALS
WEIGHT: 220.38 LBS | OXYGEN SATURATION: 96 % | SYSTOLIC BLOOD PRESSURE: 132 MMHG | TEMPERATURE: 98.1 F | HEIGHT: 66 IN | HEART RATE: 82 BPM | DIASTOLIC BLOOD PRESSURE: 70 MMHG | BODY MASS INDEX: 35.42 KG/M2

## 2022-01-28 DIAGNOSIS — Z00.00 ENCOUNTER FOR GENERAL ADULT MEDICAL EXAMINATION W/OUT ABNORMAL FINDINGS: ICD-10-CM

## 2022-01-28 DIAGNOSIS — H02.823 CYSTS OF RIGHT EYE, UNSPECIFIED EYELID: ICD-10-CM

## 2022-01-28 PROCEDURE — 99215 OFFICE O/P EST HI 40 MIN: CPT | Mod: 25

## 2022-01-28 PROCEDURE — 36415 COLL VENOUS BLD VENIPUNCTURE: CPT

## 2022-01-28 NOTE — PHYSICAL EXAM
[No Acute Distress] : no acute distress [Normal Sclera/Conjunctiva] : normal sclera/conjunctiva [No JVD] : no jugular venous distention [No Lymphadenopathy] : no lymphadenopathy [Supple] : supple [Normal Rate] : normal [Rhythm Regular] : regular [Normal S1] : normal S1 [Normal S2] : normal S2 [II] : a grade 2 [No Edema] : there was no peripheral edema [Obese] : obese [No Focal Deficits] : no focal deficits [Normal] : affect was normal and insight and judgment were intact [PERRL] : pupils equal round and reactive to light [EOMI] : extraocular movements intact [de-identified] : Small, fleshy, round benign-appearing cyst right upper lateral lid and a second smaller one at the lateral lid/temporal area.  Cyst of the upper right lid does not appear to obstruct lid opening and closure.

## 2022-01-28 NOTE — PLAN
[FreeTextEntry1] : Pt encounter incorporated clinical review of the medical record including consultation from specialists, review of lab and diagnostic testing with interpretation and discussion of results with patient, HHA, and pt's guardian, general pt counseling, and coordination of care, as well as documentation update within the electronic medical record.\par \par Time spent: 55 mins.\par

## 2022-01-28 NOTE — HISTORY OF PRESENT ILLNESS
[FreeTextEntry1] : Patient is following up on hypertension.\par He has been noted to have elevated blood pressures at home with blood pressure readings as high as 162/89. Medication adherence is questionable.\par Pt also several episodes of urinary incontinence. No dysuria. No change in balance.\par He has been noted to have increased anxiety over the past few months.\par Patient states he feels well.\par His weight is stable compared to 1 month ago. Patient's home health aide, Madison, is present with Catracho today. She states that his diet consists of increased intake of meat and cheese with regular intake of fast food which usually consists of a burger and French fries or pizza. He also drinks diet soda regularly.\par See note from 1/27/22. [de-identified] : Chart note 1/27/22:\panchito Spoke with Home Care Assistance director Rachell [phone] regarding patient Catracho Licea.  Catracho lives independently with support from the Home [employer] company with home health aides that visit 7 days a week and stay with him 8 AM to 8 PM.  His legal guardian is Rajiv Castillo [phone].\par Rachell has seen Catracho recently and noted that he had gained weight and appeared mildly short of breath with exertion.  In addition his blood pressure is elevated and was 162/89 today with pulse 96 bpm.  She is not sure if the patient is taking his medications regularly as prescribed. In addition, he has had several incidences of urinary incontinence over the past month.  She also notes that he is more anxious lately.  Rachell is wondering if Catracho would benefit from home PT and also benefit from a consultation from his cardiologist.\par I discussed with Rachell the fact that Catracho has put on a lot of weight in the past 3 months, with a weight gain of almost 40 pounds over the past year.  There has been some difficulty with compliance with recommendations regarding prior advice for Catracho to see his cardiologist as well as recommendations for him to follow-up with a urologist regarding his elevated PSA level.  We have also had multiple discussions regarding his rapid weight gain and need for dietary modification.\par Catracho is coming in tomorrow for an office visit and we will re-address these issues and include his legal guardian and his home care agency in moving forward to help Catracho with adherence to his recommended treatment plan.

## 2022-01-28 NOTE — ASSESSMENT
[FreeTextEntry1] : Spoke with Rajivkamini Castillo, patient's guardian regarding need for cardiology follow-up in view of increasing blood pressure and also for surveillance of aortic root dilatation.\par Patient also in need of nutritional counseling for structured meal plan to help him lose weight.  We discussed the fact that he is prediabetic and weight loss and increased exercise is essential.\par I will call Rajiv back once I am able to secure nutritional counseling for Catracho.\par We further discussed the need to follow-up with urology relating to his increase in PSA.\par In addition ophthalmology consultation for routine eye examination, glaucoma screening and for discussion regarding possible removal of the benign cyst of the right upper lid and the right lateral lid/temporal area.\par Follow-up 6 to 8 weeks to assess therapeutic effect of the Escitalopram and to monitor blood pressure on new valsartan dose, which has been increased from valsartan 320 mg / 12.5 mg 1 tab per day to valsartan 320 mg - 25 mg 1 tab per day.

## 2022-02-01 LAB
ANION GAP SERPL CALC-SCNC: 17 MMOL/L
BUN SERPL-MCNC: 21 MG/DL
CALCIUM SERPL-MCNC: 9.3 MG/DL
CHLORIDE SERPL-SCNC: 101 MMOL/L
CO2 SERPL-SCNC: 20 MMOL/L
CREAT SERPL-MCNC: 0.79 MG/DL
ESTIMATED AVERAGE GLUCOSE: 126 MG/DL
GLUCOSE SERPL-MCNC: 103 MG/DL
HBA1C MFR BLD HPLC: 6 %
POTASSIUM SERPL-SCNC: 4.8 MMOL/L
SODIUM SERPL-SCNC: 138 MMOL/L

## 2022-02-11 ENCOUNTER — APPOINTMENT (OUTPATIENT)
Dept: UROLOGY | Facility: CLINIC | Age: 67
End: 2022-02-11
Payer: MEDICARE

## 2022-02-11 VITALS
HEIGHT: 64 IN | BODY MASS INDEX: 37.9 KG/M2 | HEART RATE: 97 BPM | SYSTOLIC BLOOD PRESSURE: 131 MMHG | WEIGHT: 222 LBS | DIASTOLIC BLOOD PRESSURE: 79 MMHG

## 2022-02-11 DIAGNOSIS — R32 UNSPECIFIED URINARY INCONTINENCE: ICD-10-CM

## 2022-02-11 PROCEDURE — 99203 OFFICE O/P NEW LOW 30 MIN: CPT

## 2022-02-11 NOTE — REVIEW OF SYSTEMS
[Recent Weight Gain (___ Lbs)] : recent [unfilled] ~Ulb weight gain [Incontinence] : incontinence [Nocturia] : nocturia [Itching] : itching

## 2022-02-11 NOTE — HISTORY OF PRESENT ILLNESS
[FreeTextEntry1] : Accompanied by home health aide she is not sure why pt is here today, pt not sure of reason for visit. C/o nocturia 3x/night, does not taper fluids at night.  With incontinence x 1 year, wears adult diapers,  awakens 1-2x/night, sometimes urgency. [Urinary Urgency] : urinary urgency [Nocturia] : nocturia

## 2022-02-11 NOTE — PHYSICAL EXAM
[General Appearance - In No Acute Distress] : no acute distress [] : no respiratory distress [Respiration, Rhythm And Depth] : normal respiratory rhythm and effort [Exaggerated Use Of Accessory Muscles For Inspiration] : no accessory muscle use [Urethral Meatus] : meatus normal [Penis Abnormality] : normal circumcised penis [Scrotum] : the scrotum was normal [Epididymis] : the epididymides were normal [Testes Tenderness] : no tenderness of the testes [Testes Mass (___cm)] : there were no testicular masses [Anus Abnormality] : the anus and perineum were normal [Rectal Exam - Rectum] : digital rectal exam was normal [Prostate Tenderness] : the prostate was not tender [No Prostate Nodules] : no prostate nodules [Skin Color & Pigmentation] : normal skin color and pigmentation [FreeTextEntry1] : anxious, alert responsive

## 2022-02-11 NOTE — ASSESSMENT
[FreeTextEntry1] : BPH, incontinence, nocturia\par \par Rx: Tamsulosin, instructed \par PSA 3.62 9/2021, 12/23 PSA 5.03, PSA 2/12/2020 8.47\par Unable to provide urine in office\par Will send for prostate MRI\par RTO 2-3 weeks for results\par \par \par

## 2022-02-14 PROBLEM — R32 URINE INCONTINENCE: Status: ACTIVE | Noted: 2022-01-28

## 2022-02-22 ENCOUNTER — RESULT REVIEW (OUTPATIENT)
Age: 67
End: 2022-02-22

## 2022-02-22 ENCOUNTER — APPOINTMENT (OUTPATIENT)
Dept: MRI IMAGING | Facility: CLINIC | Age: 67
End: 2022-02-22
Payer: MEDICARE

## 2022-02-22 ENCOUNTER — OUTPATIENT (OUTPATIENT)
Dept: OUTPATIENT SERVICES | Facility: HOSPITAL | Age: 67
LOS: 1 days | End: 2022-02-22
Payer: MEDICARE

## 2022-02-22 DIAGNOSIS — Z00.00 ENCOUNTER FOR GENERAL ADULT MEDICAL EXAMINATION WITHOUT ABNORMAL FINDINGS: ICD-10-CM

## 2022-02-22 DIAGNOSIS — R97.20 ELEVATED PROSTATE SPECIFIC ANTIGEN [PSA]: ICD-10-CM

## 2022-02-22 PROCEDURE — 72197 MRI PELVIS W/O & W/DYE: CPT | Mod: MG

## 2022-02-22 PROCEDURE — 76377 3D RENDER W/INTRP POSTPROCES: CPT

## 2022-02-22 PROCEDURE — G1004: CPT

## 2022-02-22 PROCEDURE — A9585: CPT

## 2022-02-22 PROCEDURE — 72197 MRI PELVIS W/O & W/DYE: CPT | Mod: 26,MG

## 2022-02-22 PROCEDURE — 76377 3D RENDER W/INTRP POSTPROCES: CPT | Mod: 26

## 2022-02-25 ENCOUNTER — APPOINTMENT (OUTPATIENT)
Dept: UROLOGY | Facility: CLINIC | Age: 67
End: 2022-02-25
Payer: MEDICARE

## 2022-02-25 PROCEDURE — 99213 OFFICE O/P EST LOW 20 MIN: CPT

## 2022-02-28 ENCOUNTER — APPOINTMENT (OUTPATIENT)
Dept: UROLOGY | Facility: CLINIC | Age: 67
End: 2022-02-28

## 2022-02-28 NOTE — HISTORY OF PRESENT ILLNESS
[FreeTextEntry1] : Had MRI [Urinary Incontinence] : urinary incontinence [Urinary Urgency] : urinary urgency [Nocturia] : nocturia [None] : None

## 2022-02-28 NOTE — PHYSICAL EXAM
[General Appearance - Well Developed] : well developed [General Appearance - Well Nourished] : well nourished [General Appearance - In No Acute Distress] : no acute distress [] : no respiratory distress [Mood] : the mood was normal

## 2022-03-15 ENCOUNTER — NON-APPOINTMENT (OUTPATIENT)
Age: 67
End: 2022-03-15

## 2022-03-15 ENCOUNTER — APPOINTMENT (OUTPATIENT)
Dept: CARDIOLOGY | Facility: CLINIC | Age: 67
End: 2022-03-15
Payer: MEDICARE

## 2022-03-15 VITALS — SYSTOLIC BLOOD PRESSURE: 118 MMHG | DIASTOLIC BLOOD PRESSURE: 60 MMHG

## 2022-03-15 VITALS
WEIGHT: 220 LBS | DIASTOLIC BLOOD PRESSURE: 64 MMHG | RESPIRATION RATE: 16 BRPM | TEMPERATURE: 98.3 F | OXYGEN SATURATION: 94 % | SYSTOLIC BLOOD PRESSURE: 124 MMHG | BODY MASS INDEX: 37.56 KG/M2 | HEART RATE: 81 BPM | HEIGHT: 64 IN

## 2022-03-15 DIAGNOSIS — I51.7 CARDIOMEGALY: ICD-10-CM

## 2022-03-15 DIAGNOSIS — Z78.9 OTHER SPECIFIED HEALTH STATUS: ICD-10-CM

## 2022-03-15 DIAGNOSIS — R25.1 TREMOR, UNSPECIFIED: ICD-10-CM

## 2022-03-15 PROCEDURE — 93000 ELECTROCARDIOGRAM COMPLETE: CPT

## 2022-03-15 PROCEDURE — 99215 OFFICE O/P EST HI 40 MIN: CPT

## 2022-03-16 NOTE — HISTORY OF PRESENT ILLNESS
[FreeTextEntry1] : aortic dilation, hypertension , family history of aortic aneurym , \par \par This is a 66 year old male with history of obesity, and pre diabetes, hypertension and dyslipidemia , generalised anxiety, BPH, aortic dilation here for follow up for aortic dilation.\par \par he denies anyc ehst pain. no dyspnea. no dizziness.  he uses a walker because he has trouble with balance. \par he has not seen a neurologist.    \par No leg edemma.  no .syncope. no opalpitaitons. \par he also has history of fall.  no syncope.  a

## 2022-03-16 NOTE — REASON FOR VISIT
[Symptom and Test Evaluation] : symptom and test evaluation [Arrhythmia/ECG Abnorrmalities] : arrhythmia/ECG abnormalities [FreeTextEntry1] : aortic dilation, hypertension , family history of aortic aneurym ,

## 2022-03-16 NOTE — PHYSICAL EXAM
[Well Developed] : well developed [Well Nourished] : well nourished [No Acute Distress] : no acute distress [Normal Conjunctiva] : normal conjunctiva [Normal Venous Pressure] : normal venous pressure [No Carotid Bruit] : no carotid bruit [Normal S1, S2] : normal S1, S2 [No Murmur] : no murmur [No Rub] : no rub [No Gallop] : no gallop [Clear Lung Fields] : clear lung fields [Good Air Entry] : good air entry [No Respiratory Distress] : no respiratory distress  [Soft] : abdomen soft [Non Tender] : non-tender [No Masses/organomegaly] : no masses/organomegaly [Normal Bowel Sounds] : normal bowel sounds [No Edema] : no edema [No Cyanosis] : no cyanosis [No Clubbing] : no clubbing [No Varicosities] : no varicosities [No Rash] : no rash [No Skin Lesions] : no skin lesions [Moves all extremities] : moves all extremities [Normal Speech] : normal speech [Alert and Oriented] : alert and oriented [Normal memory] : normal memory [de-identified] : tremors.  [de-identified] : tremors.  [de-identified] : anxiety.

## 2022-03-16 NOTE — DISCUSSION/SUMMARY
[Patient] : the patient [Risks] : risks [Benefits] : benefits [Alternatives] : alternatives [With Me] : with me [___ Month(s)] : in [unfilled] month(s) [FreeTextEntry1] : This is a 66 year old male with history of obesity, and pre diabetes, hypertension and dyslipidemia , generalised anxiety, BPH, aortic dilation here for follow up for aortic dilation.\par \par \par 1) aortic dilation:  US aorta to evalute for AAA.  transthoracic echocardiogram .   will evaluate the thoracic aortc with cardiac cta.   \par family history of aortic aneurysm and repair at age 82. aggressive folow up. no smoking. \par ct beta blocker . and BP control. heart rate and BP control.  \par metoprolol 2 tablets the night before CT scan and 3 tablets the am of the CT scan. \par day of CT scan : no valsartan and HCTZ and no Atenolol the day of the ct scan. \par 2) coronary artery disease evaluation: (HTN and HLD and pre diabetes)  cardiac cta. will evaluate aorta and cornoary arteries at the same time\par 3) tremors and gain instability: likely essential tremors. evalaute for parkinsonism.  neruology evalns. already on meds for anxiety. psych evaln if symptoms not controlled. \par 4_) hypertension : ct current meds. it is controlled. \par 5) dyslipidemia : ct statins. lipids at goal. \par Will order and review ECG for the above mentioned diagnosis/condition/symptoms  az

## 2022-04-06 ENCOUNTER — APPOINTMENT (OUTPATIENT)
Dept: CARDIOLOGY | Facility: CLINIC | Age: 67
End: 2022-04-06
Payer: MEDICARE

## 2022-04-06 PROCEDURE — 93306 TTE W/DOPPLER COMPLETE: CPT

## 2022-04-06 PROCEDURE — 93978 VASCULAR STUDY: CPT

## 2022-04-06 RX ORDER — PERFLUTREN 6.52 MG/ML
6.52 INJECTION, SUSPENSION INTRAVENOUS
Qty: 2 | Refills: 0 | Status: COMPLETED | OUTPATIENT
Start: 2022-04-06

## 2022-04-27 ENCOUNTER — APPOINTMENT (OUTPATIENT)
Dept: FAMILY MEDICINE | Facility: CLINIC | Age: 67
End: 2022-04-27
Payer: MEDICARE

## 2022-04-27 ENCOUNTER — APPOINTMENT (OUTPATIENT)
Dept: BARIATRICS/WEIGHT MGMT | Facility: CLINIC | Age: 67
End: 2022-04-27

## 2022-04-27 VITALS
BODY MASS INDEX: 38.76 KG/M2 | HEART RATE: 94 BPM | DIASTOLIC BLOOD PRESSURE: 80 MMHG | WEIGHT: 227 LBS | SYSTOLIC BLOOD PRESSURE: 128 MMHG | OXYGEN SATURATION: 94 % | HEIGHT: 64 IN

## 2022-04-27 DIAGNOSIS — R26.89 OTHER ABNORMALITIES OF GAIT AND MOBILITY: ICD-10-CM

## 2022-04-27 DIAGNOSIS — R97.20 ELEVATED PROSTATE, SPECIFIC ANTIGEN [PSA]: ICD-10-CM

## 2022-04-27 DIAGNOSIS — Z92.29 PERSONAL HISTORY OF OTHER DRUG THERAPY: ICD-10-CM

## 2022-04-27 PROCEDURE — 99214 OFFICE O/P EST MOD 30 MIN: CPT

## 2022-04-27 RX ORDER — METOPROLOL TARTRATE 25 MG/1
25 TABLET, FILM COATED ORAL
Qty: 6 | Refills: 0 | Status: DISCONTINUED | COMMUNITY
Start: 2022-03-15 | End: 2022-04-27

## 2022-04-27 NOTE — HISTORY OF PRESENT ILLNESS
[FreeTextEntry1] : Patient presents to follow-up on his hypertension, and his prediabetes.\par His Home Health Aide, Marry, is present in the office with Catracho today.\par Catracho has no complaints today.\par His HHA has noted increased frequency of emotional outbursts and feelings of aggravation at his home related to tension in relationship with another cohabitant. Pt denies this is problematic for him. He is not taking the Escitalopram regularly, which was started 4 months ago for treatment of his anxiety.\par Valsartan HCT was increased from 320-12.5 mg/day to 320-25 mg/day 4 months ago.  Blood pressure control is improved.\par Patient has had cardiology follow-up since his last visit and had aortic ultrasound as well as echocardiogram. Results stable.\par He has also followed up with his urologist and is being followed for prostatic hyperplasia.\par In addition, the patient is also been to his podiatrist for regular foot care.\par He has a history of prediabetes, and unfortunately has been putting on a lot of weight.\par He has gained 40 lbs over the past 1 1/2 years.\par Catracho is due to meet with a nutritionist.

## 2022-04-27 NOTE — PHYSICAL EXAM
[No Acute Distress] : no acute distress [Normal Sclera/Conjunctiva] : normal sclera/conjunctiva [PERRL] : pupils equal round and reactive to light [EOMI] : extraocular movements intact [No JVD] : no jugular venous distention [No Lymphadenopathy] : no lymphadenopathy [Supple] : supple [Normal] : no respiratory distress, lungs were clear to auscultation bilaterally and no accessory muscle use [Normal Rate] : normal [Rhythm Regular] : regular [Normal S1] : normal S1 [Normal S2] : normal S2 [II] : a grade 2 [No Edema] : there was no peripheral edema [Obese] : obese [No Focal Deficits] : no focal deficits [de-identified] : Small, fleshy, round benign-appearing cyst right upper lateral lid and a second smaller one at the lateral lid/temporal area.  Cyst of the upper right lid does not appear to obstruct lid opening and closure. [de-identified] : Anxious

## 2022-04-27 NOTE — ASSESSMENT
[FreeTextEntry1] : Adherence to low carbohydrate/low-fat diet encouraged.\par Patient advised to follow-up with nutritionist.\par Home sleep study recommended as patient with increased risk factors for obstructive sleep apnea.\par Blood pressure well controlled.\par Escitalopram to be taken once daily.\par Labs drawn in office.\par Follow-up 3 months, f/u sooner if behavioral disturbance persists or any new symptoms arise.

## 2022-04-27 NOTE — PLAN
[FreeTextEntry1] : Pt encounter incorporated clinical review of the medical record including consultation from specialists, review of lab and diagnostic testing with interpretation and discussion of results with patient, general pt counseling, and coordination of care, as well as documentation update within the electronic medical record.\par \par Time spent: 45 mins.\par

## 2022-04-28 ENCOUNTER — RX RENEWAL (OUTPATIENT)
Age: 67
End: 2022-04-28

## 2022-04-28 LAB
ALBUMIN SERPL ELPH-MCNC: 4.1 G/DL
ALP BLD-CCNC: 96 U/L
ALT SERPL-CCNC: 24 U/L
ANION GAP SERPL CALC-SCNC: 11 MMOL/L
AST SERPL-CCNC: 9 U/L
BILIRUB SERPL-MCNC: 0.2 MG/DL
BUN SERPL-MCNC: 30 MG/DL
CALCIUM SERPL-MCNC: 9.5 MG/DL
CHLORIDE SERPL-SCNC: 104 MMOL/L
CO2 SERPL-SCNC: 22 MMOL/L
CREAT SERPL-MCNC: 0.86 MG/DL
EGFR: 95 ML/MIN/1.73M2
ESTIMATED AVERAGE GLUCOSE: 134 MG/DL
GLUCOSE SERPL-MCNC: 91 MG/DL
HBA1C MFR BLD HPLC: 6.3 %
POTASSIUM SERPL-SCNC: 4.7 MMOL/L
PROT SERPL-MCNC: 6.8 G/DL
SODIUM SERPL-SCNC: 138 MMOL/L
TSH SERPL-ACNC: 2.44 UIU/ML

## 2022-05-26 ENCOUNTER — APPOINTMENT (OUTPATIENT)
Dept: FAMILY MEDICINE | Facility: CLINIC | Age: 67
End: 2022-05-26
Payer: MEDICARE

## 2022-05-26 VITALS
WEIGHT: 217 LBS | BODY MASS INDEX: 37.05 KG/M2 | SYSTOLIC BLOOD PRESSURE: 122 MMHG | OXYGEN SATURATION: 96 % | RESPIRATION RATE: 16 BRPM | HEIGHT: 64 IN | DIASTOLIC BLOOD PRESSURE: 65 MMHG | HEART RATE: 92 BPM

## 2022-05-26 PROCEDURE — 99214 OFFICE O/P EST MOD 30 MIN: CPT

## 2022-05-26 RX ORDER — ESCITALOPRAM OXALATE 10 MG/1
10 TABLET ORAL DAILY
Qty: 90 | Refills: 1 | Status: DISCONTINUED | COMMUNITY
Start: 2022-01-28 | End: 2022-05-26

## 2022-05-26 NOTE — HISTORY OF PRESENT ILLNESS
[FreeTextEntry1] : Patient is following up on hypertension, anxiety and obesity\par Health aide notes increased agitation with Escitalopram lately.\par Patient has had guidance for his nutritional intake, modifications were made, and patient has lost 10 pounds over the past 1 month.

## 2022-05-26 NOTE — ASSESSMENT
[FreeTextEntry1] : Change escitalopram to paroxetine due to Tat[place] with the citalopram.\par Continue with diet modification and weight loss efforts.\par Hypertension controlled.\par Consent for Behavioral Health consult obtained.\par F/U 2 months.

## 2022-05-26 NOTE — PHYSICAL EXAM
[No Acute Distress] : no acute distress [Normal Sclera/Conjunctiva] : normal sclera/conjunctiva [PERRL] : pupils equal round and reactive to light [EOMI] : extraocular movements intact [No JVD] : no jugular venous distention [No Lymphadenopathy] : no lymphadenopathy [Supple] : supple [Normal] : no respiratory distress, lungs were clear to auscultation bilaterally and no accessory muscle use [Normal Rate] : normal [Rhythm Regular] : regular [Normal S1] : normal S1 [Normal S2] : normal S2 [II] : a grade 2 [No Edema] : there was no peripheral edema [Obese] : obese [No Focal Deficits] : no focal deficits [de-identified] : Small, fleshy, round benign-appearing cyst right upper lateral lid and a second smaller one at the lateral lid/temporal area.  Cyst of the upper right lid does not appear to obstruct lid opening and closure. [de-identified] : Anxious

## 2022-06-03 ENCOUNTER — TRANSCRIPTION ENCOUNTER (OUTPATIENT)
Age: 67
End: 2022-06-03

## 2022-06-06 RX ORDER — PAROXETINE 25 MG/1
25 TABLET, FILM COATED, EXTENDED RELEASE ORAL DAILY
Qty: 90 | Refills: 0 | Status: DISCONTINUED | COMMUNITY
Start: 2022-05-26 | End: 2022-06-06

## 2022-06-09 RX ORDER — ASPIRIN 81 MG/1
81 TABLET ORAL DAILY
Qty: 90 | Refills: 3 | Status: DISCONTINUED | COMMUNITY
Start: 2018-01-09 | End: 2022-06-09

## 2022-06-20 ENCOUNTER — TRANSCRIPTION ENCOUNTER (OUTPATIENT)
Age: 67
End: 2022-06-20

## 2022-07-05 ENCOUNTER — APPOINTMENT (OUTPATIENT)
Dept: NEUROLOGY | Facility: CLINIC | Age: 67
End: 2022-07-05

## 2022-07-05 ENCOUNTER — APPOINTMENT (OUTPATIENT)
Age: 67
End: 2022-07-05

## 2022-07-05 ENCOUNTER — OUTPATIENT (OUTPATIENT)
Dept: OUTPATIENT SERVICES | Facility: HOSPITAL | Age: 67
LOS: 1 days | End: 2022-07-05
Payer: MEDICARE

## 2022-07-05 DIAGNOSIS — G47.33 OBSTRUCTIVE SLEEP APNEA (ADULT) (PEDIATRIC): ICD-10-CM

## 2022-07-05 PROCEDURE — 95810 POLYSOM 6/> YRS 4/> PARAM: CPT

## 2022-07-05 PROCEDURE — 95810 POLYSOM 6/> YRS 4/> PARAM: CPT | Mod: 26

## 2022-07-07 ENCOUNTER — APPOINTMENT (OUTPATIENT)
Dept: NEUROLOGY | Facility: CLINIC | Age: 67
End: 2022-07-07

## 2022-07-07 VITALS
BODY MASS INDEX: 36.37 KG/M2 | OXYGEN SATURATION: 96 % | SYSTOLIC BLOOD PRESSURE: 119 MMHG | DIASTOLIC BLOOD PRESSURE: 68 MMHG | HEART RATE: 90 BPM | WEIGHT: 213 LBS | TEMPERATURE: 98.9 F | HEIGHT: 64 IN

## 2022-07-07 DIAGNOSIS — Z86.59 PERSONAL HISTORY OF OTHER MENTAL AND BEHAVIORAL DISORDERS: ICD-10-CM

## 2022-07-07 DIAGNOSIS — Z82.49 FAMILY HISTORY OF ISCHEMIC HEART DISEASE AND OTHER DISEASES OF THE CIRCULATORY SYSTEM: ICD-10-CM

## 2022-07-07 DIAGNOSIS — Z86.79 PERSONAL HISTORY OF OTHER DISEASES OF THE CIRCULATORY SYSTEM: ICD-10-CM

## 2022-07-07 DIAGNOSIS — R26.89 OTHER ABNORMALITIES OF GAIT AND MOBILITY: ICD-10-CM

## 2022-07-07 DIAGNOSIS — Z86.39 PERSONAL HISTORY OF OTHER ENDOCRINE, NUTRITIONAL AND METABOLIC DISEASE: ICD-10-CM

## 2022-07-07 PROCEDURE — 99204 OFFICE O/P NEW MOD 45 MIN: CPT

## 2022-07-14 ENCOUNTER — TRANSCRIPTION ENCOUNTER (OUTPATIENT)
Age: 67
End: 2022-07-14

## 2022-07-27 ENCOUNTER — APPOINTMENT (OUTPATIENT)
Dept: FAMILY MEDICINE | Facility: CLINIC | Age: 67
End: 2022-07-27

## 2022-07-28 ENCOUNTER — APPOINTMENT (OUTPATIENT)
Dept: FAMILY MEDICINE | Facility: CLINIC | Age: 67
End: 2022-07-28

## 2022-07-28 VITALS
DIASTOLIC BLOOD PRESSURE: 74 MMHG | HEIGHT: 64 IN | TEMPERATURE: 97.5 F | SYSTOLIC BLOOD PRESSURE: 127 MMHG | BODY MASS INDEX: 36.7 KG/M2 | WEIGHT: 215 LBS | HEART RATE: 88 BPM | OXYGEN SATURATION: 95 % | RESPIRATION RATE: 18 BRPM

## 2022-07-28 DIAGNOSIS — Z91.19 PATIENT'S NONCOMPLIANCE WITH OTHER MEDICAL TREATMENT AND REGIMEN: ICD-10-CM

## 2022-07-28 DIAGNOSIS — Z92.29 PERSONAL HISTORY OF OTHER DRUG THERAPY: ICD-10-CM

## 2022-07-28 DIAGNOSIS — Z87.898 PERSONAL HISTORY OF OTHER SPECIFIED CONDITIONS: ICD-10-CM

## 2022-07-28 PROCEDURE — 36415 COLL VENOUS BLD VENIPUNCTURE: CPT

## 2022-07-28 PROCEDURE — 99214 OFFICE O/P EST MOD 30 MIN: CPT | Mod: 25

## 2022-07-28 NOTE — COUNSELING
[Potential consequences of obesity discussed] : Potential consequences of obesity discussed [Benefits of weight loss discussed] : Benefits of weight loss discussed [Structured Weight Management Program suggested:] : Structured weight management program suggested coagulation(Bleeding disorder R/T clinical cond/anti-coags)/other

## 2022-07-28 NOTE — ASSESSMENT
[FreeTextEntry1] : Importance of weight reduction and regular exercise reviewed with patient and his aide.\par Recommend age who helps Catracho shop to be involved in making healthier food choices.  Portion control also very important.  Catracho drinks 1 beer per day and he is been advised to avoid alcohol.  It is not helping his weight.\par Follow-up with nutritionist and with sleep medicine specialist.\par F/U 3 months.\par Labs drawn in office.\par

## 2022-07-28 NOTE — HISTORY OF PRESENT ILLNESS
[FreeTextEntry1] : Patient is following up on sleep studies and hypertension.\par Recent sleep study showed severe obstructive sleep apnea.  Patient has not followed up with sleep specialist at this time.  He is due for CPAP titration.\par Patient has been gaining weight very rapidly.  There has been multiple efforts in trying to keep patient connected with a nutritionist and to facilitate healthier meal plans.\par He has recently seen a neurologist and is being evaluated for gait instability.\par He is on Prozac for anxiety which seems to be helping.\par \par

## 2022-08-05 RX ORDER — ATENOLOL 25 MG/1
25 TABLET ORAL TWICE DAILY
Qty: 180 | Refills: 2 | Status: DISCONTINUED | COMMUNITY
Start: 2018-01-09 | End: 2022-08-05

## 2022-08-05 NOTE — HISTORY OF PRESENT ILLNESS
[FreeTextEntry1] : Mr. Licea is a 66 year-old man with PMH mental disability, preDM, HTN, HLD, urine incontinence who was referred to me by Dr. Munoz for evaluation of tremors and gait instability. He reports tremors in hands (left > right), that become worse with anxiety and nervousness. He reports gait instability, falls and uses a walker. He denies headache, loss of vision, changes in personality or cognition, difficulty speaking, unilateral weakness or impaired sensation, problems with coordination. \par

## 2022-08-05 NOTE — PHYSICAL EXAM
[FreeTextEntry1] : GENERAL PHYSICAL EXAM:\par GEN: anxious\par HEENT: NCAT, OP clear\par EYES: small round fleshy growth on upper right lid, sclera white, conjunctiva clear, no nystagmus\par NECK: supple\par CV: normal rhythm\par PULM: no respiratory distress, normal rhythm and effort\par EXT: no edema, no cyanosis\par MSK: muscle tone and strength normal\par SKIN: warm, dry, no rash or lesion on exposed skin \par \par NEUROLOGICAL EXAM:\par Mental Status\par Orientation: alert and oriented to person, place, time, and situation\par Language: clear and fluent, intact comprehension and repetition\par \par Cranial Nerves\par II: visual fields full to confrontation \par III, IV, VI: PERRL, EOMI\par V, VII: facial sensation and movement intact and symmetric \par VIII: hearing intact \par IX, X: uvula midline, soft palate elevates normally \par XI: BL shoulder shrug intact \par XII: tongue midline\par \par Motor\par 4-/5 in upper and lower extremities BL\par Tone and bulk are normal in upper and lower limbs\par Fine tremors in bilateral hands\par \par Sensation\par Intact to light touch in all 4 EXTs\par \par Reflex\par 2+ in BL biceps, brachioradialis, patella\par \par Coordination\par Normal FTN bilaterally\par Dysdiadochokinesia not present. \par Able to perform rapid, alternating movements\par \par Gait\par Normal stance, stride, and pivot turn\par Walks with walker \par \par

## 2022-08-05 NOTE — DISCUSSION/SUMMARY
[FreeTextEntry1] : Mr. Licea is a 66 year-old man with PMH mental disability, preDM, HTN, HLD, urine incontinence who presented to the office today for evaluation of tremors and gait instability. Etiology of patient's gait instability and poor balance likely multifactorial due to deconditioning and general weakness. Tremors are bilateral and worsen with action and anxiety, likely essential tremor. Patient not willing to have MRI or BEN scan to assess for PD at this time. Will transition him from atenolol 25mg BID to propranolol 40mg BID. This was approved by his cardiologist. Follow-up with me in 4 weeks. All of their questions and concerns were addressed. \par

## 2022-08-05 NOTE — REVIEW OF SYSTEMS
[Fever] : no fever [Chills] : no chills [Sleep Disturbances] : no sleep disturbances [Anxiety] : no anxiety [Depression] : no depression [Confused or Disoriented] : no confusion [Memory Lapses or Loss] : no memory loss [Decr. Concentrating Ability] : no decrease in concentrating ability [Difficulty with Language] : no ~M difficulty with language [Changed Thought Patterns] : no change in thought patterns [Facial Weakness] : no facial weakness [Arm Weakness] : no arm weakness [Hand Weakness] : no hand weakness [Leg Weakness] : no leg weakness [Poor Coordination] : good coordination [Numbness] : no numbness [Tingling] : no tingling [Seizures] : no convulsions [Dizziness] : no dizziness [Lightheadedness] : no lightheadedness [Cluster Headache] : no cluster headache [Migraine Headache] : no migraine headache [Tension Headache] : no tension-type headache [Difficulty Walking] : no difficulty walking [Frequent Falls] : not falling [Eye Pain] : no eye pain [Eyesight Problems] : no eyesight problems [Earache] : no earache [Loss Of Hearing] : no hearing loss [Chest Pain] : no chest pain [Palpitations] : no palpitations [Shortness Of Breath] : no shortness of breath [Cough] : no cough

## 2022-08-07 LAB
ESTIMATED AVERAGE GLUCOSE: 128 MG/DL
HBA1C MFR BLD HPLC: 6.1 %

## 2022-08-09 ENCOUNTER — OUTPATIENT (OUTPATIENT)
Dept: OUTPATIENT SERVICES | Facility: HOSPITAL | Age: 67
LOS: 1 days | End: 2022-08-09
Payer: MEDICARE

## 2022-08-09 DIAGNOSIS — G47.33 OBSTRUCTIVE SLEEP APNEA (ADULT) (PEDIATRIC): ICD-10-CM

## 2022-08-09 LAB
ANION GAP SERPL CALC-SCNC: 12 MMOL/L
BUN SERPL-MCNC: 21 MG/DL
CALCIUM SERPL-MCNC: 9.3 MG/DL
CHLORIDE SERPL-SCNC: 102 MMOL/L
CO2 SERPL-SCNC: 20 MMOL/L
CREAT SERPL-MCNC: 0.75 MG/DL
EGFR: 99 ML/MIN/1.73M2
GLUCOSE SERPL-MCNC: 100 MG/DL
POTASSIUM SERPL-SCNC: 4.6 MMOL/L
SODIUM SERPL-SCNC: 134 MMOL/L

## 2022-08-09 PROCEDURE — 95811 POLYSOM 6/>YRS CPAP 4/> PARM: CPT | Mod: 26

## 2022-08-09 PROCEDURE — 95811 POLYSOM 6/>YRS CPAP 4/> PARM: CPT

## 2022-08-16 NOTE — ED ADULT NURSE NOTE - PAIN RATING/NUMBER SCALE (0-10): REST
0 Nsaids Counseling: NSAID Counseling: I discussed with the patient that NSAIDs should be taken with food. Prolonged use of NSAIDs can result in the development of stomach ulcers.  Patient advised to stop taking NSAIDs if abdominal pain occurs.  The patient verbalized understanding of the proper use and possible adverse effects of NSAIDs.  All of the patient's questions and concerns were addressed.

## 2022-08-17 RX ORDER — FLUOXETINE HYDROCHLORIDE 20 MG/1
20 TABLET ORAL
Qty: 90 | Refills: 0 | Status: DISCONTINUED | COMMUNITY
Start: 2022-06-06 | End: 2022-08-17

## 2022-08-18 ENCOUNTER — APPOINTMENT (OUTPATIENT)
Dept: NEUROLOGY | Facility: CLINIC | Age: 67
End: 2022-08-18

## 2022-08-18 VITALS
SYSTOLIC BLOOD PRESSURE: 129 MMHG | BODY MASS INDEX: 36.7 KG/M2 | WEIGHT: 215 LBS | DIASTOLIC BLOOD PRESSURE: 72 MMHG | HEIGHT: 64 IN | TEMPERATURE: 99.2 F | OXYGEN SATURATION: 94 % | HEART RATE: 84 BPM

## 2022-08-18 DIAGNOSIS — R25.1 TREMOR, UNSPECIFIED: ICD-10-CM

## 2022-08-18 PROCEDURE — 99213 OFFICE O/P EST LOW 20 MIN: CPT

## 2022-08-18 NOTE — PHYSICAL EXAM
[FreeTextEntry1] : GENERAL PHYSICAL EXAM:\par GEN: anxious\par HEENT: NCAT, OP clear\par EYES: small round fleshy growth on upper right lid, sclera white, conjunctiva clear, no nystagmus\par NECK: supple\par CV: normal rhythm\par PULM: no respiratory distress, normal rhythm and effort\par EXT: no edema, no cyanosis\par MSK: muscle tone and strength normal\par SKIN: warm, dry, no rash or lesion on exposed skin \par \par NEUROLOGICAL EXAM:\par Mental Status\par Orientation: alert and oriented to person, place, time, and situation\par Language: clear and fluent, intact comprehension and repetition\par \par Cranial Nerves\par II: visual fields full to confrontation \par III, IV, VI: PERRL, EOMI\par V, VII: facial sensation and movement intact and symmetric \par VIII: hearing intact \par IX, X: uvula midline, soft palate elevates normally \par XI: BL shoulder shrug intact \par XII: tongue midline\par \par Motor\par 4-/5 in upper and lower extremities BL\par Tone and bulk are normal in upper and lower limbs\par Slight fine tremor noted with hands outstretched \par \par Sensation\par Intact to light touch in all 4 EXTs\par \par Reflex\par 2+ in BL biceps, brachioradialis, patella\par \par Coordination\par Normal FTN bilaterally\par \par Gait\par Normal stance, stride, and pivot turn\par Walks with walker \par \par

## 2022-08-18 NOTE — HISTORY OF PRESENT ILLNESS
[FreeTextEntry1] : INTERIM HISTORY: Since his last visit, Mr. Licea has been well. He is taking propanolol 40mg BID with near resolution of symptoms. He denies new or concerning neurological symptoms or side effects to medication.\par \par INITIAL OFFICE VISIT 7/7/22: Mr. Licea is a 66 year-old man with PMH mental disability, preDM, HTN, HLD, urine incontinence who was referred to me by Dr. Munoz for evaluation of tremors and gait instability. He reports tremors in hands (left > right), that become worse with anxiety and nervousness. He reports gait instability, falls and uses a walker. He denies headache, loss of vision, changes in personality or cognition, difficulty speaking, unilateral weakness or impaired sensation, problems with coordination. \par

## 2022-08-18 NOTE — DISCUSSION/SUMMARY
[FreeTextEntry1] : Mr. Licea is a 66 year-old man with PMH mental disability, preDM, HTN, HLD, urine incontinence who presented to the office today for follow-up of tremors. Tremors greatly reduced with propranolol. Continue propanolol 40mg BID. Follow-up with me in 3-4 months or sooner should the need arise.  All of their questions and concerns were addressed. \par

## 2022-08-26 ENCOUNTER — APPOINTMENT (OUTPATIENT)
Dept: UROLOGY | Facility: CLINIC | Age: 67
End: 2022-08-26

## 2022-08-26 DIAGNOSIS — R97.20 ELEVATED PROSTATE, SPECIFIC ANTIGEN [PSA]: ICD-10-CM

## 2022-08-26 PROCEDURE — 99213 OFFICE O/P EST LOW 20 MIN: CPT

## 2022-08-30 ENCOUNTER — RX RENEWAL (OUTPATIENT)
Age: 67
End: 2022-08-30

## 2022-08-31 PROBLEM — R97.20 ELEVATED PSA: Status: ACTIVE | Noted: 2022-02-11

## 2022-08-31 NOTE — HISTORY OF PRESENT ILLNESS
[FreeTextEntry1] : On tamsulosin [Urinary Urgency] : urinary urgency [Urinary Frequency] : no urinary frequency [Nocturia] : no nocturia [Straining] : no straining [Weak Stream] : no weak stream [Dysuria] : no dysuria [Hematuria - Gross] : no gross hematuria [None] : None

## 2022-09-06 ENCOUNTER — APPOINTMENT (OUTPATIENT)
Dept: CARDIOLOGY | Facility: CLINIC | Age: 67
End: 2022-09-06

## 2022-09-06 ENCOUNTER — NON-APPOINTMENT (OUTPATIENT)
Age: 67
End: 2022-09-06

## 2022-09-06 VITALS
SYSTOLIC BLOOD PRESSURE: 107 MMHG | TEMPERATURE: 98 F | BODY MASS INDEX: 36.7 KG/M2 | OXYGEN SATURATION: 96 % | HEART RATE: 80 BPM | HEIGHT: 64 IN | DIASTOLIC BLOOD PRESSURE: 66 MMHG | WEIGHT: 215 LBS

## 2022-09-06 DIAGNOSIS — I10 ESSENTIAL (PRIMARY) HYPERTENSION: ICD-10-CM

## 2022-09-06 DIAGNOSIS — I35.1 NONRHEUMATIC AORTIC (VALVE) INSUFFICIENCY: ICD-10-CM

## 2022-09-06 DIAGNOSIS — R00.9 ESSENTIAL (PRIMARY) HYPERTENSION: ICD-10-CM

## 2022-09-06 DIAGNOSIS — Z87.39 PERSONAL HISTORY OF OTHER DISEASES OF THE MUSCULOSKELETAL SYSTEM AND CONNECTIVE TISSUE: ICD-10-CM

## 2022-09-06 PROCEDURE — 99215 OFFICE O/P EST HI 40 MIN: CPT

## 2022-09-06 PROCEDURE — 93000 ELECTROCARDIOGRAM COMPLETE: CPT

## 2022-09-06 RX ORDER — VALSARTAN AND HYDROCHLOROTHIAZIDE 320; 12.5 MG/1; MG/1
320-12.5 TABLET, FILM COATED ORAL
Refills: 0 | Status: DISCONTINUED | COMMUNITY
Start: 2021-09-22 | End: 2022-09-06

## 2022-09-06 NOTE — HISTORY OF PRESENT ILLNESS
[FreeTextEntry1] : aortic dilation, hypertension , family history of aortic aneurym , \par \par \par HPI for today: : he saw a neurologist.  his tremors have improved. he does not have parkinsons.  and he was started on meds. and tremors havve improved.\par \par \par old note: This is a 66 year old male with history of obesity, and pre diabetes, hypertension and dyslipidemia , generalised anxiety, BPH, aortic dilation here for follow up for aortic dilation.\par \par he denies anyc ehst pain. no dyspnea. no dizziness.  he uses a walker because he has trouble with balance. \par he has not seen a neurologist.    \par No leg edemma.  no .syncope. no opalpitaitons. \par he also has history of fall.  no syncope.  a

## 2022-09-06 NOTE — CARDIOLOGY SUMMARY
[de-identified] : 9 6 2022v  Sinus  Rhythm \par WITHIN NORMAL LIMITS\par \par \par 3 15 2022  Sinus  Rhythm \par -Left atrial enlargement. \par BORDERLINE\par  [de-identified] : diann 2022:"  Normal LVEf. normal  RV. mild AI. Dilation of aorta 4.4  [de-identified] : apt 2022:  Us aorta: No AAA. no stenosis. no plaqwue.

## 2022-09-06 NOTE — DISCUSSION/SUMMARY
[Patient] : the patient [Risks] : risks [Benefits] : benefits [Alternatives] : alternatives [With Me] : with me [EKG obtained to assist in diagnosis and management of assessed problem(s)] : EKG obtained to assist in diagnosis and management of assessed problem(s) [___ Month(s)] : in [unfilled] month(s) [FreeTextEntry1] : This is a 66 year old male with history of obesity, and pre diabetes, hypertension and dyslipidemia , generalised anxiety, BPH, aortic dilation here for follow up for aortic dilation.\par \par \par 1) aortic dilation:   4.4 cm. BP and heart rate conrolled. family history of aortic aneurysm and repair at age  \par 2) coronary artery disease evaluation: (HTN and HLD and pre diabetes)  cardiac cta. will evaluate aorta and cornoary arteries at the same time  need cta of the heart.  did not get it done. will get it done \par 3) tremors and gain instability: likely essential tremors. improved. seen a neurologist. \par 4_) hypertension : low BOP: tripp reduce Bp meds.  ct current meds. it is controlled. reduce 320-25 to 160-25.  ct propranolol. \par 5) dyslipidemia : ct statins. lipids at goal. \par

## 2022-09-06 NOTE — PHYSICAL EXAM
[Well Developed] : well developed [Well Nourished] : well nourished [No Acute Distress] : no acute distress [Normal Conjunctiva] : normal conjunctiva [Normal Venous Pressure] : normal venous pressure [No Carotid Bruit] : no carotid bruit [Normal S1, S2] : normal S1, S2 [No Murmur] : no murmur [No Rub] : no rub [No Gallop] : no gallop [Clear Lung Fields] : clear lung fields [Good Air Entry] : good air entry [No Respiratory Distress] : no respiratory distress  [Soft] : abdomen soft [Non Tender] : non-tender [No Masses/organomegaly] : no masses/organomegaly [Normal Bowel Sounds] : normal bowel sounds [No Edema] : no edema [No Cyanosis] : no cyanosis [No Clubbing] : no clubbing [No Varicosities] : no varicosities [No Rash] : no rash [No Skin Lesions] : no skin lesions [Moves all extremities] : moves all extremities [Normal Speech] : normal speech [Alert and Oriented] : alert and oriented [Normal memory] : normal memory [de-identified] : tremors.  [de-identified] : tremors.  [de-identified] : anxiety.

## 2022-09-21 ENCOUNTER — OUTPATIENT (OUTPATIENT)
Dept: OUTPATIENT SERVICES | Facility: HOSPITAL | Age: 67
LOS: 1 days | End: 2022-09-21
Payer: MEDICARE

## 2022-09-21 DIAGNOSIS — R94.31 ABNORMAL ELECTROCARDIOGRAM [ECG] [EKG]: ICD-10-CM

## 2022-09-21 PROCEDURE — 75574 CT ANGIO HRT W/3D IMAGE: CPT | Mod: 26,MH

## 2022-09-21 PROCEDURE — 75574 CT ANGIO HRT W/3D IMAGE: CPT

## 2022-10-06 RX ORDER — ASPIRIN ENTERIC COATED TABLETS 81 MG 81 MG/1
81 TABLET, DELAYED RELEASE ORAL DAILY
Qty: 90 | Refills: 3 | Status: ACTIVE | COMMUNITY
Start: 1900-01-01 | End: 1900-01-01

## 2022-10-13 PROBLEM — I51.9 LEFT VENTRICULAR SYSTOLIC DYSFUNCTION: Status: ACTIVE | Noted: 2019-07-24

## 2022-10-18 RX ORDER — METOPROLOL TARTRATE 25 MG/1
25 TABLET, FILM COATED ORAL
Qty: 5 | Refills: 0 | Status: COMPLETED | COMMUNITY
Start: 2022-09-06 | End: 2022-10-18

## 2022-11-03 ENCOUNTER — APPOINTMENT (OUTPATIENT)
Dept: FAMILY MEDICINE | Facility: CLINIC | Age: 67
End: 2022-11-03

## 2022-11-03 VITALS
HEIGHT: 64 IN | BODY MASS INDEX: 37.05 KG/M2 | SYSTOLIC BLOOD PRESSURE: 133 MMHG | WEIGHT: 217 LBS | TEMPERATURE: 98 F | DIASTOLIC BLOOD PRESSURE: 79 MMHG | HEART RATE: 79 BPM | OXYGEN SATURATION: 95 %

## 2022-11-03 PROCEDURE — 99214 OFFICE O/P EST MOD 30 MIN: CPT

## 2022-11-03 NOTE — PHYSICAL EXAM
[No Acute Distress] : no acute distress [Normal Sclera/Conjunctiva] : normal sclera/conjunctiva [PERRL] : pupils equal round and reactive to light [EOMI] : extraocular movements intact [No JVD] : no jugular venous distention [No Lymphadenopathy] : no lymphadenopathy [Supple] : supple [Normal] : no respiratory distress, lungs were clear to auscultation bilaterally and no accessory muscle use [Normal Rate] : normal [Rhythm Regular] : regular [Normal S1] : normal S1 [Normal S2] : normal S2 [II] : a grade 2 [No Edema] : there was no peripheral edema [Soft] : abdomen soft [Obese] : obese [No Focal Deficits] : no focal deficits [de-identified] : Anxious

## 2022-11-03 NOTE — HISTORY OF PRESENT ILLNESS
[FreeTextEntry1] : Pt following up on hypertension and hyperlipidemia\par Patient had CTA 3 weeks ago and CAC score is 433.  There is also at least moderate stenosis in the proximal LAD and diagonal.\par He had recent sleep study and has severe sleep apnea. He is due for titration study.\par Pt gained 2 lbs over past month.\par He denies cp, palpitations.

## 2022-11-03 NOTE — ASSESSMENT
[FreeTextEntry1] : Lose wt.\par Nutritionist consult.\par Need records for flu and covid vaccine. Pt states he had these recently.\par Needs CPAP titration study.\par F/U with sleep medicine.\par F/U 3 months.\par All recommendations written for pt.

## 2022-11-15 ENCOUNTER — RX RENEWAL (OUTPATIENT)
Age: 67
End: 2022-11-15

## 2022-11-17 ENCOUNTER — NON-APPOINTMENT (OUTPATIENT)
Age: 67
End: 2022-11-17

## 2022-11-18 ENCOUNTER — APPOINTMENT (OUTPATIENT)
Dept: NEUROLOGY | Facility: CLINIC | Age: 67
End: 2022-11-18

## 2022-12-10 ENCOUNTER — EMERGENCY (EMERGENCY)
Facility: HOSPITAL | Age: 67
LOS: 1 days | Discharge: HOME | End: 2022-12-10
Attending: EMERGENCY MEDICINE
Payer: MEDICARE

## 2022-12-10 VITALS
OXYGEN SATURATION: 95 % | SYSTOLIC BLOOD PRESSURE: 123 MMHG | DIASTOLIC BLOOD PRESSURE: 78 MMHG | TEMPERATURE: 98 F | RESPIRATION RATE: 18 BRPM | HEART RATE: 100 BPM

## 2022-12-10 LAB
ALBUMIN SERPL ELPH-MCNC: 3.3 G/DL — SIGNIFICANT CHANGE UP (ref 3.3–5.2)
ALP SERPL-CCNC: 123 U/L — HIGH (ref 40–120)
ALT FLD-CCNC: 17 U/L — SIGNIFICANT CHANGE UP
ANION GAP SERPL CALC-SCNC: 14 MMOL/L — SIGNIFICANT CHANGE UP (ref 5–17)
AST SERPL-CCNC: 11 U/L — SIGNIFICANT CHANGE UP
BASOPHILS # BLD AUTO: 0.04 K/UL — SIGNIFICANT CHANGE UP (ref 0–0.2)
BASOPHILS NFR BLD AUTO: 0.2 % — SIGNIFICANT CHANGE UP (ref 0–2)
BILIRUB SERPL-MCNC: 0.3 MG/DL — LOW (ref 0.4–2)
BUN SERPL-MCNC: 44.6 MG/DL — HIGH (ref 8–20)
CALCIUM SERPL-MCNC: 8.7 MG/DL — SIGNIFICANT CHANGE UP (ref 8.4–10.5)
CHLORIDE SERPL-SCNC: 103 MMOL/L — SIGNIFICANT CHANGE UP (ref 96–108)
CO2 SERPL-SCNC: 22 MMOL/L — SIGNIFICANT CHANGE UP (ref 22–29)
CREAT SERPL-MCNC: 1.38 MG/DL — HIGH (ref 0.5–1.3)
EGFR: 56 ML/MIN/1.73M2 — LOW
EOSINOPHIL # BLD AUTO: 0.28 K/UL — SIGNIFICANT CHANGE UP (ref 0–0.5)
EOSINOPHIL NFR BLD AUTO: 1.6 % — SIGNIFICANT CHANGE UP (ref 0–6)
GLUCOSE SERPL-MCNC: 115 MG/DL — HIGH (ref 70–99)
HCT VFR BLD CALC: 40.4 % — SIGNIFICANT CHANGE UP (ref 39–50)
HGB BLD-MCNC: 13.2 G/DL — SIGNIFICANT CHANGE UP (ref 13–17)
IMM GRANULOCYTES NFR BLD AUTO: 0.7 % — SIGNIFICANT CHANGE UP (ref 0–0.9)
LYMPHOCYTES # BLD AUTO: 1.28 K/UL — SIGNIFICANT CHANGE UP (ref 1–3.3)
LYMPHOCYTES # BLD AUTO: 7.5 % — LOW (ref 13–44)
MCHC RBC-ENTMCNC: 29.4 PG — SIGNIFICANT CHANGE UP (ref 27–34)
MCHC RBC-ENTMCNC: 32.7 GM/DL — SIGNIFICANT CHANGE UP (ref 32–36)
MCV RBC AUTO: 90 FL — SIGNIFICANT CHANGE UP (ref 80–100)
MONOCYTES # BLD AUTO: 1.26 K/UL — HIGH (ref 0–0.9)
MONOCYTES NFR BLD AUTO: 7.3 % — SIGNIFICANT CHANGE UP (ref 2–14)
NEUTROPHILS # BLD AUTO: 14.2 K/UL — HIGH (ref 1.8–7.4)
NEUTROPHILS NFR BLD AUTO: 82.7 % — HIGH (ref 43–77)
PLATELET # BLD AUTO: 352 K/UL — SIGNIFICANT CHANGE UP (ref 150–400)
POTASSIUM SERPL-MCNC: 4.4 MMOL/L — SIGNIFICANT CHANGE UP (ref 3.5–5.3)
POTASSIUM SERPL-SCNC: 4.4 MMOL/L — SIGNIFICANT CHANGE UP (ref 3.5–5.3)
PROT SERPL-MCNC: 6.9 G/DL — SIGNIFICANT CHANGE UP (ref 6.6–8.7)
RAPID RVP RESULT: SIGNIFICANT CHANGE UP
RBC # BLD: 4.49 M/UL — SIGNIFICANT CHANGE UP (ref 4.2–5.8)
RBC # FLD: 13.8 % — SIGNIFICANT CHANGE UP (ref 10.3–14.5)
SARS-COV-2 RNA SPEC QL NAA+PROBE: SIGNIFICANT CHANGE UP
SODIUM SERPL-SCNC: 139 MMOL/L — SIGNIFICANT CHANGE UP (ref 135–145)
TROPONIN T SERPL-MCNC: 0.03 NG/ML — SIGNIFICANT CHANGE UP (ref 0–0.06)
WBC # BLD: 17.18 K/UL — HIGH (ref 3.8–10.5)
WBC # FLD AUTO: 17.18 K/UL — HIGH (ref 3.8–10.5)

## 2022-12-10 PROCEDURE — 99285 EMERGENCY DEPT VISIT HI MDM: CPT

## 2022-12-10 PROCEDURE — 70450 CT HEAD/BRAIN W/O DYE: CPT | Mod: 26,MA

## 2022-12-10 PROCEDURE — 93010 ELECTROCARDIOGRAM REPORT: CPT

## 2022-12-10 PROCEDURE — 71045 X-RAY EXAM CHEST 1 VIEW: CPT | Mod: 26

## 2022-12-10 PROCEDURE — 72125 CT NECK SPINE W/O DYE: CPT | Mod: 26,MA

## 2022-12-10 NOTE — ED ADULT NURSE NOTE - ED STAT RN HANDOFF DETAILS
handoff care to SHERRILL Chambers. handoff care at this time. pt remained on 1:1 for risk of elopement and harm to self.

## 2022-12-10 NOTE — ED ADULT TRIAGE NOTE - CHIEF COMPLAINT QUOTE
pt BIBEMS s/p trip and fall 2 hours ago witnessed by HHA who reports he hit back of his head. questionable LOC, - blood thinners. pt reports he was supposed to use rollator but was not using at the time. pt offering no complaints at this time. hx of TBI at baseline mental status as per EMS.

## 2022-12-10 NOTE — ED ADULT NURSE NOTE - NSICDXPASTMEDICALHX_GEN_ALL_CORE_FT
PAST MEDICAL HISTORY:  HLD (hyperlipidemia)     HTN (hypertension)     TBI (traumatic brain injury)

## 2022-12-10 NOTE — ED ADULT NURSE NOTE - OBJECTIVE STATEMENT
pt unable to recall having fall episode at facility, denies any pain and complaints at this time. pt is at baseline AOx2. no shortening of extremities. no abrasions or skin tears noted. aide at bedside.

## 2022-12-10 NOTE — ED PROVIDER NOTE - PROGRESS NOTE DETAILS
Gabrielle Magallanes for ED attending, Dr. Orr: spoke to Rajiv who is patientes court appointed guardian who notes pt does not have capacity to make decisions. She would like patient to stay for physical therapy eval for possible [facility] vs home physical therapy

## 2022-12-10 NOTE — ED PROVIDER NOTE - CLINICAL SUMMARY MEDICAL DECISION MAKING FREE TEXT BOX
Will check CT head, if CT negative will keep patient for physical therapy evaluation for possible [facility] placement vs home physical therapy. Spoke to pt friend at bedside again who states pt home aid was telling him that pt was slightly confused today, asking abnormal questions, when EMS arrived and they asked him was year it was pt stated 1970 which is his year of birth, pt friend notes pt sometimes get like this and is currently at baseline. Due to this reported confusion will check labs and UA to evaluate for infectious etiology, check CT head to evaluate for traumatic injury if CT negative and labs and UA wnl, will keep patient for physical therapy evaluation for possible [facility] placement vs home physical therapy. Spoke to pt friend at bedside again who states pt home aid was telling him that pt was slightly confused today, asking abnormal questions, when EMS arrived and they asked him was year it was pt stated 1970 which is his year of birth, pt friend notes pt sometimes get like this and is currently at baseline. Due to this reported confusion will check labs and UA to evaluate for infectious etiology, check CT head to evaluate for traumatic injury if CT negative and labs and UA wnl, will keep patient for physical therapy evaluation for possible [facility] placement vs home physical therapy .

## 2022-12-10 NOTE — ED PROVIDER NOTE - OBJECTIVE STATEMENT
66 y/o male with PMHx of HTN, HLD, TBI presents to the ED s/p fall. Pt states he was walking a tripped, denies LOC, pt has no complaints. Pt is supposed to walk with walker at baseline. Pt denies chest pain or SOB prior to fall. Pt does not wish to stay for Physical Therapy or [facility], however friend at bedside notes that pt has a healthcare proxy that makes decisions for him. Pt currently has aid during day time, pt does not want a night time aid but per friend at bedside healthcare proxy is trying to set one up. Denies f/c/n/v/cp/sob/palpitations/cough/rash/headache/dizziness/abd.pain/d/c/dysuria/hematuria 68 y/o male with PMHx of HTN, HLD, TBI presents to the ED s/p fall. Pt states he was walking a tripped, denies LOC, pt has no complaints. Pt is supposed to walk with walker at baseline. Pt denies chest pain or SOB prior to fall. Pt does not wish to stay for Physical Therapy or [facility], however friend at bedside notes that pt has a healthcare proxy appointed by court that makes decisions for him. Pt currently has aid during day time, pt does not want a night time aid but per friend at bedside healthcare proxy is trying to set one up. Denies f/c/n/v/cp/sob/palpitations/cough/rash/headache/dizziness/abd.pain/d/c/dysuria/hematuria

## 2022-12-10 NOTE — ED PROVIDER NOTE - PHYSICAL EXAMINATION
Head: atraumatic, normacephalic  Face: atraumatic, no crepitus no orbiral/maxillary/mandibular ttp  throat: uvula midline no exudates  eyes: perrla eomi  heart: rrr s1s2  lungs: ctab  abd: soft, nt nd +bs no rebound/guarding no cva ttp  skin: warm  LE: no swelling, no calf ttp  back: no midline cervical/thoracic/lumbar ttp Head: atraumatic, normacephalic  Face: atraumatic, no crepitus no orbiral/maxillary/mandibular ttp  throat: uvula midline no exudates  eyes: perrla eomi  heart: rrr s1s2  lungs: ctab  abd: soft, nt nd +bs no rebound/guarding no cva ttp  skin: warm  LE: no swelling, no calf ttp  back: no midline cervical/thoracic/lumbar ttp  neuro: aaox 3 cn iii-xii intact strength 5/5 bl

## 2022-12-10 NOTE — ED ADULT NURSE NOTE - NSIMPLEMENTINTERV_GEN_ALL_ED
Implemented All Fall with Harm Risk Interventions:  [place] to call system. Call bell, personal items and telephone within reach. Instruct patient to call for assistance. Room bathroom lighting operational. Non-slip footwear when patient is off stretcher. Physically safe environment: no spills, clutter or unnecessary equipment. Stretcher in lowest position, wheels locked, appropriate side rails in place. Provide visual cue, wrist band, yellow gown, etc. Monitor gait and stability. Monitor for mental status changes and reorient to person, place, and time. Review medications for side effects contributing to fall risk. Reinforce activity limits and safety measures with patient and family. Provide visual clues: red socks.

## 2022-12-11 LAB
APPEARANCE UR: CLEAR — SIGNIFICANT CHANGE UP
BACTERIA # UR AUTO: ABNORMAL
BASOPHILS # BLD AUTO: 0 K/UL — SIGNIFICANT CHANGE UP (ref 0–0.2)
BASOPHILS NFR BLD AUTO: 0 % — SIGNIFICANT CHANGE UP (ref 0–2)
BILIRUB UR-MCNC: NEGATIVE — SIGNIFICANT CHANGE UP
COLOR SPEC: YELLOW — SIGNIFICANT CHANGE UP
COMMENT - URINE: SIGNIFICANT CHANGE UP
DIFF PNL FLD: NEGATIVE — SIGNIFICANT CHANGE UP
EOSINOPHIL # BLD AUTO: 0.27 K/UL — SIGNIFICANT CHANGE UP (ref 0–0.5)
EOSINOPHIL NFR BLD AUTO: 1.7 % — SIGNIFICANT CHANGE UP (ref 0–6)
EPI CELLS # UR: SIGNIFICANT CHANGE UP
GIANT PLATELETS BLD QL SMEAR: PRESENT — SIGNIFICANT CHANGE UP
GLUCOSE UR QL: NEGATIVE MG/DL — SIGNIFICANT CHANGE UP
HCT VFR BLD CALC: 39.4 % — SIGNIFICANT CHANGE UP (ref 39–50)
HGB BLD-MCNC: 13.3 G/DL — SIGNIFICANT CHANGE UP (ref 13–17)
KETONES UR-MCNC: NEGATIVE — SIGNIFICANT CHANGE UP
LACTATE BLDV-MCNC: 2.4 MMOL/L — HIGH (ref 0.5–2)
LEUKOCYTE ESTERASE UR-ACNC: ABNORMAL
LYMPHOCYTES # BLD AUTO: 1.11 K/UL — SIGNIFICANT CHANGE UP (ref 1–3.3)
LYMPHOCYTES # BLD AUTO: 7 % — LOW (ref 13–44)
MANUAL SMEAR VERIFICATION: SIGNIFICANT CHANGE UP
MCHC RBC-ENTMCNC: 29.5 PG — SIGNIFICANT CHANGE UP (ref 27–34)
MCHC RBC-ENTMCNC: 33.8 GM/DL — SIGNIFICANT CHANGE UP (ref 32–36)
MCV RBC AUTO: 87.4 FL — SIGNIFICANT CHANGE UP (ref 80–100)
MONOCYTES # BLD AUTO: 2.48 K/UL — HIGH (ref 0–0.9)
MONOCYTES NFR BLD AUTO: 15.7 % — HIGH (ref 2–14)
NEUTROPHILS # BLD AUTO: 11.96 K/UL — HIGH (ref 1.8–7.4)
NEUTROPHILS NFR BLD AUTO: 75.6 % — SIGNIFICANT CHANGE UP (ref 43–77)
NITRITE UR-MCNC: NEGATIVE — SIGNIFICANT CHANGE UP
PH UR: 7 — SIGNIFICANT CHANGE UP (ref 5–8)
PLAT MORPH BLD: NORMAL — SIGNIFICANT CHANGE UP
PLATELET # BLD AUTO: 357 K/UL — SIGNIFICANT CHANGE UP (ref 150–400)
PROT UR-MCNC: NEGATIVE — SIGNIFICANT CHANGE UP
RBC # BLD: 4.51 M/UL — SIGNIFICANT CHANGE UP (ref 4.2–5.8)
RBC # FLD: 13.9 % — SIGNIFICANT CHANGE UP (ref 10.3–14.5)
RBC BLD AUTO: NORMAL — SIGNIFICANT CHANGE UP
RBC CASTS # UR COMP ASSIST: SIGNIFICANT CHANGE UP /HPF (ref 0–4)
SP GR SPEC: 1 — LOW (ref 1.01–1.02)
UROBILINOGEN FLD QL: NEGATIVE MG/DL — SIGNIFICANT CHANGE UP
WBC # BLD: 15.82 K/UL — HIGH (ref 3.8–10.5)
WBC # FLD AUTO: 15.82 K/UL — HIGH (ref 3.8–10.5)
WBC UR QL: SIGNIFICANT CHANGE UP /HPF (ref 0–5)

## 2022-12-11 PROCEDURE — 99218: CPT

## 2022-12-11 RX ORDER — SODIUM CHLORIDE 9 MG/ML
2000 INJECTION INTRAMUSCULAR; INTRAVENOUS; SUBCUTANEOUS ONCE
Refills: 0 | Status: COMPLETED | OUTPATIENT
Start: 2022-12-11 | End: 2022-12-11

## 2022-12-11 RX ORDER — AMLODIPINE BESYLATE 2.5 MG/1
5 TABLET ORAL DAILY
Refills: 0 | Status: DISCONTINUED | OUTPATIENT
Start: 2022-12-11 | End: 2022-12-18

## 2022-12-11 RX ORDER — ATORVASTATIN CALCIUM 80 MG/1
40 TABLET, FILM COATED ORAL AT BEDTIME
Refills: 0 | Status: DISCONTINUED | OUTPATIENT
Start: 2022-12-11 | End: 2022-12-18

## 2022-12-11 RX ORDER — HALOPERIDOL DECANOATE 100 MG/ML
2.5 INJECTION INTRAMUSCULAR ONCE
Refills: 0 | Status: DISCONTINUED | OUTPATIENT
Start: 2022-12-11 | End: 2022-12-18

## 2022-12-11 RX ADMIN — ATORVASTATIN CALCIUM 40 MILLIGRAM(S): 80 TABLET, FILM COATED ORAL at 23:03

## 2022-12-11 NOTE — PHYSICAL THERAPY INITIAL EVALUATION ADULT - PERTINENT HX OF CURRENT PROBLEM, REHAB EVAL
68 y/o male with PMHx of HTN, HLD, TBI presents to the ED s/p fall. Pt states he was walking a tripped, denies LOC, pt has no complaints.

## 2022-12-11 NOTE — ED CDU PROVIDER INITIAL DAY NOTE - ATTENDING APP SHARED VISIT CONTRIBUTION OF CARE
I, Renee Orr, performed the initial face to face bedside interview with this patient regarding history of present illness, review of symptoms and relevant past medical, social and family history.  I completed an independent physical examination.  I was the initial provider who evaluated this patient. I have signed out the follow up of any pending tests (i.e. labs, radiological studies) to the ACP.  I have communicated the patient’s plan of care and disposition with the ACP.

## 2022-12-11 NOTE — ED CDU PROVIDER INITIAL DAY NOTE - OBJECTIVE STATEMENT
68 y/o male with PMHx of HTN, HLD, TBI presents to the ED s/p fall. Pt states he was walking a tripped, denies LOC, pt has no complaints. Pt is supposed to walk with walker at baseline. Pt denies chest pain or SOB prior to fall. Pt does not wish to stay for Physical Therapy or [facility], however friend at bedside notes that pt has a healthcare proxy that makes decisions for him. Pt currently has aid during day time, pt does not want a night time aid but per friend at bedside healthcare proxy is trying to set one up. Denies f/c/n/v/cp/sob/palpitations/cough/rash/headache/dizziness/abd.pain/d/c/dysuria/hematuria

## 2022-12-11 NOTE — CHART NOTE - NSCHARTNOTEFT_GEN_A_CORE
SW NOTE: Pt rec of home w/ 24hr home assist w/ PT w/ [facility] as back up if cannot be accommodated. Rajiv Aleman, surrogate [phone] confirms 24 hr assist at home can be accommodated as of 12/12. CCCs notified. SW to continue f/u if needs are to arise.

## 2022-12-11 NOTE — ED CDU PROVIDER INITIAL DAY NOTE - PROGRESS NOTE DETAILS
As per sign-out from Dr. Vang patient in need of increase services at home which have been arranged for tomorrow now pending observation overnight and transport home tomorrow.

## 2022-12-11 NOTE — PHYSICAL THERAPY INITIAL EVALUATION ADULT - ADDITIONAL COMMENTS
Pt questionable historian: Pt reports living alone with 12 hour aide during day in a senior condo with 0 ELIZABETH. Pt amb with rollator and is independent with functional mobility, ADLs (except assist for showering). Pt has assist for IADLs. Pt does not drive and is on disability. Pt has support of friends. Pt owns RW.

## 2022-12-11 NOTE — ED CDU PROVIDER INITIAL DAY NOTE - MEDICAL DECISION MAKING DETAILS
mechanical fall pending PT/social work; as per Rajiv who is pts court appointed guardian ([phone]) pt has no capacity and she would like hi to be evaluated by PT for possible [facility] placement as has no aid at home at nighttime.

## 2022-12-12 LAB
CULTURE RESULTS: NO GROWTH — SIGNIFICANT CHANGE UP
SPECIMEN SOURCE: SIGNIFICANT CHANGE UP

## 2022-12-12 PROCEDURE — 99226: CPT

## 2022-12-12 RX ADMIN — ATORVASTATIN CALCIUM 40 MILLIGRAM(S): 80 TABLET, FILM COATED ORAL at 22:27

## 2022-12-12 RX ADMIN — AMLODIPINE BESYLATE 5 MILLIGRAM(S): 2.5 TABLET ORAL at 05:11

## 2022-12-12 NOTE — ED ADULT NURSE REASSESSMENT NOTE - NURSING NEURO ORIENTATION
disoriented to time/situation
oriented to person, place and time
pt noted with periods of forgetfulness, able to be redirected/oriented to person, place and time

## 2022-12-12 NOTE — ED ADULT NURSE REASSESSMENT NOTE - NSIMPLEMENTINTERV_GEN_ALL_ED
Implemented All Fall Risk Interventions:  [place] to call system. Call bell, personal items and telephone within reach. Instruct patient to call for assistance. Room bathroom lighting operational. Non-slip footwear when patient is off stretcher. Physically safe environment: no spills, clutter or unnecessary equipment. Stretcher in lowest position, wheels locked, appropriate side rails in place. Provide visual cue, wrist band, yellow gown, etc. Monitor gait and stability. Monitor for mental status changes and reorient to person, place, and time. Review medications for side effects contributing to fall risk. Reinforce activity limits and safety measures with patient and family.
Implemented All Fall Risk Interventions:  [place] to call system. Call bell, personal items and telephone within reach. Instruct patient to call for assistance. Room bathroom lighting operational. Non-slip footwear when patient is off stretcher. Physically safe environment: no spills, clutter or unnecessary equipment. Stretcher in lowest position, wheels locked, appropriate side rails in place. Provide visual cue, wrist band, yellow gown, etc. Monitor gait and stability. Monitor for mental status changes and reorient to person, place, and time. Review medications for side effects contributing to fall risk. Reinforce activity limits and safety measures with patient and family.

## 2022-12-12 NOTE — ED CDU PROVIDER SUBSEQUENT DAY NOTE - PROGRESS NOTE DETAILS
Pt was signed out to me at 7AM today;  no complaints. labs initial wbc decreasing, lactate minimally elevated from yesterday no further concerns from overnight team or yesterday's providers. pt tolerating po no complaints and afebrile overnight. covid/flu neg and cxr clear. pending SW to confirm dispo pt stable throughout the day pending DC home tomorrow Received during sign out. Resting comfortably. Transport set up for 9am. Anticoagulation considered, however held due to high fall risk.

## 2022-12-12 NOTE — ED ADULT NURSE REASSESSMENT NOTE - NSFALLRSKINDICTYPE_ED_ALL_ED
Impaired Gait/Need for Mobility Assisted Device
Confusion/Impaired Gait/Need for Mobility Assisted Device

## 2022-12-12 NOTE — CHART NOTE - NSCHARTNOTEFT_GEN_A_CORE
24/7 PRIVATE HIRE AIDES IN PLACE FOR START OF CARE TUE AM 12/13 9am. AMBULANCE ARRANGED FOR 9 AM P/U.  PT, GUARDIAN SAMMY MIGUEL, RN, MD AWARE OF SAME.  CM FOLLOWING.

## 2022-12-13 VITALS
RESPIRATION RATE: 18 BRPM | DIASTOLIC BLOOD PRESSURE: 65 MMHG | OXYGEN SATURATION: 91 % | HEART RATE: 109 BPM | SYSTOLIC BLOOD PRESSURE: 122 MMHG | TEMPERATURE: 99 F

## 2022-12-13 PROCEDURE — 85025 COMPLETE CBC W/AUTO DIFF WBC: CPT

## 2022-12-13 PROCEDURE — 0225U NFCT DS DNA&RNA 21 SARSCOV2: CPT

## 2022-12-13 PROCEDURE — G0378: CPT

## 2022-12-13 PROCEDURE — 71045 X-RAY EXAM CHEST 1 VIEW: CPT

## 2022-12-13 PROCEDURE — 80053 COMPREHEN METABOLIC PANEL: CPT

## 2022-12-13 PROCEDURE — 81001 URINALYSIS AUTO W/SCOPE: CPT

## 2022-12-13 PROCEDURE — 72125 CT NECK SPINE W/O DYE: CPT | Mod: MA

## 2022-12-13 PROCEDURE — 71275 CT ANGIOGRAPHY CHEST: CPT | Mod: 26,MA

## 2022-12-13 PROCEDURE — 70450 CT HEAD/BRAIN W/O DYE: CPT | Mod: MA

## 2022-12-13 PROCEDURE — 99285 EMERGENCY DEPT VISIT HI MDM: CPT | Mod: 25

## 2022-12-13 PROCEDURE — 83605 ASSAY OF LACTIC ACID: CPT

## 2022-12-13 PROCEDURE — 99217: CPT

## 2022-12-13 PROCEDURE — 71275 CT ANGIOGRAPHY CHEST: CPT | Mod: MA

## 2022-12-13 PROCEDURE — 93005 ELECTROCARDIOGRAM TRACING: CPT

## 2022-12-13 PROCEDURE — 36415 COLL VENOUS BLD VENIPUNCTURE: CPT

## 2022-12-13 PROCEDURE — 84484 ASSAY OF TROPONIN QUANT: CPT

## 2022-12-13 PROCEDURE — 87086 URINE CULTURE/COLONY COUNT: CPT

## 2022-12-13 RX ORDER — AZITHROMYCIN 500 MG/1
250 TABLET, FILM COATED ORAL
Qty: 1250 | Refills: 0
Start: 2022-12-13 | End: 2022-12-17

## 2022-12-13 RX ADMIN — AMLODIPINE BESYLATE 5 MILLIGRAM(S): 2.5 TABLET ORAL at 05:01

## 2022-12-13 NOTE — ED ADULT NURSE REASSESSMENT NOTE - BREATHING
spontaneous/unlabored

## 2022-12-13 NOTE — ED CDU PROVIDER SUBSEQUENT DAY NOTE - ATTENDING APP SHARED VISIT CONTRIBUTION OF CARE
I personally saw the patient with the PA, and completed the key components of the history and physical exam. I then discussed the management plan with the PA.
66 yo male PMHx TBI ,fall.. Patient has private hire aids from 24/7. Transport set up from 9AM.

## 2022-12-13 NOTE — ED CDU PROVIDER SUBSEQUENT DAY NOTE - MEDICAL DECISION MAKING DETAILS
68 yo male PMHx TBI, HTN, HLD placed in CDU following fall. Patient has private hire aids from 24/7. Transport set up from 9AM.
VIVIANE for dispo planning

## 2022-12-13 NOTE — ED ADULT NURSE REASSESSMENT NOTE - NURSING MUSC ROM
full range of motion in all extremities
full range of motion in all extremities
generalized weakness

## 2022-12-13 NOTE — ED CDU PROVIDER DISPOSITION NOTE - CLINICAL COURSE
66 y/o male with PMHx of HTN, HLD, TBI presented to the ED s/p mechanical fall in home. pt denies complaints, cleared by PT for return home with walker, has home assistance around the clock. CTa chest + for compression fractures discussed with patient, likely chronic and aware to follow up with spine. will treat pna with azithro. pt denies al;l complaints at time of DC. tolerating po.

## 2022-12-13 NOTE — ED ADULT NURSE REASSESSMENT NOTE - NS ED NURSE REASSESS COMMENT FT1
provided pt with bottled water, juice and yogurt to facilitate urine specimen collection.
received pt from off going shift. pt a&ox2, denies any pain/discomfort. 1:1 @ bedside, pending placement. updated on plan of care, verbalize understanding. call bell in reach
pt resting comfortably on stretcher conversing with 1:1 NA at bedside. nad, rounds frequently provided for pt comfort and safety. pt awaiting social work for dispo.
Assumed care of pt at 19:15 as stated in report from SHERRILL Garnica. Charting as noted. Patient A&O x2, denies pain/discomfort, denies CP/SOB. Updated on the plan of care. Call bell within reach, bed locked in lowest position. No signs of acute distress noted, safety maintained. pt remained on 1:1 for safety. pt awaiting SW in AM.
pt resting comfortably on stretcher conversing with 1:1 NA. nad, rounds frequently provided for pt comfort and safety. awaiting dispo with home care in the morning
pt resting comfortably on stretcher conversing with 1:1 NA. nad, rounds frequently provided for pt comfort and safety. awaiting dispo with home care in the morning.
pt resting comfortably on stretcher conversing with 1:1 NA. nad, rounds frequently provided for pt comfort and safety. awaiting dispo with home care in the morning.
Assumed care of the patient @8150. Pt A&Ox3, forgetful at time. Pt on 1:1 for safety. Pt to be discharged to home today. Safety maintained. Assistance provided with meals. Will continue to monitor.
pt received from Jeimy MCCARTNEY at 0320. no s/s of acute distress, pt resting comfortably on stretcher. pt denies chest pain/pressure/tightness, palpitations, sob/dyspnea. pt repetitively repeating to RN, "I want to go home". pt is able to be redirected. pt received with 1:1 NA at bedside for pt's safety. pt awaiting social work for dispo.
pt received from RN at 1930. no s/s of acute distress, pt resting comfortably on stretcher. pt denies chest pain/pressure/tightness, palpitations, sob/dyspnea. 1:1 NA at bedside for pt's safety. pt to be d/c home with 24hr care. pt understanding in plan of care. awaiting d/c tomorrow morning.

## 2022-12-13 NOTE — ED CDU PROVIDER DISPOSITION NOTE - PATIENT PORTAL LINK FT
You can access the FollowMyHealth Patient Portal offered by [facility] by registering at the following website: [email or web address]. By joining [facility]’s FollowMyHealth portal, you will also be able to view your health information using other applications (apps) compatible with our system.

## 2022-12-13 NOTE — ED ADULT NURSE REASSESSMENT NOTE - COMFORT CARE
meal provided/plan of care explained/po fluids offered/repositioned/wait time explained/warm blanket provided
meal provided/plan of care explained/po fluids offered/repositioned/wait time explained/warm blanket provided
meal provided/plan of care explained/po fluids offered/wait time explained/warm blanket provided

## 2022-12-13 NOTE — ED CDU PROVIDER DISPOSITION NOTE - NSFOLLOWUPINSTRUCTIONS_ED_ALL_ED_FT
- Follow up with Spine within 1 week   - Take Tylenol for pain every 6 hours   - take antibiotics x 5 days as instructed   - Please follow up with your Primary Care Doctor in 24-48 hr.  - Seek immediate medical care for any new, worsening or concerning signs or symptoms.   - Take medications as directed, be sure to read all instructions on packaging  - You were given copies of all your test results, please bring to your primary care doctor for review of any abnormal test results  - Follow up with  listed above, in 1 week    Feel better!

## 2022-12-13 NOTE — ED CDU PROVIDER DISPOSITION NOTE - NSFOLLOWUPCLINICS_GEN_ALL_ED_FT
Called and spoke with mother. She confirms was taken to [facility] on 2/6 for 2 days of cough, congestion, fever. She was dx R otitis and Covid-19. She is completing the 10 day Abx course for the R otitis. Mom is calling because she is concerned for the worsening to cough and croup sound.Cough had improved throughout the illness and has now returned. She feels the cough is much tighter sounding, patient has more croup/stridor sound to voice with coughing fits similar to the beginning of her illness. This is a concern as is day 9 since symptom onset. She denies respiratory distress, child remains playful and drinking well.    Scheduled today at 1400 for f/u with PCP d/t worsening cough, stridor on day 9 of illness with Covid-19. Mother also not feeling well. Aware of check in process for safety of other patients.   
Mother called stating patient has tested positive for COVID, last Sunday and mother now states she thinks she still has it. Mother stated the Cough came back and it came back worse, mother stated it sounds like a barky cough. There was a steroid that was prescribed for her since December, now with patient having COVID, the cough is worse and different this time, a long with a runny nose. Mother is wanting to express her concern regarding the cough that patient now has and is worse this time. Informed mother this message will be sent to our nurse and will be calling her back with more information. Mother voice understanding, no further questions asked.   
[facility] - [facility]  Ortho/Spine  [address] [place] [address]  Phone: [phone]  Fax:

## 2022-12-13 NOTE — ED CDU PROVIDER SUBSEQUENT DAY NOTE - HISTORY
pt with no acute complaints while in observation pending sw to see in the AM FOR DISPO planning
Low O2, has been tachycardic persistently, CTA ordered. Received no calls by RN overnight.

## 2022-12-13 NOTE — ED CDU PROVIDER SUBSEQUENT DAY NOTE - NSICDXPASTMEDICALHX_GEN_ALL_CORE_FT
PAST MEDICAL HISTORY:  HLD (hyperlipidemia)     HTN (hypertension)     TBI (traumatic brain injury)     
PAST MEDICAL HISTORY:  HLD (hyperlipidemia)     HTN (hypertension)     TBI (traumatic brain injury)

## 2022-12-13 NOTE — ED CDU PROVIDER SUBSEQUENT DAY NOTE - NS ED ATTENDING STATEMENT MOD
This was a shared visit with the REBECCA. I reviewed and verified the documentation and independently performed the documented:
This was a shared visit with the REBECCA. I reviewed and verified the documentation and independently performed the documented:

## 2022-12-13 NOTE — ED CDU PROVIDER SUBSEQUENT DAY NOTE - PROGRESS NOTE DETAILS
CT results noted. Afebrile, no cough, chest pain/SOB. No midline tenderness; reports falls in past. CT results noted. Afebrile, no cough, chest pain/SOB. No midline tenderness and has not been c/o back pain; reports falls in past.

## 2022-12-13 NOTE — ED CDU PROVIDER SUBSEQUENT DAY NOTE - PHYSICAL EXAMINATION
Head: atraumatic, normacephalic  Face: atraumatic, no crepitus no orbiral/maxillary/mandibular ttp  throat: uvula midline no exudates  eyes: perrla eomi  heart: rrr s1s2  lungs: ctab  abd: soft, nt nd +bs no rebound/guarding no cva ttp  skin: warm  LE: no swelling, no calf ttp  back: no midline cervical/thoracic/lumbar ttp
Head: atraumatic, normacephalic  Face: atraumatic, no crepitus no orbiral/maxillary/mandibular ttp  throat: uvula midline no exudates  eyes: perrla eomi  heart: rrr s1s2  lungs: ctab  abd: soft, nt nd +bs no rebound/guarding no cva ttp  skin: warm  LE: no swelling, no calf ttp  back: no midline cervical/thoracic/lumbar ttp

## 2022-12-21 ENCOUNTER — APPOINTMENT (OUTPATIENT)
Dept: FAMILY MEDICINE | Facility: CLINIC | Age: 67
End: 2022-12-21

## 2022-12-21 VITALS
WEIGHT: 201 LBS | TEMPERATURE: 97.2 F | DIASTOLIC BLOOD PRESSURE: 70 MMHG | HEIGHT: 64 IN | OXYGEN SATURATION: 97 % | HEART RATE: 109 BPM | BODY MASS INDEX: 34.31 KG/M2 | SYSTOLIC BLOOD PRESSURE: 119 MMHG | RESPIRATION RATE: 16 BRPM

## 2022-12-21 DIAGNOSIS — F51.04 PSYCHOPHYSIOLOGIC INSOMNIA: ICD-10-CM

## 2022-12-21 DIAGNOSIS — Z23 ENCOUNTER FOR IMMUNIZATION: ICD-10-CM
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
## 2022-12-21 PROCEDURE — 99495 TRANSJ CARE MGMT MOD F2F 14D: CPT | Mod: 25

## 2022-12-21 PROCEDURE — G0008: CPT

## 2022-12-21 PROCEDURE — 90662 IIV NO PRSV INCREASED AG IM: CPT

## 2022-12-21 NOTE — HEALTH RISK ASSESSMENT
[Any fall with injury in past year] : Patient reported fall with injury in the past year [Assistive Device] : Patient uses an assistive device

## 2022-12-30 ENCOUNTER — RX RENEWAL (OUTPATIENT)
Age: 67
End: 2022-12-30

## 2023-02-02 ENCOUNTER — APPOINTMENT (OUTPATIENT)
Dept: FAMILY MEDICINE | Facility: CLINIC | Age: 68
End: 2023-02-02
Payer: MEDICARE

## 2023-02-02 VITALS
SYSTOLIC BLOOD PRESSURE: 116 MMHG | HEART RATE: 87 BPM | OXYGEN SATURATION: 97 % | WEIGHT: 199 LBS | HEIGHT: 64 IN | DIASTOLIC BLOOD PRESSURE: 60 MMHG | BODY MASS INDEX: 33.97 KG/M2

## 2023-02-02 DIAGNOSIS — R35.0 FREQUENCY OF MICTURITION: ICD-10-CM

## 2023-02-02 DIAGNOSIS — Z87.01 PERSONAL HISTORY OF PNEUMONIA (RECURRENT): ICD-10-CM

## 2023-02-02 PROCEDURE — 36415 COLL VENOUS BLD VENIPUNCTURE: CPT

## 2023-02-02 PROCEDURE — 99215 OFFICE O/P EST HI 40 MIN: CPT | Mod: 25

## 2023-02-02 NOTE — HISTORY OF PRESENT ILLNESS
[FreeTextEntry1] : C/o increase in urination.\par Patient is following up on multiple chronic conditions.

## 2023-02-02 NOTE — ASSESSMENT
[FreeTextEntry1] : Patient is clinically stable from a pulmonary standpoint.\par Continue with PT and OT for fall prevention and increased stability.\par Start trazodone at bedtime.\par Importance of healthy diet reviewed.\par Follow-up 1 month.\par Have DEXA prior to follow-up.\par Will address indeterminate right renal lesion 1.9 cm, at follow-up.

## 2023-02-02 NOTE — HISTORY OF PRESENT ILLNESS
[Post-hospitalization from ___ Hospital] : Post-hospitalization from [unfilled] Hospital [Admitted on: ___] : The patient was admitted on [unfilled] [FreeTextEntry2] : Patient tripped and fell on 12/10/2022 and was admitted to [facility] for further evaluation.  Patient states he hit his head, but this was not confirmed.  Health care aide said that she had heard that he was slightly disoriented after the fall.  Hospital evaluation revealed no acute intracranial pathology.  CTA of the chest revealed bilateral lower lobe atelectasis with possible early infiltrate at the right lower lobe.  Patient was treated for pneumonia with azithromycin and was discharged to home.  Incidentally it was noted that he had multiple compression fractures of the thoracic spine.  The patient denies any back pain.  CTA of the chest also revealed an incidental right renal lesion measuring 1.9 cm which was indeterminate in nature.\par He has a new home care aide who has been helping to eliminate excess carbs and sugars in his diet.  He has lost a total of 16 pounds over the past month.  The home health aide states that she has eliminated soda and beer from his diet and has decreased some of the fast foods that he was eating regularly.\par Mr. Licea has home PT that has been arranged for him but has not started.\par The home health aide has noted that the patient has been having difficulty falling asleep and staying asleep.  He has also had periods of wandering at night.

## 2023-02-02 NOTE — PLAN
[FreeTextEntry1] : Pt encounter incorporated clinical review of the medical record including extensive hospital records, review of lab and diagnostic testing with interpretation and discussion of results with patient, general pt counseling, and coordination of care, as well as documentation update within the electronic medical record.\par \par Time spent: 60 mins.\par

## 2023-02-02 NOTE — DATA REVIEWED
[FreeTextEntry1] : Labs 12/11/2022: WBC 15.82 hemoglobin 13.3 hematocrit 39.4 platelet count 357 there is a mild left shift on differential.\par Urinalysis negative.\par Urine culture negative.\par Respiratory viral panel with COVID-19.  Negative.\par CMP BUN 44.6 creatinine 1.38\par LFTs WNL.\par Troponin T negative\par CT of the head 12/10/2022: No acute intracranial hemorrhage, territorial infarct, mass-effect or calvarial fracture.\par No cervical spine fracture or traumatic spondylolisthesis.\par CT cervical spine: Mild disc space narrowing is seen at C5-C6.  Small marginal osteophytes are noted from C3-C6.  At C5-C6 there is a posterior disc osteophyte complex resulting in flattening of the ventral thecal sac and mild left neural foraminal stenosis.  No cervical spine fracture or traumatic spondylolisthesis.\par Chest x-ray no acute pulmonary disease.\par CT angiography of the chest 12/13/2022: Motion degraded exam.  No pulmonary embolism.  Patchy right lung base opacities which may be on infectious basis and more than atelectasis.  Please correlate clinically.\par Indeterminate right renal lesion.  Follow-up with dedicated renal imaging on a nonemergent basis to further characterize,  measuring 1.9 cm.\par Vertebral body analysis: Vertebral compression fractures as described consider work-up for osteoporosis.  Moderate compression fracture noted at T6, T7, T8, T11.  L1 is low bone density suspicious for osteoporosis.

## 2023-02-02 NOTE — PHYSICAL EXAM
[No Acute Distress] : no acute distress [Normal Sclera/Conjunctiva] : normal sclera/conjunctiva [No JVD] : no jugular venous distention [No Lymphadenopathy] : no lymphadenopathy [Supple] : supple [Normal Rate] : normal [Rhythm Regular] : regular [Normal S1] : normal S1 [Normal S2] : normal S2 [II] : a grade 2 [No Edema] : there was no peripheral edema [Soft] : abdomen soft [Obese] : obese [Normal] : no posterior cervical lymphadenopathy and no anterior cervical lymphadenopathy [No Focal Deficits] : no focal deficits [de-identified] : Anxiou

## 2023-02-02 NOTE — ASSESSMENT
[FreeTextEntry1] : Patient has shown improvement with his balance and making progress with OT/PT.  He continues to have periods of increased anxiety.\par Trazodone is helping with sleep at night and will increase from 50 mg at at bedtime to 75 mg at at bedtime.\par Patient still with problematic anxiety episodes during the day.\par Recommend psychiatry evaluation.\par Catracho has had significant improvements in his diet and continues to lose weight.  Patient is encouraged to continue with dietary modifications with goals of ongoing weight loss to healthier weight.\par He has been noted to have increase in urination.  Will rule out diabetes with blood work today.  We will also rule out urinary tract infection.  Patient unable to submit urine sample today and will arrange for home urine sample pickup.\par Bone density scan previously ordered to assess vertebral compression fracture.\par Renal ultrasound ordered to follow-up on indeterminate right renal lesion.\par He has sleep apnea but has never received his CPAP equipment.  Patient is advised to follow-up with sleep medicine specialist.\par Follow-up 2 months.\par Labs drawn in office.\par Verbal consent for behavioral health consultation obtained.\par \par

## 2023-02-02 NOTE — PHYSICAL EXAM
[No Acute Distress] : no acute distress [Normal Sclera/Conjunctiva] : normal sclera/conjunctiva [PERRL] : pupils equal round and reactive to light [EOMI] : extraocular movements intact [No JVD] : no jugular venous distention [No Lymphadenopathy] : no lymphadenopathy [Supple] : supple [Normal Rate] : normal [Rhythm Regular] : regular [Normal S1] : normal S1 [Normal S2] : normal S2 [II] : a grade 2 [No Edema] : there was no peripheral edema [Soft] : abdomen soft [Obese] : obese [Normal] : no posterior cervical lymphadenopathy and no anterior cervical lymphadenopathy [No Focal Deficits] : no focal deficits [de-identified] : Anxious

## 2023-02-03 ENCOUNTER — LABORATORY RESULT (OUTPATIENT)
Age: 68
End: 2023-02-03

## 2023-02-06 ENCOUNTER — LABORATORY RESULT (OUTPATIENT)
Age: 68
End: 2023-02-06

## 2023-02-06 LAB
ANION GAP SERPL CALC-SCNC: 10 MMOL/L
BUN SERPL-MCNC: 22 MG/DL
CALCIUM SERPL-MCNC: 9 MG/DL
CHLORIDE SERPL-SCNC: 102 MMOL/L
CO2 SERPL-SCNC: 25 MMOL/L
CREAT SERPL-MCNC: 1.08 MG/DL
EGFR: 75 ML/MIN/1.73M2
ESTIMATED AVERAGE GLUCOSE: 131 MG/DL
GLUCOSE SERPL-MCNC: 97 MG/DL
HBA1C MFR BLD HPLC: 6.2 %
POTASSIUM SERPL-SCNC: 4.4 MMOL/L
SODIUM SERPL-SCNC: 137 MMOL/L

## 2023-02-07 ENCOUNTER — LABORATORY RESULT (OUTPATIENT)
Age: 68
End: 2023-02-07

## 2023-02-07 ENCOUNTER — NON-APPOINTMENT (OUTPATIENT)
Age: 68
End: 2023-02-07

## 2023-02-13 ENCOUNTER — TRANSCRIPTION ENCOUNTER (OUTPATIENT)
Age: 68
End: 2023-02-13

## 2023-02-17 ENCOUNTER — APPOINTMENT (OUTPATIENT)
Dept: FAMILY MEDICINE | Facility: CLINIC | Age: 68
End: 2023-02-17

## 2023-02-17 DIAGNOSIS — Z23 ENCOUNTER FOR IMMUNIZATION: ICD-10-CM

## 2023-02-17 DIAGNOSIS — Z11.59 ENCOUNTER FOR SCREENING FOR OTHER VIRAL DISEASES: ICD-10-CM

## 2023-02-17 DIAGNOSIS — Z20.9 CONTACT WITH AND (SUSPECTED) EXPOSURE TO UNSPECIFIED COMMUNICABLE DISEASE: ICD-10-CM

## 2023-02-17 DIAGNOSIS — Z12.11 ENCOUNTER FOR SCREENING FOR MALIGNANT NEOPLASM OF COLON: ICD-10-CM

## 2023-02-17 DIAGNOSIS — Z12.83 ENCOUNTER FOR SCREENING FOR MALIGNANT NEOPLASM OF SKIN: ICD-10-CM

## 2023-02-20 ENCOUNTER — RX RENEWAL (OUTPATIENT)
Age: 68
End: 2023-02-20

## 2023-02-22 ENCOUNTER — TRANSCRIPTION ENCOUNTER (OUTPATIENT)
Age: 68
End: 2023-02-22

## 2023-02-23 ENCOUNTER — NON-APPOINTMENT (OUTPATIENT)
Age: 68
End: 2023-02-23

## 2023-02-24 ENCOUNTER — LABORATORY RESULT (OUTPATIENT)
Age: 68
End: 2023-02-24

## 2023-02-24 ENCOUNTER — APPOINTMENT (OUTPATIENT)
Dept: UROLOGY | Facility: CLINIC | Age: 68
End: 2023-02-24

## 2023-03-02 ENCOUNTER — LABORATORY RESULT (OUTPATIENT)
Age: 68
End: 2023-03-02

## 2023-03-06 ENCOUNTER — OUTPATIENT (OUTPATIENT)
Dept: OUTPATIENT SERVICES | Facility: HOSPITAL | Age: 68
LOS: 1 days | End: 2023-03-06
Payer: MEDICARE

## 2023-03-06 DIAGNOSIS — G47.33 OBSTRUCTIVE SLEEP APNEA (ADULT) (PEDIATRIC): ICD-10-CM

## 2023-03-06 PROCEDURE — 95811 POLYSOM 6/>YRS CPAP 4/> PARM: CPT

## 2023-03-08 ENCOUNTER — LABORATORY RESULT (OUTPATIENT)
Age: 68
End: 2023-03-08

## 2023-03-09 ENCOUNTER — LABORATORY RESULT (OUTPATIENT)
Age: 68
End: 2023-03-09

## 2023-03-20 ENCOUNTER — RX RENEWAL (OUTPATIENT)
Age: 68
End: 2023-03-20

## 2023-04-03 ENCOUNTER — APPOINTMENT (OUTPATIENT)
Dept: FAMILY MEDICINE | Facility: CLINIC | Age: 68
End: 2023-04-03
Payer: MEDICARE

## 2023-04-03 VITALS
BODY MASS INDEX: 33.12 KG/M2 | WEIGHT: 194 LBS | HEIGHT: 64 IN | SYSTOLIC BLOOD PRESSURE: 120 MMHG | DIASTOLIC BLOOD PRESSURE: 70 MMHG | RESPIRATION RATE: 16 BRPM | TEMPERATURE: 98 F | OXYGEN SATURATION: 97 % | HEART RATE: 78 BPM

## 2023-04-03 DIAGNOSIS — F41.1 GENERALIZED ANXIETY DISORDER: ICD-10-CM

## 2023-04-03 DIAGNOSIS — S22.000A WEDGE COMPRESSION FRACTURE OF UNSPECIFIED THORACIC VERTEBRA, INITIAL ENCOUNTER FOR CLOSED FRACTURE: ICD-10-CM

## 2023-04-03 DIAGNOSIS — N28.9 DISORDER OF KIDNEY AND URETER, UNSPECIFIED: ICD-10-CM

## 2023-04-03 PROCEDURE — 99214 OFFICE O/P EST MOD 30 MIN: CPT

## 2023-04-03 NOTE — HISTORY OF PRESENT ILLNESS
[FreeTextEntry1] : Patient is following up on hypertension and obesity.\par He has lost 5 pounds over the past 2 months.  Diet has been adjusted with the help of his aids.  He admits that he is not exercising and is sitting throughout the day.\par He has a history of an indeterminate lesion of the right kidney and prior renal ultrasound was ordered.  Patient has not gone yet.\par Patient also has a history of thoracic compression fracture.  DEXA was ordered and test has not been completed as of yet.\par Patient had trazodone increased from 50 mg at at bedtime to 75 mg at at bedtime due to nighttime anxiety and insomnia.  He states that the medication change has worked well and he is sleeping fine.\par \par

## 2023-04-03 NOTE — PHYSICAL EXAM
[No Acute Distress] : no acute distress [Normal Sclera/Conjunctiva] : normal sclera/conjunctiva [No JVD] : no jugular venous distention [No Lymphadenopathy] : no lymphadenopathy [Supple] : supple [Normal] : no respiratory distress, lungs were clear to auscultation bilaterally and no accessory muscle use [Normal Rate] : normal [Rhythm Regular] : regular [Normal S1] : normal S1 [Normal S2] : normal S2 [II] : a grade 2 [No Edema] : there was no peripheral edema [Soft] : abdomen soft [No Focal Deficits] : no focal deficits [de-identified] : Anxiou

## 2023-04-17 ENCOUNTER — RX RENEWAL (OUTPATIENT)
Age: 68
End: 2023-04-17

## 2023-04-19 ENCOUNTER — LABORATORY RESULT (OUTPATIENT)
Age: 68
End: 2023-04-19

## 2023-04-21 ENCOUNTER — APPOINTMENT (OUTPATIENT)
Dept: RADIOLOGY | Facility: CLINIC | Age: 68
End: 2023-04-21
Payer: MEDICARE

## 2023-04-21 ENCOUNTER — APPOINTMENT (OUTPATIENT)
Dept: ULTRASOUND IMAGING | Facility: CLINIC | Age: 68
End: 2023-04-21
Payer: MEDICARE

## 2023-04-21 ENCOUNTER — OUTPATIENT (OUTPATIENT)
Dept: OUTPATIENT SERVICES | Facility: HOSPITAL | Age: 68
LOS: 1 days | End: 2023-04-21
Payer: MEDICARE

## 2023-04-21 DIAGNOSIS — M81.0 AGE-RELATED OSTEOPOROSIS WITHOUT CURRENT PATHOLOGICAL FRACTURE: ICD-10-CM

## 2023-04-21 PROCEDURE — 76775 US EXAM ABDO BACK WALL LIM: CPT | Mod: 26

## 2023-04-21 PROCEDURE — 77080 DXA BONE DENSITY AXIAL: CPT | Mod: 26

## 2023-04-21 PROCEDURE — 76775 US EXAM ABDO BACK WALL LIM: CPT

## 2023-04-21 PROCEDURE — 77080 DXA BONE DENSITY AXIAL: CPT

## 2023-04-26 ENCOUNTER — APPOINTMENT (OUTPATIENT)
Dept: PULMONOLOGY | Facility: CLINIC | Age: 68
End: 2023-04-26
Payer: MEDICARE

## 2023-04-26 VITALS
DIASTOLIC BLOOD PRESSURE: 78 MMHG | WEIGHT: 190 LBS | OXYGEN SATURATION: 97 % | RESPIRATION RATE: 16 BRPM | BODY MASS INDEX: 32.61 KG/M2 | SYSTOLIC BLOOD PRESSURE: 122 MMHG | HEART RATE: 73 BPM

## 2023-04-26 PROCEDURE — 99214 OFFICE O/P EST MOD 30 MIN: CPT

## 2023-04-26 NOTE — PHYSICAL EXAM
[No Acute Distress] : no acute distress [Normal Oropharynx] : normal oropharynx [Low Lying Soft Palate] : low lying soft palate [Enlarged Base of the Tongue] : enlarged base of the tongue [IV] : Mallampati Class: IV [Normal Appearance] : normal appearance [No Neck Mass] : no neck mass [Normal Rate/Rhythm] : normal rate/rhythm [Normal S1, S2] : normal s1, s2 [No Murmurs] : no murmurs [No Resp Distress] : no resp distress [Clear to Auscultation Bilaterally] : clear to auscultation bilaterally [No Abnormalities] : no abnormalities [Benign] : benign [No Clubbing] : no clubbing [No Cyanosis] : no cyanosis [No Edema] : no edema [FROM] : FROM [Normal Color/ Pigmentation] : normal color/ pigmentation [No Focal Deficits] : no focal deficits [TextBox_99] : Walks with walker [TextBox_140] : Intellectual disability

## 2023-04-26 NOTE — ASSESSMENT
[FreeTextEntry1] : Patient with severe obstructive sleep apnea with severe hypoxemia.  CPAP 11 is therapeutic.  The patient states she slept well with it in the lab.  CPAP at 11 via full facemask was ordered.  I will see the patient back after he has been on it for 6 weeks to evaluate compliance and efficacy.

## 2023-04-26 NOTE — HISTORY OF PRESENT ILLNESS
[TextBox_4] : Patient with intellectual disability who reports multiple episodes of nocturia, excessive daytime sleepiness.  He was sent for sleep study in the past and now comes for evaluation.  He reports that he slept well with CPAP in the lab. [Obstructive Sleep Apnea] : obstructive sleep apnea [Lab] : lab [TextBox_100] : 6/22 [TextBox_108] : 84 [TextBox_112] : 32 [TextBox_116] : 73 [TextBox_120] : CPAP therapeutic at 11.      [TextBox_104] : 3/23

## 2023-05-30 ENCOUNTER — RX RENEWAL (OUTPATIENT)
Age: 68
End: 2023-05-30

## 2023-06-02 DIAGNOSIS — Z91.81 HISTORY OF FALLING: ICD-10-CM

## 2023-06-05 ENCOUNTER — NON-APPOINTMENT (OUTPATIENT)
Age: 68
End: 2023-06-05

## 2023-06-05 ENCOUNTER — APPOINTMENT (OUTPATIENT)
Dept: FAMILY MEDICINE | Facility: CLINIC | Age: 68
End: 2023-06-05
Payer: MEDICARE

## 2023-06-05 VITALS
OXYGEN SATURATION: 97 % | WEIGHT: 188 LBS | SYSTOLIC BLOOD PRESSURE: 126 MMHG | HEART RATE: 125 BPM | HEIGHT: 64 IN | BODY MASS INDEX: 32.1 KG/M2 | RESPIRATION RATE: 16 BRPM | TEMPERATURE: 97.3 F | DIASTOLIC BLOOD PRESSURE: 68 MMHG

## 2023-06-05 DIAGNOSIS — M81.0 AGE-RELATED OSTEOPOROSIS W/OUT CURRENT PATHOLOGICAL FRACTURE: ICD-10-CM

## 2023-06-05 DIAGNOSIS — R00.0 TACHYCARDIA, UNSPECIFIED: ICD-10-CM

## 2023-06-05 PROCEDURE — 99215 OFFICE O/P EST HI 40 MIN: CPT | Mod: 25

## 2023-06-05 PROCEDURE — 36415 COLL VENOUS BLD VENIPUNCTURE: CPT

## 2023-06-05 PROCEDURE — 93000 ELECTROCARDIOGRAM COMPLETE: CPT

## 2023-06-05 NOTE — HISTORY OF PRESENT ILLNESS
[Formal Caregiver] : formal caregiver [FreeTextEntry1] : Had recent DEXA which resulted with osteoporosis at the femoral neck and spine.\par Recently completed sleep study and has severe sleep apnea, awaiting CPAP, 11 cm H2O.\par He had a hyperdense lesion noted on the right kidney on CT and renal ultrasound was completed for evaluation.  The renal ultrasound revealed multiple bilateral simple cysts.  The hyperdense lesion seen on CT corresponded to simple cyst on ultrasound.\par He has a new health aide and ongoing diet modification with weight loss, intentional.  He has lost 11 pounds over the past 4 months.

## 2023-06-05 NOTE — ASSESSMENT
[FreeTextEntry1] : Increase propanolol from 40 mg every 12 hours to 60 mg every 12 hours.\par Keep well-hydrated.\par Avoid caffeine.\par Rule out metabolic disturbance.  Labs drawn in office.\par Start alendronate for her osteoporosis.\par Start calcium 600 mg 1 p.o. twice daily.\par Start vitamin D 5000 units/day.\par Awaiting CPAP equipment.\par Follow-up in 3 days to reevaluate due to sinus tachycardia.\par

## 2023-06-05 NOTE — PHYSICAL EXAM
[No Acute Distress] : no acute distress [Normal Sclera/Conjunctiva] : normal sclera/conjunctiva [No JVD] : no jugular venous distention [No Lymphadenopathy] : no lymphadenopathy [Supple] : supple [Normal] : no respiratory distress, lungs were clear to auscultation bilaterally and no accessory muscle use [Tachycardia] : tachycardic [Rhythm Regular] : regular [Normal S1] : normal S1 [Normal S2] : normal S2 [II] : a grade 2 [Soft] : abdomen soft [No Focal Deficits] : no focal deficits [de-identified] : trace edema LEs b/l. [de-identified] : Anxious

## 2023-06-06 ENCOUNTER — APPOINTMENT (OUTPATIENT)
Dept: CARDIOLOGY | Facility: CLINIC | Age: 68
End: 2023-06-06

## 2023-06-07 ENCOUNTER — RESULT CHARGE (OUTPATIENT)
Age: 68
End: 2023-06-07

## 2023-06-07 LAB
ANION GAP SERPL CALC-SCNC: 13 MMOL/L
BUN SERPL-MCNC: 16 MG/DL
CALCIUM SERPL-MCNC: 9.8 MG/DL
CHLORIDE SERPL-SCNC: 101 MMOL/L
CO2 SERPL-SCNC: 22 MMOL/L
CREAT SERPL-MCNC: 0.97 MG/DL
EGFR: 85 ML/MIN/1.73M2
GLUCOSE SERPL-MCNC: 103 MG/DL
MAGNESIUM SERPL-MCNC: 2.3 MG/DL
POTASSIUM SERPL-SCNC: 4.4 MMOL/L
SODIUM SERPL-SCNC: 136 MMOL/L
T4 FREE SERPL-MCNC: 1.7 NG/DL
TSH SERPL-ACNC: 2.47 UIU/ML

## 2023-06-08 ENCOUNTER — NON-APPOINTMENT (OUTPATIENT)
Age: 68
End: 2023-06-08

## 2023-06-08 ENCOUNTER — APPOINTMENT (OUTPATIENT)
Dept: FAMILY MEDICINE | Facility: CLINIC | Age: 68
End: 2023-06-08
Payer: MEDICARE

## 2023-06-08 VITALS
HEIGHT: 64 IN | RESPIRATION RATE: 16 BRPM | DIASTOLIC BLOOD PRESSURE: 80 MMHG | TEMPERATURE: 97.9 F | HEART RATE: 75 BPM | OXYGEN SATURATION: 98 % | WEIGHT: 188 LBS | SYSTOLIC BLOOD PRESSURE: 122 MMHG | BODY MASS INDEX: 32.1 KG/M2

## 2023-06-08 DIAGNOSIS — R00.0 TACHYCARDIA, UNSPECIFIED: ICD-10-CM

## 2023-06-08 PROCEDURE — 93000 ELECTROCARDIOGRAM COMPLETE: CPT

## 2023-06-08 PROCEDURE — 99213 OFFICE O/P EST LOW 20 MIN: CPT | Mod: 25

## 2023-06-08 NOTE — ASSESSMENT
[FreeTextEntry1] : Sinus tachycardia resolved on increased propranolol dosing.\par Continue with propanolol 60 mg twice daily.\par Recommend patient follow-up with his cardiologist for further evaluation due to tendency towards tachycardia.\par Keep well-hydrated.\par Avoid caffeine.\par Follow-up 3 months.

## 2023-06-08 NOTE — DATA REVIEWED
[FreeTextEntry1] : EKG: NSR 79 bpm. PRWP anteriorly. Artifact present as pt unable to lie still. No acute or interval change.

## 2023-06-08 NOTE — PHYSICAL EXAM
[No Acute Distress] : no acute distress [No Respiratory Distress] : no respiratory distress  [Clear to Auscultation] : lungs were clear to auscultation bilaterally [Normal Rate] : normal rate  [Regular Rhythm] : with a regular rhythm [Normal S1, S2] : normal S1 and S2 [No Murmur] : no murmur heard [No Edema] : there was no peripheral edema

## 2023-06-08 NOTE — HISTORY OF PRESENT ILLNESS
[Formal Caregiver] : formal caregiver [FreeTextEntry1] : Patient is following up on sinus tachycardia.\par Patient had sinus tachycardia 120 bpm during his routine visit 3 days ago.  He was asymptomatic.\par His propanolol was increased from 40 mg every 12 hours to 60 mg every 12 hours.  He is here today for reevaluation.\par He denies any cp or palpitations.\par

## 2023-06-15 ENCOUNTER — NON-APPOINTMENT (OUTPATIENT)
Age: 68
End: 2023-06-15

## 2023-06-15 ENCOUNTER — APPOINTMENT (OUTPATIENT)
Dept: CARDIOLOGY | Facility: CLINIC | Age: 68
End: 2023-06-15
Payer: MEDICARE

## 2023-06-15 VITALS
HEART RATE: 66 BPM | WEIGHT: 180 LBS | HEIGHT: 66 IN | DIASTOLIC BLOOD PRESSURE: 72 MMHG | OXYGEN SATURATION: 94 % | TEMPERATURE: 98.1 F | BODY MASS INDEX: 28.93 KG/M2 | SYSTOLIC BLOOD PRESSURE: 115 MMHG

## 2023-06-15 PROCEDURE — 93000 ELECTROCARDIOGRAM COMPLETE: CPT

## 2023-06-15 PROCEDURE — 99214 OFFICE O/P EST MOD 30 MIN: CPT

## 2023-06-15 RX ORDER — ATORVASTATIN CALCIUM 80 MG/1
80 TABLET, FILM COATED ORAL
Qty: 90 | Refills: 3 | Status: ACTIVE | COMMUNITY
Start: 1900-01-01 | End: 1900-01-01

## 2023-07-28 ENCOUNTER — APPOINTMENT (OUTPATIENT)
Dept: FAMILY MEDICINE | Facility: CLINIC | Age: 68
End: 2023-07-28

## 2023-07-31 RX ORDER — PROPRANOLOL HYDROCHLORIDE 60 MG/1
60 TABLET ORAL
Qty: 180 | Refills: 2 | Status: ACTIVE | COMMUNITY
Start: 2022-08-05 | End: 1900-01-01

## 2023-08-08 ENCOUNTER — LABORATORY RESULT (OUTPATIENT)
Age: 68
End: 2023-08-08

## 2023-08-16 ENCOUNTER — APPOINTMENT (OUTPATIENT)
Dept: PULMONOLOGY | Facility: CLINIC | Age: 68
End: 2023-08-16
Payer: MEDICARE

## 2023-08-16 VITALS — BODY MASS INDEX: 28.45 KG/M2 | HEIGHT: 66 IN | WEIGHT: 177 LBS

## 2023-08-16 VITALS
SYSTOLIC BLOOD PRESSURE: 124 MMHG | HEART RATE: 72 BPM | DIASTOLIC BLOOD PRESSURE: 78 MMHG | RESPIRATION RATE: 16 BRPM | OXYGEN SATURATION: 94 %

## 2023-08-16 DIAGNOSIS — Z71.89 OTHER SPECIFIED COUNSELING: ICD-10-CM

## 2023-08-16 PROCEDURE — 99214 OFFICE O/P EST MOD 30 MIN: CPT

## 2023-08-16 NOTE — ASSESSMENT
[FreeTextEntry1] : Patient with severe obstructive sleep apnea intolerant of CPAP.  Owing to his status, he does not drive and is not complaining of excessive daytime sleepiness.  Blood pressure is under control.  We will discontinue CPAP.  I do not believe he would tolerate alternative therapy such as oral appliance therapy, and certainly I would not refer him for surgical intervention such as inspire.  Follow-up here as needed.

## 2023-08-16 NOTE — HISTORY OF PRESENT ILLNESS
[TextBox_4] : 67-year-old male with intellectual disability found to have severe obstructive sleep apnea.  We titrated him to a CPAP pressure of 11 but when he received the machine he refused to use it.  He wishes to return it.  Patient's caregiver reports the patient has insomnia but without excessive daytime sleepiness.

## 2023-09-08 ENCOUNTER — APPOINTMENT (OUTPATIENT)
Dept: FAMILY MEDICINE | Facility: CLINIC | Age: 68
End: 2023-09-08
Payer: MEDICARE

## 2023-09-08 VITALS
OXYGEN SATURATION: 96 % | BODY MASS INDEX: 28.28 KG/M2 | WEIGHT: 176 LBS | SYSTOLIC BLOOD PRESSURE: 120 MMHG | DIASTOLIC BLOOD PRESSURE: 58 MMHG | HEART RATE: 79 BPM | HEIGHT: 66 IN | TEMPERATURE: 97.6 F

## 2023-09-08 DIAGNOSIS — G47.30 SLEEP APNEA, UNSPECIFIED: ICD-10-CM

## 2023-09-08 DIAGNOSIS — I25.10 ATHEROSCLEROTIC HEART DISEASE OF NATIVE CORONARY ARTERY W/OUT ANGINA PECTORIS: ICD-10-CM

## 2023-09-08 PROCEDURE — 90662 IIV NO PRSV INCREASED AG IM: CPT

## 2023-09-08 PROCEDURE — G0008: CPT

## 2023-09-08 PROCEDURE — 99214 OFFICE O/P EST MOD 30 MIN: CPT | Mod: 25

## 2023-09-10 NOTE — ASSESSMENT
[FreeTextEntry1] : Cholesterol remains elevated.  Dietary modification reviewed with patient and caretaker. Continue with wt loss efforts. CPAP equipment returned due to lack of compliance. Follow-up 2 to 3 months. Flu shot today.

## 2023-09-10 NOTE — HISTORY OF PRESENT ILLNESS
[FreeTextEntry1] : JUAN C is a 67 y.o male here for a follow up of hypertension. Patient had followed up with his cardiologist 3 months ago and atorvastatin was increased from 40 mg once daily to 80 mg once daily due to evidence of moderate CAD on CT scanning.  He also has a history of dilatation of the ascending aorta for which cardiology is following. The patient also has severe obstructive sleep apnea.  He did consult with sleep medicine.  He was given CPAP equipment to be used at 11 cm H2O, but the patient did not tolerate wearing CPAP equipment.  He refuses to use it. His HHA has been helping him to make healthier food choices and he is losing weight. He has cut back on fast foods.

## 2023-09-10 NOTE — PHYSICAL EXAM
[No Acute Distress] : no acute distress [No Respiratory Distress] : no respiratory distress  [Clear to Auscultation] : lungs were clear to auscultation bilaterally [Regular Rhythm] : with a regular rhythm [Normal Rate] : normal rate  [Normal S1, S2] : normal S1 and S2 [No Murmur] : no murmur heard [No Edema] : there was no peripheral edema [de-identified] : Anxious

## 2023-10-09 RX ORDER — FLUOXETINE HYDROCHLORIDE 20 MG/1
20 CAPSULE ORAL
Qty: 90 | Refills: 0 | Status: ACTIVE | COMMUNITY
Start: 2022-08-17 | End: 1900-01-01

## 2023-10-16 ENCOUNTER — RX RENEWAL (OUTPATIENT)
Age: 68
End: 2023-10-16

## 2023-10-16 RX ORDER — TRAZODONE HYDROCHLORIDE 50 MG/1
50 TABLET ORAL
Qty: 135 | Refills: 0 | Status: ACTIVE | COMMUNITY
Start: 2022-12-21 | End: 1900-01-01

## 2023-10-19 ENCOUNTER — RX RENEWAL (OUTPATIENT)
Age: 68
End: 2023-10-19

## 2023-10-31 RX ORDER — AMLODIPINE BESYLATE 10 MG/1
10 TABLET ORAL
Qty: 90 | Refills: 3 | Status: DISCONTINUED | COMMUNITY
End: 2023-10-31

## 2023-11-07 ENCOUNTER — APPOINTMENT (OUTPATIENT)
Dept: CARDIOLOGY | Facility: CLINIC | Age: 68
End: 2023-11-07
Payer: MEDICARE

## 2023-11-07 VITALS
BODY MASS INDEX: 26.36 KG/M2 | HEART RATE: 74 BPM | DIASTOLIC BLOOD PRESSURE: 69 MMHG | TEMPERATURE: 98.6 F | HEIGHT: 66 IN | WEIGHT: 164 LBS | OXYGEN SATURATION: 97 % | SYSTOLIC BLOOD PRESSURE: 112 MMHG

## 2023-11-07 DIAGNOSIS — I25.10 ATHEROSCLEROTIC HEART DISEASE OF NATIVE CORONARY ARTERY W/OUT ANGINA PECTORIS: ICD-10-CM

## 2023-11-07 DIAGNOSIS — R94.31 ABNORMAL ELECTROCARDIOGRAM [ECG] [EKG]: ICD-10-CM

## 2023-11-07 DIAGNOSIS — I77.810 THORACIC AORTIC ECTASIA: ICD-10-CM

## 2023-11-07 DIAGNOSIS — I47.10 SUPRAVENTRICULAR TACHYCARDIA, UNSPECIFIED: ICD-10-CM

## 2023-11-07 PROCEDURE — 93000 ELECTROCARDIOGRAM COMPLETE: CPT

## 2023-11-07 PROCEDURE — 99215 OFFICE O/P EST HI 40 MIN: CPT

## 2023-11-14 ENCOUNTER — RX RENEWAL (OUTPATIENT)
Age: 68
End: 2023-11-14

## 2023-11-14 RX ORDER — TAMSULOSIN HYDROCHLORIDE 0.4 MG/1
0.4 CAPSULE ORAL
Qty: 90 | Refills: 0 | Status: ACTIVE | COMMUNITY
Start: 2022-02-11 | End: 1900-01-01

## 2023-11-30 ENCOUNTER — APPOINTMENT (OUTPATIENT)
Dept: FAMILY MEDICINE | Facility: CLINIC | Age: 68
End: 2023-11-30
Payer: MEDICARE

## 2023-11-30 VITALS
SYSTOLIC BLOOD PRESSURE: 118 MMHG | OXYGEN SATURATION: 98 % | BODY MASS INDEX: 26.68 KG/M2 | TEMPERATURE: 97.6 F | HEIGHT: 66 IN | HEART RATE: 76 BPM | DIASTOLIC BLOOD PRESSURE: 62 MMHG | WEIGHT: 166 LBS

## 2023-11-30 DIAGNOSIS — R73.03 PREDIABETES.: ICD-10-CM

## 2023-11-30 DIAGNOSIS — R26.81 UNSTEADINESS ON FEET: ICD-10-CM

## 2023-11-30 DIAGNOSIS — I10 ESSENTIAL (PRIMARY) HYPERTENSION: ICD-10-CM

## 2023-11-30 DIAGNOSIS — E78.5 HYPERLIPIDEMIA, UNSPECIFIED: ICD-10-CM

## 2023-11-30 DIAGNOSIS — N40.0 BENIGN PROSTATIC HYPERPLASIA WITHOUT LOWER URINARY TRACT SYMPMS: ICD-10-CM

## 2023-11-30 DIAGNOSIS — E55.9 VITAMIN D DEFICIENCY, UNSPECIFIED: ICD-10-CM

## 2023-11-30 PROCEDURE — 99214 OFFICE O/P EST MOD 30 MIN: CPT

## 2023-12-04 ENCOUNTER — LABORATORY RESULT (OUTPATIENT)
Age: 68
End: 2023-12-04

## 2023-12-05 ENCOUNTER — INPATIENT (INPATIENT)
Facility: HOSPITAL | Age: 68
LOS: 2 days | Discharge: NURSING, REHAB OR LONG-TERM CARE | DRG: 641 | End: 2023-12-08
Attending: STUDENT IN AN ORGANIZED HEALTH CARE EDUCATION/TRAINING PROGRAM | Admitting: HOSPITALIST
Payer: MEDICARE

## 2023-12-05 VITALS
TEMPERATURE: 98 F | WEIGHT: 173.94 LBS | SYSTOLIC BLOOD PRESSURE: 81 MMHG | HEIGHT: 66 IN | OXYGEN SATURATION: 98 % | HEART RATE: 71 BPM | RESPIRATION RATE: 20 BRPM | DIASTOLIC BLOOD PRESSURE: 47 MMHG

## 2023-12-05 DIAGNOSIS — R79.89 OTHER SPECIFIED ABNORMAL FINDINGS OF BLOOD CHEMISTRY: ICD-10-CM

## 2023-12-05 LAB
ACETONE SERPL-MCNC: NEGATIVE — SIGNIFICANT CHANGE UP
ACETONE SERPL-MCNC: NEGATIVE — SIGNIFICANT CHANGE UP
ALBUMIN SERPL ELPH-MCNC: 3.2 G/DL — LOW (ref 3.3–5.2)
ALBUMIN SERPL ELPH-MCNC: 3.2 G/DL — LOW (ref 3.3–5.2)
ALP SERPL-CCNC: 130 U/L — HIGH (ref 40–120)
ALP SERPL-CCNC: 130 U/L — HIGH (ref 40–120)
ALT FLD-CCNC: 21 U/L — SIGNIFICANT CHANGE UP
ALT FLD-CCNC: 21 U/L — SIGNIFICANT CHANGE UP
ANION GAP SERPL CALC-SCNC: 14 MMOL/L — SIGNIFICANT CHANGE UP (ref 5–17)
ANION GAP SERPL CALC-SCNC: 14 MMOL/L — SIGNIFICANT CHANGE UP (ref 5–17)
APPEARANCE UR: CLEAR — SIGNIFICANT CHANGE UP
APPEARANCE UR: CLEAR — SIGNIFICANT CHANGE UP
APTT BLD: 24.7 SEC — SIGNIFICANT CHANGE UP (ref 24.5–35.6)
APTT BLD: 24.7 SEC — SIGNIFICANT CHANGE UP (ref 24.5–35.6)
AST SERPL-CCNC: 19 U/L — SIGNIFICANT CHANGE UP
AST SERPL-CCNC: 19 U/L — SIGNIFICANT CHANGE UP
BASE EXCESS BLDV CALC-SCNC: 0.9 MMOL/L — SIGNIFICANT CHANGE UP (ref -2–3)
BASE EXCESS BLDV CALC-SCNC: 0.9 MMOL/L — SIGNIFICANT CHANGE UP (ref -2–3)
BASE EXCESS BLDV CALC-SCNC: 1.7 MMOL/L — SIGNIFICANT CHANGE UP (ref -2–3)
BASE EXCESS BLDV CALC-SCNC: 1.7 MMOL/L — SIGNIFICANT CHANGE UP (ref -2–3)
BASOPHILS # BLD AUTO: 0.03 K/UL — SIGNIFICANT CHANGE UP (ref 0–0.2)
BASOPHILS # BLD AUTO: 0.03 K/UL — SIGNIFICANT CHANGE UP (ref 0–0.2)
BASOPHILS NFR BLD AUTO: 0.2 % — SIGNIFICANT CHANGE UP (ref 0–2)
BASOPHILS NFR BLD AUTO: 0.2 % — SIGNIFICANT CHANGE UP (ref 0–2)
BILIRUB SERPL-MCNC: 0.6 MG/DL — SIGNIFICANT CHANGE UP (ref 0.4–2)
BILIRUB SERPL-MCNC: 0.6 MG/DL — SIGNIFICANT CHANGE UP (ref 0.4–2)
BILIRUB UR-MCNC: NEGATIVE — SIGNIFICANT CHANGE UP
BILIRUB UR-MCNC: NEGATIVE — SIGNIFICANT CHANGE UP
BUN SERPL-MCNC: 27.7 MG/DL — HIGH (ref 8–20)
BUN SERPL-MCNC: 27.7 MG/DL — HIGH (ref 8–20)
CA-I SERPL-SCNC: 1.09 MMOL/L — LOW (ref 1.15–1.33)
CA-I SERPL-SCNC: 1.09 MMOL/L — LOW (ref 1.15–1.33)
CA-I SERPL-SCNC: 1.16 MMOL/L — SIGNIFICANT CHANGE UP (ref 1.15–1.33)
CA-I SERPL-SCNC: 1.16 MMOL/L — SIGNIFICANT CHANGE UP (ref 1.15–1.33)
CALCIUM SERPL-MCNC: 8.8 MG/DL — SIGNIFICANT CHANGE UP (ref 8.4–10.5)
CALCIUM SERPL-MCNC: 8.8 MG/DL — SIGNIFICANT CHANGE UP (ref 8.4–10.5)
CHLORIDE BLDV-SCNC: 98 MMOL/L — SIGNIFICANT CHANGE UP (ref 96–108)
CHLORIDE BLDV-SCNC: 98 MMOL/L — SIGNIFICANT CHANGE UP (ref 96–108)
CHLORIDE BLDV-SCNC: 99 MMOL/L — SIGNIFICANT CHANGE UP (ref 96–108)
CHLORIDE BLDV-SCNC: 99 MMOL/L — SIGNIFICANT CHANGE UP (ref 96–108)
CHLORIDE SERPL-SCNC: 94 MMOL/L — LOW (ref 96–108)
CHLORIDE SERPL-SCNC: 94 MMOL/L — LOW (ref 96–108)
CO2 SERPL-SCNC: 23 MMOL/L — SIGNIFICANT CHANGE UP (ref 22–29)
CO2 SERPL-SCNC: 23 MMOL/L — SIGNIFICANT CHANGE UP (ref 22–29)
COLOR SPEC: YELLOW — SIGNIFICANT CHANGE UP
COLOR SPEC: YELLOW — SIGNIFICANT CHANGE UP
CREAT SERPL-MCNC: 1.16 MG/DL — SIGNIFICANT CHANGE UP (ref 0.5–1.3)
CREAT SERPL-MCNC: 1.16 MG/DL — SIGNIFICANT CHANGE UP (ref 0.5–1.3)
DIFF PNL FLD: NEGATIVE — SIGNIFICANT CHANGE UP
DIFF PNL FLD: NEGATIVE — SIGNIFICANT CHANGE UP
EGFR: 68 ML/MIN/1.73M2 — SIGNIFICANT CHANGE UP
EGFR: 68 ML/MIN/1.73M2 — SIGNIFICANT CHANGE UP
EOSINOPHIL # BLD AUTO: 0.11 K/UL — SIGNIFICANT CHANGE UP (ref 0–0.5)
EOSINOPHIL # BLD AUTO: 0.11 K/UL — SIGNIFICANT CHANGE UP (ref 0–0.5)
EOSINOPHIL NFR BLD AUTO: 0.9 % — SIGNIFICANT CHANGE UP (ref 0–6)
EOSINOPHIL NFR BLD AUTO: 0.9 % — SIGNIFICANT CHANGE UP (ref 0–6)
FLUAV AG NPH QL: SIGNIFICANT CHANGE UP
FLUAV AG NPH QL: SIGNIFICANT CHANGE UP
FLUBV AG NPH QL: SIGNIFICANT CHANGE UP
FLUBV AG NPH QL: SIGNIFICANT CHANGE UP
GAS PNL BLDV: 129 MMOL/L — LOW (ref 136–145)
GAS PNL BLDV: 129 MMOL/L — LOW (ref 136–145)
GAS PNL BLDV: 131 MMOL/L — LOW (ref 136–145)
GAS PNL BLDV: 131 MMOL/L — LOW (ref 136–145)
GAS PNL BLDV: SIGNIFICANT CHANGE UP
GLUCOSE BLDC GLUCOMTR-MCNC: 96 MG/DL — SIGNIFICANT CHANGE UP (ref 70–99)
GLUCOSE BLDC GLUCOMTR-MCNC: 96 MG/DL — SIGNIFICANT CHANGE UP (ref 70–99)
GLUCOSE BLDV-MCNC: 184 MG/DL — HIGH (ref 70–99)
GLUCOSE BLDV-MCNC: 184 MG/DL — HIGH (ref 70–99)
GLUCOSE BLDV-MCNC: 190 MG/DL — HIGH (ref 70–99)
GLUCOSE BLDV-MCNC: 190 MG/DL — HIGH (ref 70–99)
GLUCOSE SERPL-MCNC: 179 MG/DL — HIGH (ref 70–99)
GLUCOSE SERPL-MCNC: 179 MG/DL — HIGH (ref 70–99)
GLUCOSE UR QL: NEGATIVE MG/DL — SIGNIFICANT CHANGE UP
GLUCOSE UR QL: NEGATIVE MG/DL — SIGNIFICANT CHANGE UP
HCO3 BLDV-SCNC: 25 MMOL/L — SIGNIFICANT CHANGE UP (ref 22–29)
HCO3 BLDV-SCNC: 25 MMOL/L — SIGNIFICANT CHANGE UP (ref 22–29)
HCO3 BLDV-SCNC: 27 MMOL/L — SIGNIFICANT CHANGE UP (ref 22–29)
HCO3 BLDV-SCNC: 27 MMOL/L — SIGNIFICANT CHANGE UP (ref 22–29)
HCT VFR BLD CALC: 38.1 % — LOW (ref 39–50)
HCT VFR BLD CALC: 38.1 % — LOW (ref 39–50)
HCT VFR BLDA CALC: 37 % — SIGNIFICANT CHANGE UP
HCT VFR BLDA CALC: 37 % — SIGNIFICANT CHANGE UP
HCT VFR BLDA CALC: 38 % — SIGNIFICANT CHANGE UP
HCT VFR BLDA CALC: 38 % — SIGNIFICANT CHANGE UP
HGB BLD CALC-MCNC: 12.2 G/DL — LOW (ref 12.6–17.4)
HGB BLD CALC-MCNC: 12.2 G/DL — LOW (ref 12.6–17.4)
HGB BLD CALC-MCNC: 12.8 G/DL — SIGNIFICANT CHANGE UP (ref 12.6–17.4)
HGB BLD CALC-MCNC: 12.8 G/DL — SIGNIFICANT CHANGE UP (ref 12.6–17.4)
HGB BLD-MCNC: 12.9 G/DL — LOW (ref 13–17)
HGB BLD-MCNC: 12.9 G/DL — LOW (ref 13–17)
IMM GRANULOCYTES NFR BLD AUTO: 0.5 % — SIGNIFICANT CHANGE UP (ref 0–0.9)
IMM GRANULOCYTES NFR BLD AUTO: 0.5 % — SIGNIFICANT CHANGE UP (ref 0–0.9)
INR BLD: 1.08 RATIO — SIGNIFICANT CHANGE UP (ref 0.85–1.18)
INR BLD: 1.08 RATIO — SIGNIFICANT CHANGE UP (ref 0.85–1.18)
KETONES UR-MCNC: NEGATIVE MG/DL — SIGNIFICANT CHANGE UP
KETONES UR-MCNC: NEGATIVE MG/DL — SIGNIFICANT CHANGE UP
LACTATE BLDV-MCNC: 1.7 MMOL/L — SIGNIFICANT CHANGE UP (ref 0.5–2)
LACTATE BLDV-MCNC: 1.7 MMOL/L — SIGNIFICANT CHANGE UP (ref 0.5–2)
LACTATE BLDV-MCNC: 2.4 MMOL/L — HIGH (ref 0.5–2)
LACTATE BLDV-MCNC: 2.4 MMOL/L — HIGH (ref 0.5–2)
LEUKOCYTE ESTERASE UR-ACNC: NEGATIVE — SIGNIFICANT CHANGE UP
LEUKOCYTE ESTERASE UR-ACNC: NEGATIVE — SIGNIFICANT CHANGE UP
LIDOCAIN IGE QN: 36 U/L — SIGNIFICANT CHANGE UP (ref 22–51)
LIDOCAIN IGE QN: 36 U/L — SIGNIFICANT CHANGE UP (ref 22–51)
LYMPHOCYTES # BLD AUTO: 0.93 K/UL — LOW (ref 1–3.3)
LYMPHOCYTES # BLD AUTO: 0.93 K/UL — LOW (ref 1–3.3)
LYMPHOCYTES # BLD AUTO: 7.7 % — LOW (ref 13–44)
LYMPHOCYTES # BLD AUTO: 7.7 % — LOW (ref 13–44)
MCHC RBC-ENTMCNC: 30.4 PG — SIGNIFICANT CHANGE UP (ref 27–34)
MCHC RBC-ENTMCNC: 30.4 PG — SIGNIFICANT CHANGE UP (ref 27–34)
MCHC RBC-ENTMCNC: 33.9 GM/DL — SIGNIFICANT CHANGE UP (ref 32–36)
MCHC RBC-ENTMCNC: 33.9 GM/DL — SIGNIFICANT CHANGE UP (ref 32–36)
MCV RBC AUTO: 89.9 FL — SIGNIFICANT CHANGE UP (ref 80–100)
MCV RBC AUTO: 89.9 FL — SIGNIFICANT CHANGE UP (ref 80–100)
MONOCYTES # BLD AUTO: 0.98 K/UL — HIGH (ref 0–0.9)
MONOCYTES # BLD AUTO: 0.98 K/UL — HIGH (ref 0–0.9)
MONOCYTES NFR BLD AUTO: 8.2 % — SIGNIFICANT CHANGE UP (ref 2–14)
MONOCYTES NFR BLD AUTO: 8.2 % — SIGNIFICANT CHANGE UP (ref 2–14)
NEUTROPHILS # BLD AUTO: 9.9 K/UL — HIGH (ref 1.8–7.4)
NEUTROPHILS # BLD AUTO: 9.9 K/UL — HIGH (ref 1.8–7.4)
NEUTROPHILS NFR BLD AUTO: 82.5 % — HIGH (ref 43–77)
NEUTROPHILS NFR BLD AUTO: 82.5 % — HIGH (ref 43–77)
NITRITE UR-MCNC: NEGATIVE — SIGNIFICANT CHANGE UP
NITRITE UR-MCNC: NEGATIVE — SIGNIFICANT CHANGE UP
PCO2 BLDV: 38 MMHG — LOW (ref 42–55)
PCO2 BLDV: 38 MMHG — LOW (ref 42–55)
PCO2 BLDV: 48 MMHG — SIGNIFICANT CHANGE UP (ref 42–55)
PCO2 BLDV: 48 MMHG — SIGNIFICANT CHANGE UP (ref 42–55)
PH BLDV: 7.36 — SIGNIFICANT CHANGE UP (ref 7.32–7.43)
PH BLDV: 7.36 — SIGNIFICANT CHANGE UP (ref 7.32–7.43)
PH BLDV: 7.43 — SIGNIFICANT CHANGE UP (ref 7.32–7.43)
PH BLDV: 7.43 — SIGNIFICANT CHANGE UP (ref 7.32–7.43)
PH UR: 6.5 — SIGNIFICANT CHANGE UP (ref 5–8)
PH UR: 6.5 — SIGNIFICANT CHANGE UP (ref 5–8)
PLATELET # BLD AUTO: 359 K/UL — SIGNIFICANT CHANGE UP (ref 150–400)
PLATELET # BLD AUTO: 359 K/UL — SIGNIFICANT CHANGE UP (ref 150–400)
PO2 BLDV: 139 MMHG — HIGH (ref 25–45)
PO2 BLDV: 139 MMHG — HIGH (ref 25–45)
PO2 BLDV: 58 MMHG — HIGH (ref 25–45)
PO2 BLDV: 58 MMHG — HIGH (ref 25–45)
POTASSIUM BLDV-SCNC: 3.4 MMOL/L — LOW (ref 3.5–5.1)
POTASSIUM BLDV-SCNC: 3.4 MMOL/L — LOW (ref 3.5–5.1)
POTASSIUM BLDV-SCNC: 4.1 MMOL/L — SIGNIFICANT CHANGE UP (ref 3.5–5.1)
POTASSIUM BLDV-SCNC: 4.1 MMOL/L — SIGNIFICANT CHANGE UP (ref 3.5–5.1)
POTASSIUM SERPL-MCNC: 3.5 MMOL/L — SIGNIFICANT CHANGE UP (ref 3.5–5.3)
POTASSIUM SERPL-MCNC: 3.5 MMOL/L — SIGNIFICANT CHANGE UP (ref 3.5–5.3)
POTASSIUM SERPL-SCNC: 3.5 MMOL/L — SIGNIFICANT CHANGE UP (ref 3.5–5.3)
POTASSIUM SERPL-SCNC: 3.5 MMOL/L — SIGNIFICANT CHANGE UP (ref 3.5–5.3)
PROT SERPL-MCNC: 6.5 G/DL — LOW (ref 6.6–8.7)
PROT SERPL-MCNC: 6.5 G/DL — LOW (ref 6.6–8.7)
PROT UR-MCNC: NEGATIVE MG/DL — SIGNIFICANT CHANGE UP
PROT UR-MCNC: NEGATIVE MG/DL — SIGNIFICANT CHANGE UP
PROTHROM AB SERPL-ACNC: 12 SEC — SIGNIFICANT CHANGE UP (ref 9.5–13)
PROTHROM AB SERPL-ACNC: 12 SEC — SIGNIFICANT CHANGE UP (ref 9.5–13)
RBC # BLD: 4.24 M/UL — SIGNIFICANT CHANGE UP (ref 4.2–5.8)
RBC # BLD: 4.24 M/UL — SIGNIFICANT CHANGE UP (ref 4.2–5.8)
RBC # FLD: 14.5 % — SIGNIFICANT CHANGE UP (ref 10.3–14.5)
RBC # FLD: 14.5 % — SIGNIFICANT CHANGE UP (ref 10.3–14.5)
RSV RNA NPH QL NAA+NON-PROBE: SIGNIFICANT CHANGE UP
RSV RNA NPH QL NAA+NON-PROBE: SIGNIFICANT CHANGE UP
SAO2 % BLDV: 86.2 % — SIGNIFICANT CHANGE UP
SAO2 % BLDV: 86.2 % — SIGNIFICANT CHANGE UP
SAO2 % BLDV: 99.9 % — SIGNIFICANT CHANGE UP
SAO2 % BLDV: 99.9 % — SIGNIFICANT CHANGE UP
SARS-COV-2 RNA SPEC QL NAA+PROBE: SIGNIFICANT CHANGE UP
SARS-COV-2 RNA SPEC QL NAA+PROBE: SIGNIFICANT CHANGE UP
SODIUM SERPL-SCNC: 131 MMOL/L — LOW (ref 135–145)
SODIUM SERPL-SCNC: 131 MMOL/L — LOW (ref 135–145)
SP GR SPEC: 1.01 — SIGNIFICANT CHANGE UP (ref 1–1.03)
SP GR SPEC: 1.01 — SIGNIFICANT CHANGE UP (ref 1–1.03)
TROPONIN T, HIGH SENSITIVITY RESULT: 27 NG/L — SIGNIFICANT CHANGE UP (ref 0–51)
TROPONIN T, HIGH SENSITIVITY RESULT: 27 NG/L — SIGNIFICANT CHANGE UP (ref 0–51)
TROPONIN T, HIGH SENSITIVITY RESULT: 37 NG/L — SIGNIFICANT CHANGE UP (ref 0–51)
TROPONIN T, HIGH SENSITIVITY RESULT: 37 NG/L — SIGNIFICANT CHANGE UP (ref 0–51)
UROBILINOGEN FLD QL: 1 MG/DL — SIGNIFICANT CHANGE UP (ref 0.2–1)
UROBILINOGEN FLD QL: 1 MG/DL — SIGNIFICANT CHANGE UP (ref 0.2–1)
WBC # BLD: 12.01 K/UL — HIGH (ref 3.8–10.5)
WBC # BLD: 12.01 K/UL — HIGH (ref 3.8–10.5)
WBC # FLD AUTO: 12.01 K/UL — HIGH (ref 3.8–10.5)
WBC # FLD AUTO: 12.01 K/UL — HIGH (ref 3.8–10.5)

## 2023-12-05 PROCEDURE — 99222 1ST HOSP IP/OBS MODERATE 55: CPT

## 2023-12-05 PROCEDURE — 70450 CT HEAD/BRAIN W/O DYE: CPT | Mod: 26,MA

## 2023-12-05 PROCEDURE — 71045 X-RAY EXAM CHEST 1 VIEW: CPT | Mod: 26

## 2023-12-05 PROCEDURE — 99285 EMERGENCY DEPT VISIT HI MDM: CPT

## 2023-12-05 RX ORDER — TRAZODONE HCL 50 MG
1.5 TABLET ORAL
Refills: 0 | DISCHARGE

## 2023-12-05 RX ORDER — ONDANSETRON 8 MG/1
4 TABLET, FILM COATED ORAL EVERY 8 HOURS
Refills: 0 | Status: DISCONTINUED | OUTPATIENT
Start: 2023-12-05 | End: 2023-12-08

## 2023-12-05 RX ORDER — INSULIN LISPRO 100/ML
2 VIAL (ML) SUBCUTANEOUS
Refills: 0 | Status: DISCONTINUED | OUTPATIENT
Start: 2023-12-05 | End: 2023-12-06

## 2023-12-05 RX ORDER — DEXTROSE 50 % IN WATER 50 %
25 SYRINGE (ML) INTRAVENOUS ONCE
Refills: 0 | Status: DISCONTINUED | OUTPATIENT
Start: 2023-12-05 | End: 2023-12-08

## 2023-12-05 RX ORDER — INSULIN LISPRO 100/ML
VIAL (ML) SUBCUTANEOUS
Refills: 0 | Status: DISCONTINUED | OUTPATIENT
Start: 2023-12-05 | End: 2023-12-08

## 2023-12-05 RX ORDER — FLUOXETINE HCL 10 MG
20 CAPSULE ORAL DAILY
Refills: 0 | Status: DISCONTINUED | OUTPATIENT
Start: 2023-12-05 | End: 2023-12-08

## 2023-12-05 RX ORDER — ASPIRIN/CALCIUM CARB/MAGNESIUM 324 MG
1 TABLET ORAL
Refills: 0 | DISCHARGE

## 2023-12-05 RX ORDER — SODIUM CHLORIDE 9 MG/ML
1000 INJECTION, SOLUTION INTRAVENOUS
Refills: 0 | Status: DISCONTINUED | OUTPATIENT
Start: 2023-12-05 | End: 2023-12-08

## 2023-12-05 RX ORDER — ACETAMINOPHEN 500 MG
650 TABLET ORAL EVERY 6 HOURS
Refills: 0 | Status: DISCONTINUED | OUTPATIENT
Start: 2023-12-05 | End: 2023-12-08

## 2023-12-05 RX ORDER — PROPRANOLOL HCL 160 MG
1 CAPSULE, EXTENDED RELEASE 24HR ORAL
Refills: 0 | DISCHARGE

## 2023-12-05 RX ORDER — AMLODIPINE BESYLATE 2.5 MG/1
1 TABLET ORAL
Qty: 0 | Refills: 0 | DISCHARGE

## 2023-12-05 RX ORDER — TRAZODONE HCL 50 MG
75 TABLET ORAL AT BEDTIME
Refills: 0 | Status: DISCONTINUED | OUTPATIENT
Start: 2023-12-05 | End: 2023-12-08

## 2023-12-05 RX ORDER — SODIUM CHLORIDE 9 MG/ML
1000 INJECTION INTRAMUSCULAR; INTRAVENOUS; SUBCUTANEOUS
Refills: 0 | Status: DISCONTINUED | OUTPATIENT
Start: 2023-12-05 | End: 2023-12-08

## 2023-12-05 RX ORDER — FLUOXETINE HCL 10 MG
1 CAPSULE ORAL
Refills: 0 | DISCHARGE

## 2023-12-05 RX ORDER — ATORVASTATIN CALCIUM 80 MG/1
80 TABLET, FILM COATED ORAL AT BEDTIME
Refills: 0 | Status: DISCONTINUED | OUTPATIENT
Start: 2023-12-05 | End: 2023-12-08

## 2023-12-05 RX ORDER — TAMSULOSIN HYDROCHLORIDE 0.4 MG/1
0.4 CAPSULE ORAL AT BEDTIME
Refills: 0 | Status: DISCONTINUED | OUTPATIENT
Start: 2023-12-05 | End: 2023-12-08

## 2023-12-05 RX ORDER — SODIUM CHLORIDE 9 MG/ML
1000 INJECTION, SOLUTION INTRAVENOUS ONCE
Refills: 0 | Status: COMPLETED | OUTPATIENT
Start: 2023-12-05 | End: 2023-12-05

## 2023-12-05 RX ORDER — GLUCAGON INJECTION, SOLUTION 0.5 MG/.1ML
1 INJECTION, SOLUTION SUBCUTANEOUS ONCE
Refills: 0 | Status: DISCONTINUED | OUTPATIENT
Start: 2023-12-05 | End: 2023-12-08

## 2023-12-05 RX ORDER — DEXTROSE 50 % IN WATER 50 %
15 SYRINGE (ML) INTRAVENOUS ONCE
Refills: 0 | Status: DISCONTINUED | OUTPATIENT
Start: 2023-12-05 | End: 2023-12-08

## 2023-12-05 RX ORDER — ATORVASTATIN CALCIUM 80 MG/1
1 TABLET, FILM COATED ORAL
Refills: 0 | DISCHARGE

## 2023-12-05 RX ORDER — ASPIRIN/CALCIUM CARB/MAGNESIUM 324 MG
81 TABLET ORAL DAILY
Refills: 0 | Status: DISCONTINUED | OUTPATIENT
Start: 2023-12-05 | End: 2023-12-08

## 2023-12-05 RX ORDER — LANOLIN ALCOHOL/MO/W.PET/CERES
3 CREAM (GRAM) TOPICAL AT BEDTIME
Refills: 0 | Status: DISCONTINUED | OUTPATIENT
Start: 2023-12-05 | End: 2023-12-08

## 2023-12-05 RX ORDER — HEPARIN SODIUM 5000 [USP'U]/ML
5000 INJECTION INTRAVENOUS; SUBCUTANEOUS EVERY 12 HOURS
Refills: 0 | Status: DISCONTINUED | OUTPATIENT
Start: 2023-12-05 | End: 2023-12-08

## 2023-12-05 RX ORDER — SODIUM CHLORIDE 9 MG/ML
3 INJECTION INTRAMUSCULAR; INTRAVENOUS; SUBCUTANEOUS EVERY 8 HOURS
Refills: 0 | Status: DISCONTINUED | OUTPATIENT
Start: 2023-12-05 | End: 2023-12-08

## 2023-12-05 RX ORDER — TAMSULOSIN HYDROCHLORIDE 0.4 MG/1
1 CAPSULE ORAL
Refills: 0 | DISCHARGE

## 2023-12-05 RX ORDER — INSULIN GLARGINE 100 [IU]/ML
10 INJECTION, SOLUTION SUBCUTANEOUS AT BEDTIME
Refills: 0 | Status: DISCONTINUED | OUTPATIENT
Start: 2023-12-05 | End: 2023-12-06

## 2023-12-05 RX ADMIN — SODIUM CHLORIDE 1000 MILLILITER(S): 9 INJECTION, SOLUTION INTRAVENOUS at 12:27

## 2023-12-05 RX ADMIN — SODIUM CHLORIDE 3 MILLILITER(S): 9 INJECTION INTRAMUSCULAR; INTRAVENOUS; SUBCUTANEOUS at 22:19

## 2023-12-05 RX ADMIN — SODIUM CHLORIDE 1000 MILLILITER(S): 9 INJECTION, SOLUTION INTRAVENOUS at 11:14

## 2023-12-05 RX ADMIN — Medication 75 MILLIGRAM(S): at 22:05

## 2023-12-05 RX ADMIN — TAMSULOSIN HYDROCHLORIDE 0.4 MILLIGRAM(S): 0.4 CAPSULE ORAL at 22:04

## 2023-12-05 RX ADMIN — SODIUM CHLORIDE 125 MILLILITER(S): 9 INJECTION INTRAMUSCULAR; INTRAVENOUS; SUBCUTANEOUS at 22:14

## 2023-12-05 RX ADMIN — ATORVASTATIN CALCIUM 80 MILLIGRAM(S): 80 TABLET, FILM COATED ORAL at 22:04

## 2023-12-05 NOTE — ED ADULT NURSE REASSESSMENT NOTE - NS ED NURSE REASSESS COMMENT FT1
Assumed care of pt from  RN Rakel: Pt resting calm and comfortable In stretcher. Endorsing weakness x1 week. Unable to ambulate at this time. Resp. equal and unlabored b/l. VSS. NAD. Comfort measures provided, safety measures implemented, call bell in reach. Aid at bedside.

## 2023-12-05 NOTE — ED ADULT NURSE NOTE - OBJECTIVE STATEMENT
68 yom with pmhx of tbi, had a trip and fall  on  __. seen and treated by md at home with no reccommendation to go for evaluation. pt  presents today to ED with increased weakness with difficulty with controlling urination/ bm/ generalized malaise. denies any known  sick contacts. afebrile. denies pain. pt is at baseline mentation according to 24/7 aide.

## 2023-12-05 NOTE — ED ADULT NURSE NOTE - NSFALLHARMRISKINTERV_ED_ALL_ED
Assistance OOB with selected safe patient handling equipment if applicable/Assistance with ambulation/Communicate risk of Fall with Harm to all staff, patient, and family/Encourage patient to sit up slowly, dangle for a short time, stand at bedside before walking/Monitor gait and stability/Monitor for mental status changes and reorient to person, place, and time, as needed/Move patient closer to nursing station/within visual sight of ED staff/Orthostatic vital signs/Provide patient with walking aids/Provide visual cue: red socks, yellow wristband, yellow gown, etc/Reinforce activity limits and safety measures with patient and family/Toileting schedule using arm’s reach rule for commode and bathroom/Use of alarms - bed, stretcher, chair and/or video monitoring/Bed in lowest position, wheels locked, appropriate side rails in place/Call bell, personal items and telephone in reach/Instruct patient to call for assistance before getting out of bed/chair/stretcher/Non-slip footwear applied when patient is off stretcher/[place] to call system/Physically safe environment - no spills, clutter or unnecessary equipment/Purposeful Proactive Rounding/Room/bathroom lighting operational, light cord in reach Assistance OOB with selected safe patient handling equipment if applicable/Assistance with ambulation/Communicate risk of Fall with Harm to all staff, patient, and family/Encourage patient to sit up slowly, dangle for a short time, stand at bedside before walking/Monitor gait and stability/Monitor for mental status changes and reorient to person, place, and time, as needed/Move patient closer to nursing station/within visual sight of ED staff/Orthostatic vital signs/Provide patient with walking aids/Provide visual cue: red socks, yellow wristband, yellow gown, etc/Reinforce activity limits and safety measures with patient and family/Toileting schedule using arm’s reach rule for commode and bathroom/Use of alarms - bed, stretcher, chair and/or video monitoring/Bed in lowest position, wheels locked, appropriate side rails in place/Call bell, personal items and telephone in reach/Instruct patient to call for assistance before getting out of bed/chair/stretcher/Non-slip footwear applied when patient is off stretcher/[place] to call system/Physically safe environment - no spills, clutter or unnecessary equipment/Purposeful Proactive Rounding/Room/bathroom lighting operational, light cord in reach

## 2023-12-05 NOTE — ED PROVIDER NOTE - CLINICAL SUMMARY MEDICAL DECISION MAKING FREE TEXT BOX
68-year-old male with history of HTN, HLD, TBI presenting from home with generalized weakness. Patient afebrile with normal heart rate in triage.  Initial BP 80 systolic x 2 however improved to 100 systolic in critical care area.  He is ANO x 4 without any focal neurodeficits.  Only complaint is generalized weakness and home health aide reports that he fell about a week and a half ago.  Sugar elevated to 300 in triage and there is no history of diabetes.  Will send DKA workup, evaluate for infection, and sent for CT head to evaluate for issues with gait.   differential includes UTI, viral syndrome, DKA, intracranial bleed, dehydration.

## 2023-12-05 NOTE — H&P ADULT - ASSESSMENT
67 y/o male with new onset DM-2, Hypotension, Mild trop. elevation, hx of TBI, HTN, HLD, PH    New Onset DM-2:  -No hx of DM reported, not on medications for it  -Symptoms reported c/w hyperglycemia and likely has diuretic effet from hyperglycemia causing dehydration/hypotension  -No evidence of HHS/DKA  -Start on ADA diet  -Start on basal bolus regimen with lantus/Admelog  -Accu checks AC/HS with coverage  -Check A1c  -Diabetic teaching, o/p eye exam  -OOB, ambulate with walker, fall risk  -DVT-P w/ Sub Q heparin     Hypotension:  -Due to dehydration and being on multiple BP meds  -Cont. to hold all BP meds  -Cont. IVF  -Mild lactate elevation due to hypotension  -No evidence of infectious process/sepsis  -No indication for Abx at this time     Mild. Troponin elevation:  -Due to supply demand mismatch from hypotension  -No CP, EKG pending  -Monitor on tele for 24 hrs    TBI:  -No Dysphagia reported  -Uses walker at baseline  -Cont. o/p regimen     HTN:  -Hold BP meds as above  -Resume DASH diet on DC    HLD:  -Statin     PH:  -Cont. Fomax  -Denies LUTs    Discussed with ED staff, Patient  69 y/o male with new onset DM-2, Hypotension, Mild trop. elevation, hx of TBI, HTN, HLD, PH    New Onset DM-2:  -No hx of DM reported, not on medications for it  -Symptoms reported c/w hyperglycemia and likely has diuretic effet from hyperglycemia causing dehydration/hypotension  -No evidence of HHS/DKA  -Start on ADA diet  -Start on basal bolus regimen with lantus/Admelog  -Accu checks AC/HS with coverage  -Check A1c  -Diabetic teaching, o/p eye exam  -OOB, ambulate with walker, fall risk  -DVT-P w/ Sub Q heparin     Hypotension:  -Due to dehydration and being on multiple BP meds  -Cont. to hold all BP meds  -Cont. IVF  -Mild lactate elevation due to hypotension  -No evidence of infectious process/sepsis  -No indication for Abx at this time     Mild. Troponin elevation:  -Due to supply demand mismatch from hypotension  -No CP, EKG pending  -Monitor on tele for 24 hrs    TBI:  -No Dysphagia reported  -Uses walker at baseline  -Cont. o/p regimen     HTN:  -Hold BP meds as above  -Resume DASH diet on DC    HLD:  -Statin     PH:  -Cont. Fomax  -Denies LUTs    Discussed with ED staff, Patient

## 2023-12-05 NOTE — ED PROVIDER NOTE - OBJECTIVE STATEMENT
68-year-old male with history of HTN, HLD, TBI presenting from home with generalized weakness.  Patient lives with 24-hour aide.  Over the past week and a half he has been weaker than normal and unbalanced when he walks.  But a week and a half ago he did fall.  No reported LOC and is not on anticoagulation.  Patient currently denies complaints at this time.  Blood sugar at triage was 377 and patient does not have a history of diabetes.  Patient also noted to be hypotensive to 80 systolic x 2 at triage.  Patient brought to critical care area. 68-year-old male with history of HTN, HLD, TBI presenting from home with generalized weakness.  Patient lives with 24-hour aide.  Over the past week and a half he has been weaker than normal and unbalanced when he walks.  But a week and a half ago he did fall.  No reported LOC and is not on anticoagulation.  Patient currently denies complaints at this time.  Blood sugar at triage was 377 and patient does not have a history of diabetes.  Patient also noted to be hypotensive to 80 systolic x 2 at triage.  Patient brought to critical care area.    Mike (Emergency contact) [phone] 68-year-old male with history of HTN, HLD, TBI presenting from home with generalized weakness.  Patient lives with 24-hour aide.  Over the past week and a half he has been weaker than normal and unbalanced when he walks.  But a week and a half ago he did fall.  No reported LOC and is not on anticoagulation.  Patient currently denies complaints at this time.  Blood sugar at triage was 377 and patient does not have a history of diabetes.  Patient also noted to be hypotensive to 80 systolic x 2 at triage.  Patient brought to critical care area.    Mike (Emergency contact) [phone]

## 2023-12-05 NOTE — ED ADULT NURSE NOTE - ED STAT RN HANDOFF DETAILS
Report given to SHERRILL Knight. Pt. baseline mental status. Cleaned and changed. Resp. equal and unlabored b/l. VSS. NAD. Comfort measures provided, safety measures implemented, call bell in reach.

## 2023-12-05 NOTE — ED ADULT NURSE NOTE - CHIEF COMPLAINT QUOTE
BIBA from home c/o generalized weakness and feeling " unbalanced " progressively getting since fall last week. As per HHA pt was unable to get up from the wheelchair today. EMS - pt does not have HX of DM, Pt hypotensive during TRiage - MD moved to CCR for further eval and treatment

## 2023-12-05 NOTE — ED ADULT NURSE NOTE - ALCOHOL PRE SCREEN (AUDIT - C)
Total number of vials mixed:  5  Total number of doses mixed:  27    Total number of Red 1:1 v/v vials mixed:  1 containing 10 mL each and 17 doses. Expiration date: 2/8/19    Total number of yellow 1:10 v/v vials mixed:  1 containing 5 mL each  and 3 doses. Expiration date: 8/8/18    Total number of blue 1:100 v/v vials mixed:  1 containing 5 mL each  and 3 doses. Expiration date: 6/8/18    Total number of green 1:1,000 v/v vials mixed:  1 containing 5 mL each  and 2 doses. Expiration date: 5/8/18    Total number of grey 1:10,000 v/v vials mixed:  1 containing 5 mL each and 2 doses. Expiration date: 5/8/18    Please see scanned documents for mixing prescription. Writer attests that the signature on the scanned prescription for allergy immunotherapy mixing on 2/8/18 is my signature.     Dr. Curran is the onsite supervising physician.   Statement Selected

## 2023-12-05 NOTE — H&P ADULT - NSICDXPASTMEDICALHX_GEN_ALL_CORE_FT
PAST MEDICAL HISTORY:  Enlarged prostate     HLD (hyperlipidemia)     HTN (hypertension)     TBI (traumatic brain injury)

## 2023-12-05 NOTE — ED ADULT NURSE NOTE - HOW OFTEN DO YOU HAVE A DRINK CONTAINING ALCOHOL?
Kidney ultrasound does not show any persistent kidney stones or other concerning abnormalities
Never

## 2023-12-05 NOTE — ED PROVIDER NOTE - PHYSICAL EXAMINATION
Gen: no acute distress  Head: normocephalic, atraumatic  EENT: EOMI, PERRL  Lung: no increased work of breathing, CTABL  CV: normal s1/s2, rrr, 2+ radial pulses b/l  Abd: soft, non-tender, non-distended, no rebound tenderness or guarding  MSK: no visible deformities, full range of motion in all 4 extremities  Neuro: A&Ox4; No focal neurologic deficits

## 2023-12-05 NOTE — ED ADULT TRIAGE NOTE - CHIEF COMPLAINT QUOTE
BIBA from home c/o generalized weakness and feeling " unbalanced " progressively getting since fall last week. As per HHA pt was unable to get up from the wheelchair today. EMS - pt does not have HX of DM BIBA from home c/o generalized weakness and feeling " unbalanced " progressively getting since fall last week. As per HHA pt was unable to get up from the wheelchair today. EMS - pt does not have HX of DM, Pt hypotensive during TRiage - MD moved to CCR for further eval and treatment

## 2023-12-05 NOTE — H&P ADULT - HISTORY OF PRESENT ILLNESS
69 y/o male from home who has a 24/7 aide and uses a walker due to prior TBI. Hx of Insomnia, HTN, HLD, PH. Was treated with Keflex for 5 days starting on 11/24 for URI symptoms which have since resolved. He has been overall weak for the past week and had a fall 5 days ago with no reported injury, head strike, or LOC. Due to ongoing weakness he was brought in. Initial vitals with SBP in 80s, afrebrile, not hypoxic. Appear dehydrated on exam, given 1 liter LR with improvement in SBP to 115. He denies any CP, SOB, N/V/D. He does admit to frequent urination and thirst. No changes in medications. RVP neg, U/A neg, Initial lactate 2.4,  now 1.7 post IVF. CXR unchanged from prior. Trop went from 37-27.

## 2023-12-05 NOTE — ED PROVIDER NOTE - PRINCIPAL DIAGNOSIS
Established Patient - Audio Only Telehealth Visit     The patient location is: Home  The chief complaint leading to consultation is: Medication refill  Visit type: Virtual visit with audio only (telephone)  Total time spent with patient: 20       The reason for the audio only service rather than synchronous audio and video virtual visit was related to technical difficulties or patient preference/necessity.     Each patient to whom I provide medical services by telemedicine is:  (1) informed of the relationship between the physician and patient and the respective role of any other health care provider with respect to management of the patient; and (2) notified that they may decline to receive medical services by telemedicine and may withdraw from such care at any time. Patient verbally consented to receive this service via voice-only telephone call.       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  She reports that all her chronic conditions are stable and being followed outpatient by the appropriate specialist.  Reports had a recent eat GED the visualized a jejunal ulcer but no other abnormalities.  Per GI, recommends that patient has stopped smoking.  Continues to smoke at this time.  Denies any chest pain, shortness a breath, nausea vomiting diarrhea.     Assessment and plan:    Essential hypertension  -     carvediloL (COREG) 3.125 MG tablet; Take 1 tablet (3.125 mg total) by mouth 2 (two) times daily.  Dispense: 180 tablet; Refill: 3  -     furosemide (LASIX) 40 MG tablet; Take 1 tablet (40 mg total) by mouth once daily.  Dispense: 90 tablet; Refill: 3  -     amLODIPine (NORVASC) 10 MG tablet; Take 1 tablet (10 mg total) by mouth once daily.   Dispense: 90 tablet; Refill: 3    Allergic rhinitis, unspecified seasonality, unspecified trigger  -     cetirizine (ZYRTEC) 10 MG tablet; Take 1 tablet (10 mg total) by mouth once daily.  Dispense: 90 tablet; Refill: 3  -     levocetirizine (XYZAL) 5 MG tablet; Take 0.5 tablets (2.5 mg total) by mouth every evening.  Dispense: 15 tablet; Refill: 11  -     montelukast (SINGULAIR) 10 mg tablet; Take 1 tablet (10 mg total) by mouth every evening.  Dispense: 90 tablet; Refill: 1    Chronic nasal congestion  -     fluticasone propionate (FLONASE) 50 mcg/actuation nasal spray; 2 sprays (100 mcg total) by Each Nostril route once daily.  Dispense: 16 g; Refill: 11    Chronic diastolic congestive heart failure  -     furosemide (LASIX) 40 MG tablet; Take 1 tablet (40 mg total) by mouth once daily.  Dispense: 90 tablet; Refill: 3  -     spironolactone (ALDACTONE) 25 MG tablet; Take 1 tablet (25 mg total) by mouth once daily.  Dispense: 90 tablet; Refill: 0    Non-intractable vomiting with nausea  -     promethazine (PHENERGAN) 25 MG tablet; Take 1 tablet (25 mg total) by mouth every 6 (six) hours as needed for Nausea.  Dispense: 15 tablet; Refill: 0    Intractable cluster headache syndrome, unspecified chronicity pattern  -     sumatriptan (IMITREX) 50 MG tablet; Once for severe headache. May repeat once after 2 hours. Do not exceed 3-4 doses in one week.  Dispense: 12 tablet; Refill: 0                This service was not originating from a related E/M service provided within the previous 7 days nor will  to an E/M service or procedure within the next 24 hours or my soonest available appointment.  Prevailing standard of care was able to be met in this audio-only visit.           Elevated troponin

## 2023-12-05 NOTE — ED PROVIDER NOTE - PROGRESS NOTE DETAILS
Attempted to ambulate the patient but patient unable to maintain balance. Will need PT evaluation and possible CECI placement. Observation unable to take patient given Troponin 37-> 27 with delta >3. Will admit patient for further cardiac work-up, PT eval, and possible CECI placement. Radha Villanueva MD PGY-3

## 2023-12-06 DIAGNOSIS — I10 ESSENTIAL (PRIMARY) HYPERTENSION: ICD-10-CM

## 2023-12-06 DIAGNOSIS — E78.5 HYPERLIPIDEMIA, UNSPECIFIED: ICD-10-CM

## 2023-12-06 DIAGNOSIS — R53.1 WEAKNESS: ICD-10-CM

## 2023-12-06 LAB
A1C WITH ESTIMATED AVERAGE GLUCOSE RESULT: 5.8 % — HIGH (ref 4–5.6)
A1C WITH ESTIMATED AVERAGE GLUCOSE RESULT: 5.8 % — HIGH (ref 4–5.6)
ANION GAP SERPL CALC-SCNC: 10 MMOL/L — SIGNIFICANT CHANGE UP (ref 5–17)
ANION GAP SERPL CALC-SCNC: 10 MMOL/L — SIGNIFICANT CHANGE UP (ref 5–17)
BUN SERPL-MCNC: 22.3 MG/DL — HIGH (ref 8–20)
BUN SERPL-MCNC: 22.3 MG/DL — HIGH (ref 8–20)
CALCIUM SERPL-MCNC: 8.1 MG/DL — LOW (ref 8.4–10.5)
CALCIUM SERPL-MCNC: 8.1 MG/DL — LOW (ref 8.4–10.5)
CHLORIDE SERPL-SCNC: 103 MMOL/L — SIGNIFICANT CHANGE UP (ref 96–108)
CHLORIDE SERPL-SCNC: 103 MMOL/L — SIGNIFICANT CHANGE UP (ref 96–108)
CO2 SERPL-SCNC: 22 MMOL/L — SIGNIFICANT CHANGE UP (ref 22–29)
CO2 SERPL-SCNC: 22 MMOL/L — SIGNIFICANT CHANGE UP (ref 22–29)
CREAT SERPL-MCNC: 0.84 MG/DL — SIGNIFICANT CHANGE UP (ref 0.5–1.3)
CREAT SERPL-MCNC: 0.84 MG/DL — SIGNIFICANT CHANGE UP (ref 0.5–1.3)
CULTURE RESULTS: NO GROWTH — SIGNIFICANT CHANGE UP
CULTURE RESULTS: NO GROWTH — SIGNIFICANT CHANGE UP
EGFR: 94 ML/MIN/1.73M2 — SIGNIFICANT CHANGE UP
EGFR: 94 ML/MIN/1.73M2 — SIGNIFICANT CHANGE UP
ESTIMATED AVERAGE GLUCOSE: 120 MG/DL — HIGH (ref 68–114)
ESTIMATED AVERAGE GLUCOSE: 120 MG/DL — HIGH (ref 68–114)
GLUCOSE BLDC GLUCOMTR-MCNC: 101 MG/DL — HIGH (ref 70–99)
GLUCOSE BLDC GLUCOMTR-MCNC: 101 MG/DL — HIGH (ref 70–99)
GLUCOSE BLDC GLUCOMTR-MCNC: 81 MG/DL — SIGNIFICANT CHANGE UP (ref 70–99)
GLUCOSE BLDC GLUCOMTR-MCNC: 81 MG/DL — SIGNIFICANT CHANGE UP (ref 70–99)
GLUCOSE BLDC GLUCOMTR-MCNC: 97 MG/DL — SIGNIFICANT CHANGE UP (ref 70–99)
GLUCOSE BLDC GLUCOMTR-MCNC: 97 MG/DL — SIGNIFICANT CHANGE UP (ref 70–99)
GLUCOSE BLDC GLUCOMTR-MCNC: 99 MG/DL — SIGNIFICANT CHANGE UP (ref 70–99)
GLUCOSE BLDC GLUCOMTR-MCNC: 99 MG/DL — SIGNIFICANT CHANGE UP (ref 70–99)
GLUCOSE SERPL-MCNC: 79 MG/DL — SIGNIFICANT CHANGE UP (ref 70–99)
GLUCOSE SERPL-MCNC: 79 MG/DL — SIGNIFICANT CHANGE UP (ref 70–99)
HCT VFR BLD CALC: 33.9 % — LOW (ref 39–50)
HCT VFR BLD CALC: 33.9 % — LOW (ref 39–50)
HGB BLD-MCNC: 11.4 G/DL — LOW (ref 13–17)
HGB BLD-MCNC: 11.4 G/DL — LOW (ref 13–17)
MAGNESIUM SERPL-MCNC: 2 MG/DL — SIGNIFICANT CHANGE UP (ref 1.8–2.6)
MAGNESIUM SERPL-MCNC: 2 MG/DL — SIGNIFICANT CHANGE UP (ref 1.8–2.6)
MCHC RBC-ENTMCNC: 30.2 PG — SIGNIFICANT CHANGE UP (ref 27–34)
MCHC RBC-ENTMCNC: 30.2 PG — SIGNIFICANT CHANGE UP (ref 27–34)
MCHC RBC-ENTMCNC: 33.6 GM/DL — SIGNIFICANT CHANGE UP (ref 32–36)
MCHC RBC-ENTMCNC: 33.6 GM/DL — SIGNIFICANT CHANGE UP (ref 32–36)
MCV RBC AUTO: 89.9 FL — SIGNIFICANT CHANGE UP (ref 80–100)
MCV RBC AUTO: 89.9 FL — SIGNIFICANT CHANGE UP (ref 80–100)
PHOSPHATE SERPL-MCNC: 3.2 MG/DL — SIGNIFICANT CHANGE UP (ref 2.4–4.7)
PHOSPHATE SERPL-MCNC: 3.2 MG/DL — SIGNIFICANT CHANGE UP (ref 2.4–4.7)
PLATELET # BLD AUTO: 280 K/UL — SIGNIFICANT CHANGE UP (ref 150–400)
PLATELET # BLD AUTO: 280 K/UL — SIGNIFICANT CHANGE UP (ref 150–400)
POTASSIUM SERPL-MCNC: 3.9 MMOL/L — SIGNIFICANT CHANGE UP (ref 3.5–5.3)
POTASSIUM SERPL-MCNC: 3.9 MMOL/L — SIGNIFICANT CHANGE UP (ref 3.5–5.3)
POTASSIUM SERPL-SCNC: 3.9 MMOL/L — SIGNIFICANT CHANGE UP (ref 3.5–5.3)
POTASSIUM SERPL-SCNC: 3.9 MMOL/L — SIGNIFICANT CHANGE UP (ref 3.5–5.3)
RBC # BLD: 3.77 M/UL — LOW (ref 4.2–5.8)
RBC # BLD: 3.77 M/UL — LOW (ref 4.2–5.8)
RBC # FLD: 14.3 % — SIGNIFICANT CHANGE UP (ref 10.3–14.5)
RBC # FLD: 14.3 % — SIGNIFICANT CHANGE UP (ref 10.3–14.5)
SODIUM SERPL-SCNC: 135 MMOL/L — SIGNIFICANT CHANGE UP (ref 135–145)
SODIUM SERPL-SCNC: 135 MMOL/L — SIGNIFICANT CHANGE UP (ref 135–145)
SPECIMEN SOURCE: SIGNIFICANT CHANGE UP
SPECIMEN SOURCE: SIGNIFICANT CHANGE UP
WBC # BLD: 10.42 K/UL — SIGNIFICANT CHANGE UP (ref 3.8–10.5)
WBC # BLD: 10.42 K/UL — SIGNIFICANT CHANGE UP (ref 3.8–10.5)
WBC # FLD AUTO: 10.42 K/UL — SIGNIFICANT CHANGE UP (ref 3.8–10.5)
WBC # FLD AUTO: 10.42 K/UL — SIGNIFICANT CHANGE UP (ref 3.8–10.5)

## 2023-12-06 PROCEDURE — 99233 SBSQ HOSP IP/OBS HIGH 50: CPT | Mod: FS

## 2023-12-06 PROCEDURE — 93306 TTE W/DOPPLER COMPLETE: CPT | Mod: 26

## 2023-12-06 PROCEDURE — 99232 SBSQ HOSP IP/OBS MODERATE 35: CPT

## 2023-12-06 PROCEDURE — 93010 ELECTROCARDIOGRAM REPORT: CPT

## 2023-12-06 RX ADMIN — Medication 75 MILLIGRAM(S): at 22:16

## 2023-12-06 RX ADMIN — Medication 81 MILLIGRAM(S): at 12:02

## 2023-12-06 RX ADMIN — TAMSULOSIN HYDROCHLORIDE 0.4 MILLIGRAM(S): 0.4 CAPSULE ORAL at 22:16

## 2023-12-06 RX ADMIN — HEPARIN SODIUM 5000 UNIT(S): 5000 INJECTION INTRAVENOUS; SUBCUTANEOUS at 05:37

## 2023-12-06 RX ADMIN — ATORVASTATIN CALCIUM 80 MILLIGRAM(S): 80 TABLET, FILM COATED ORAL at 22:16

## 2023-12-06 RX ADMIN — Medication 20 MILLIGRAM(S): at 12:02

## 2023-12-06 RX ADMIN — SODIUM CHLORIDE 3 MILLILITER(S): 9 INJECTION INTRAMUSCULAR; INTRAVENOUS; SUBCUTANEOUS at 21:45

## 2023-12-06 RX ADMIN — SODIUM CHLORIDE 3 MILLILITER(S): 9 INJECTION INTRAMUSCULAR; INTRAVENOUS; SUBCUTANEOUS at 05:38

## 2023-12-06 RX ADMIN — SODIUM CHLORIDE 3 MILLILITER(S): 9 INJECTION INTRAMUSCULAR; INTRAVENOUS; SUBCUTANEOUS at 15:02

## 2023-12-06 RX ADMIN — HEPARIN SODIUM 5000 UNIT(S): 5000 INJECTION INTRAVENOUS; SUBCUTANEOUS at 17:39

## 2023-12-06 NOTE — PATIENT PROFILE ADULT - NSPRONUTRITIONRISK_GEN_A_NUR
# CKD 5  # chronic anemia  # non-ischemic myocardial injury  # HTN  # Acromegaly  # DM    - c/w telemetry monitoring  - no ACS, most likely non-ischemic myocardial injury in setting of CKD  - check TTE to assess LVEF and r/o WM defects  - f/u fasting lipid panel and HbA1C to assess ASCVD risk  - consider stress test to r/o ischemic heart disease as OP/IP # CKD 5  # chronic anemia  # non-ischemic myocardial injury  # HTN  # Acromegaly  # DM    - c/w telemetry monitoring  - no ACS, most likely non-ischemic myocardial injury in setting of CKD  - check TTE to assess LVEF and r/o WM defects  - f/u fasting lipid panel and HbA1C to assess ASCVD risk  - consider stress test to r/o ischemic heart disease after evaluation and treatment for CRF and will consider outpt stress test. No indicators present

## 2023-12-06 NOTE — CONSULT NOTE ADULT - PROBLEM SELECTOR RECOMMENDATION 3
Lipid panel fasting  Continue Statins. Monitor LFTs  DASH diet    Further recommendations base on above findings Lipid panel fasting  Continue Statins. Monitor LFTs  DASH diet    Further recommendations base on above findings  Case discussed with Dr Norton

## 2023-12-06 NOTE — PROGRESS NOTE ADULT - ASSESSMENT
69 y/o male with  hx of TBI, HTN, HLD, PH admitted for ?DM and hypotension.     Predm  -No hx of DM reported, not on medications for it  -A1C of 5.8  -ISS    Hypotension:  -Due to dehydration and being on multiple BP meds  -Cont. to hold all BP meds  -Cont. IVF       Mild. Troponin elevation:  -Due to supply demand mismatch from hypotension  -No CP, EKG pending  -TTE reviewed  -Cardio recs appreciated    TBI:  -No Dysphagia reported  -Uses walker at baseline  -Cont. o/p regimen     HTN:  -Hold BP meds as above  -Resume DASH diet on DC    HLD:  -Statin     PH:  -Cont. Fomax  -Denies LUTs       DVT prophylaxis: Heparin    Dispo: PT states home but needs further optimization  DC in [place]   67 y/o male with  hx of TBI, HTN, HLD, PH admitted for ?DM and hypotension.     Predm  -No hx of DM reported, not on medications for it  -A1C of 5.8  -ISS    Hypotension:  -Due to dehydration and being on multiple BP meds  -Cont. to hold all BP meds  -Cont. IVF       Mild. Troponin elevation:  -Due to supply demand mismatch from hypotension  -No CP, EKG pending  -TTE reviewed  -Cardio recs appreciated    TBI:  -No Dysphagia reported  -Uses walker at baseline  -Cont. o/p regimen     HTN:  -Hold BP meds as above  -Resume DASH diet on DC    HLD:  -Statin     PH:  -Cont. Fomax  -Denies LUTs       DVT prophylaxis: Heparin    Dispo: PT states home but needs further optimization  DC in [place]

## 2023-12-06 NOTE — PHYSICAL THERAPY INITIAL EVALUATION ADULT - PERTINENT HX OF CURRENT PROBLEM, REHAB EVAL
69 y/o male from home who has a 24/7 aide and uses a walker due to prior TBI. Hx of Insomnia, HTN, HLD, PH. Was treated with Keflex for 5 days starting on 11/24 for URI symptoms which have since resolved. He has been overall weak for the past week and had a fall 5 days ago with no reported injury, head strike, or LOC. Due to ongoing weakness he was brought in. Initial vitals with SBP in 80s, afrebrile, not hypoxic. Appear dehydrated on exam, given 1 liter LR with improvement in SBP to 115. He denies any CP, SOB, N/V/D. He does admit to frequent urination and thirst. No changes in medications. RVP neg, U/A neg, Initial lactate 2.4,  now 1.7 post IVF. CXR unchanged from prior. Trop went from 37-27. 67 y/o male from home who has a 24/7 aide and uses a walker due to prior TBI. Hx of Insomnia, HTN, HLD, PH. Was treated with Keflex for 5 days starting on 11/24 for URI symptoms which have since resolved. He has been overall weak for the past week and had a fall 5 days ago with no reported injury, head strike, or LOC. Due to ongoing weakness he was brought in. Initial vitals with SBP in 80s, afrebrile, not hypoxic. Appear dehydrated on exam, given 1 liter LR with improvement in SBP to 115. He denies any CP, SOB, N/V/D. He does admit to frequent urination and thirst. No changes in medications. RVP neg, U/A neg, Initial lactate 2.4,  now 1.7 post IVF. CXR unchanged from prior. Trop went from 37-27.

## 2023-12-06 NOTE — PATIENT PROFILE ADULT - FALL HARM RISK - HARM RISK INTERVENTIONS
Assistance with ambulation/Assistance OOB with selected safe patient handling equipment/Communicate Risk of Fall with Harm to all staff/Discuss with provider need for PT consult/Monitor gait and stability/Provide patient with walking aids - walker, cane, crutches/Reinforce activity limits and safety measures with patient and family/Tailored Fall Risk Interventions/Visual Cue: Yellow wristband and red socks/Bed in lowest position, wheels locked, appropriate side rails in place/Call bell, personal items and telephone in reach/Instruct patient to call for assistance before getting out of bed or chair/Non-slip footwear when patient is out of bed/[place] to call system/Physically safe environment - no spills, clutter or unnecessary equipment/Purposeful Proactive Rounding/Room/bathroom lighting operational, light cord in reach Assistance with ambulation/Assistance OOB with selected safe patient handling equipment/Communicate Risk of Fall with Harm to all staff/Discuss with provider need for PT consult/Monitor gait and stability/Provide patient with walking aids - walker, cane, crutches/Reinforce activity limits and safety measures with patient and family/Tailored Fall Risk Interventions/Visual Cue: Yellow wristband and red socks/Bed in lowest position, wheels locked, appropriate side rails in place/Call bell, personal items and telephone in reach/Instruct patient to call for assistance before getting out of bed or chair/Non-slip footwear when patient is out of bed/[place] to call system/Physically safe environment - no spills, clutter or unnecessary equipment/Purposeful Proactive Rounding/Room/bathroom lighting operational, light cord in reach

## 2023-12-06 NOTE — PHYSICAL THERAPY INITIAL EVALUATION ADULT - ADDITIONAL COMMENTS
history per bridger, pt's live-in aide. pt amb Modified Independent with RW. she assists with all ADLs. there are 2 steps to enter (+rail).

## 2023-12-06 NOTE — PATIENT PROFILE ADULT - FUNCTIONAL ASSESSMENT - BASIC MOBILITY 6.
2-calculated by average/Not able to assess (calculate score using [facility] averaging method)  2-calculated by average/Not able to assess (calculate score using [facility] averaging method)

## 2023-12-06 NOTE — CONSULT NOTE ADULT - SUBJECTIVE AND OBJECTIVE BOX
[facility]                                              CARDIOLOGY AT [address], [place] [address]                                             Telephone: [phone]. Fax:[phone]      CARDIOLOGY CONSULTATION NOTE                                                                                             History obtained by: Patient and medical record  Community Cardiologist: Dr Munoz    obtained:  No   Reason for Consultation: Syncope     Chief complaint:   Patient is a 68y old  Male who presents with a chief complaint of Hypotension    HPI: 69 y/o male with PMHx of pre-DM, CAD (calcium score 433), HTN, HLD, TBI (uses a walker) and aortic dilation  presented from home co weakness. Pt had UTI on  and was treated with Keflex for 5 days, since then symptoms resolved. He has been overall weak for the past week and had a fall 5 days ago with no reported injury, head strike, or LOC. Due to ongoing weakness he was brought in. Initial vitals with SBP in 80s, afebrile, not hypoxic. Appear dehydrated on exam, given 1 liter LR with improvement in SBP to 115. He denies any CP, SOB, N/V/D. He does admit to frequent urination and thirst. No changes in medications. RVP neg, U/A neg, Initial lactate 2.4,  now 1.7 post IVF. CXR unchanged from prior.     CARDIAC TESTING   TTE Echo Complete w/Doppler (19 @ 15:20) >  Summary:   1. Technically difficult study.   2. Hyperdynamic global left ventricular systolic function.   3.Left ventricular ejection fraction, by visual estimation, is >75%.   4. Spectral Doppler shows impaired relaxation pattern of left   ventricular myocardial filling (Grade I diastolic dysfunction).   5. Mild mitral annular calcification.   6. Thickening and calcification of the anterior and posterior mitral   valve leaflets.   7. Mild to moderate aortic regurgitation.  MD Deep Electronically signed on 2019 at 7:58:28 PM  < end of copied text >    CT Angio Cardiac w/ IV Cont (22 @ 11:22) >  IMPRESSION:  Cardiac:  Poor quality study. Suboptimal.  Coronary artery calcium score:433. 75th percentile. Significant coronary   calcification.  At least moderate stenosis in proximal LAD and diagonal. Cannot rule out   underlying significant stenosis.  Normal LV size and function.  Recommend stress test or invasive angiography for further evaluation of   underlying stenosis.  Non Cardiac:  1.  The visualized lungs are clear.  < end of copied text >    PAST MEDICAL HISTORY  HTN (hypertension)  HLD (hyperlipidemia)  TBI (traumatic brain injury)  Enlarged prostate    PAST SURGICAL HISTORY  No significant past surgical history    SOCIAL HISTORY: Denies smoking/alcohol/drugs    FAMILY HISTORY:  Father CHF    Family History of Cardiovascular Disease:   No   Coronary Artery Disease in first degree relative: No   Sudden Cardiac Death in First degree relative: No     HOME MEDICATIONS:   aspirin 81 mg oral delayed release tablet: 1 tab(s) orally once a day (05 Dec 2023 21:40)  atorvastatin 40 mg oral tablet: 1 tab(s) orally once a day once CK level less than 1K, CK level on 19 5K (01 May 2019 11:12)  Flomax 0.4 mg oral capsule: 1 cap(s) orally once a day (at bedtime) (05 Dec 2023 21:39)  FLUoxetine 20 mg oral tablet: 1 tab(s) orally once a day (05 Dec 2023 21:39)  Lipitor 80 mg oral tablet: 1 tab(s) orally once a day (at bedtime) (05 Dec 2023 21:40)  Norvasc 5 mg oral tablet: 1 tab(s) orally once a day (05 Dec 2023 21:39)  propranolol 60 mg oral tablet: 1 tab(s) orally 2 times a day (05 Dec 2023 21:39)  traZODone 50 mg oral tablet: 1.5 tab(s) orally once a day (at bedtime) (05 Dec 2023 21:40)  valsartan-hydrochlorothiazide 160 mg-25 mg oral tablet: 1 tab(s) orally once a day (05 Dec 2023 21:40)    CURRENT OTHER MEDICATIONS:   acetaminophen     Tablet .. 650 milliGRAM(s) Oral every 6 hours PRN Temp greater or equal to 38C (100.4F), Mild Pain (1 - 3)  FLUoxetine 20 milliGRAM(s) Oral daily  melatonin 3 milliGRAM(s) Oral at bedtime PRN Insomnia  ondansetron Injectable 4 milliGRAM(s) IV Push every 8 hours PRN Nausea and/or Vomiting  traZODone 75 milliGRAM(s) Oral at bedtime  aluminum hydroxide/magnesium hydroxide/simethicone Suspension 30 milliLiter(s) Oral every 4 hours PRN Dyspepsia  aspirin enteric coated 81 milliGRAM(s) Oral daily  atorvastatin 80 milliGRAM(s) Oral at bedtime  dextrose 5%. 1000 milliLiter(s) (50 mL/Hr) IV Continuous <Continuous>  dextrose 50% Injectable 25 Gram(s) IV Push once, Stop order after: 1 Doses  dextrose Oral Gel 15 Gram(s) Oral once, Stop order after: 1 Doses PRN Blood Glucose LESS THAN 70 milliGRAM(s)/deciliter  glucagon  Injectable 1 milliGRAM(s) IntraMuscular once, Stop order after: 1 Doses  heparin   Injectable 5000 Unit(s) SubCutaneous every 12 hours  insulin glargine Injectable (LANTUS) 10 Unit(s) SubCutaneous at bedtime  insulin lispro (ADMELOG) corrective regimen sliding scale   SubCutaneous Before meals and at bedtime  insulin lispro Injectable (ADMELOG) 2 Unit(s) SubCutaneous before lunch  insulin lispro Injectable (ADMELOG) 2 Unit(s) SubCutaneous before dinner  insulin lispro Injectable (ADMELOG) 2 Unit(s) SubCutaneous before breakfast  sodium chloride 0.9% lock flush 3 milliLiter(s) IV Push every 8 hours  sodium chloride 0.9%. 1000 milliLiter(s) (125 mL/Hr) IV Continuous <Continuous>  tamsulosin 0.4 milliGRAM(s) Oral at bedtime    ALLERGIES:   enalapril (Unknown)    REVIEW OF SYMPTOMS:   CONSTITUTIONAL: No fever, no chills, no weight loss, no weight gain, no fatigue + weakness   ENMT:  No vertigo; No sinus or throat pain  NECK: No pain or stiffness  CARDIOVASCULAR: No chest pain, no dyspnea, no syncope/presyncope (pt denies), no fatigue, no palpitations, no dizziness, no Orthopnea, no Paroxsymal nocturnal dyspnea  RESPIRATORY: No shortness of breath, no cough  : No dysuria, no hematuria   GI: no nausea, no diarrhea, no constipation, no abdominal pain  NEURO: No headache, no slurred speech   MUSCULOSKELETAL: No joint pain or swelling; No muscle, back, or extremity pain  PSYCH: No agitation, no anxiety.    ALL OTHER REVIEW OF SYSTEMS ARE NEGATIVE.    VITAL SIGNS:   T(C): 36.6 (23 @ 23:15), Max: 36.7 (23 @ 10:47)  T(F): 97.9 (23 @ 23:15), Max: 98 (23 @ 10:47)  HR: 70 (23 @ 23:15) (66 - 78)  BP: 113/67 (23 @ 23:15) (81/47 - 120/77)  RR: 18 (23 @ 23:15) (16 - 20)  SpO2: 93% (23 @ 23:15) (93% - 99%)    PHYSICAL EXAM:   Constitutional: Comfortable . No acute distress.   HEENT: Atraumatic and normocephalic , neck is supple . no JVD.   CNS: A&Ox3. No focal deficits.   Respiratory: CTAB, unlabored. No wheezing, No rhonchi. No crackles   Cardiovascular: RRR normal s1 s2. No murmur. No rubs or gallop.  Gastrointestinal: Soft, non-tender. +Bowel sounds.   Extremities: 2+ Peripheral Pulses, No edema  Psychiatric: Calm . no agitation.   Skin: Warm and dry    LABS:                         12.9   12.01 )-----------( 359      ( 05 Dec 2023 11:00 )             38.1     12-    131<L>  |  94<L>  |  27.7<H>  ----------------------------<  179<H>  3.5   |  23.0  |  1.16    Ca    8.8      05 Dec 2023 11:00    TPro  6.5<L>  /  Alb  3.2<L>  /  TBili  0.6  /  DBili  x   /  AST  19  /  ALT  21  /  AlkPhos  130<H>  12-05    PT/INR - ( 05 Dec 2023 11:00 )   PT: 12.0 sec;   INR: 1.08 ratio      PTT - ( 05 Dec 2023 11:00 )  PTT:24.7 sec    Urinalysis Basic - ( 05 Dec 2023 14:00 )  Color: Yellow / Appearance: Clear / S.013 / pH: x  Gluc: x / Ketone: Negative mg/dL  / Bili: Negative / Urobili: 1.0 mg/dL   Blood: x / Protein: Negative mg/dL / Nitrite: Negative   Leuk Esterase: Negative / RBC: x / WBC x   Sq Epi: x / Non Sq Epi: x / Bacteria: x    ECG: Still pending  Prior ECG: Yes    RADIOLOGY & ADDITIONAL STUDIES:       Preliminary evaluation, please await official recommendations     Assessment and recommendations are final when note is signed by the attending.                                      [facility]                                              CARDIOLOGY AT [address], [place] [address]                                             Telephone: [phone]. Fax:[phone]      CARDIOLOGY CONSULTATION NOTE                                                                                             History obtained by: Patient and medical record  Community Cardiologist: Dr Munoz    obtained:  No   Reason for Consultation: Syncope     Chief complaint:   Patient is a 68y old  Male who presents with a chief complaint of Hypotension    HPI: 67 y/o male with PMHx of pre-DM, CAD (calcium score 433), HTN, HLD, TBI (uses a walker) and aortic dilation  presented from home co weakness. Pt had UTI on  and was treated with Keflex for 5 days, since then symptoms resolved. He has been overall weak for the past week and had a fall 5 days ago with no reported injury, head strike, or LOC. Due to ongoing weakness he was brought in. Initial vitals with SBP in 80s, afebrile, not hypoxic. Appear dehydrated on exam, given 1 liter LR with improvement in SBP to 115. He denies any CP, SOB, N/V/D. He does admit to frequent urination and thirst. No changes in medications. RVP neg, U/A neg, Initial lactate 2.4,  now 1.7 post IVF. CXR unchanged from prior.     CARDIAC TESTING   TTE Echo Complete w/Doppler (19 @ 15:20) >  Summary:   1. Technically difficult study.   2. Hyperdynamic global left ventricular systolic function.   3.Left ventricular ejection fraction, by visual estimation, is >75%.   4. Spectral Doppler shows impaired relaxation pattern of left   ventricular myocardial filling (Grade I diastolic dysfunction).   5. Mild mitral annular calcification.   6. Thickening and calcification of the anterior and posterior mitral   valve leaflets.   7. Mild to moderate aortic regurgitation.  MD Deep Electronically signed on 2019 at 7:58:28 PM  < end of copied text >    CT Angio Cardiac w/ IV Cont (22 @ 11:22) >  IMPRESSION:  Cardiac:  Poor quality study. Suboptimal.  Coronary artery calcium score:433. 75th percentile. Significant coronary   calcification.  At least moderate stenosis in proximal LAD and diagonal. Cannot rule out   underlying significant stenosis.  Normal LV size and function.  Recommend stress test or invasive angiography for further evaluation of   underlying stenosis.  Non Cardiac:  1.  The visualized lungs are clear.  < end of copied text >    PAST MEDICAL HISTORY  HTN (hypertension)  HLD (hyperlipidemia)  TBI (traumatic brain injury)  Enlarged prostate    PAST SURGICAL HISTORY  No significant past surgical history    SOCIAL HISTORY: Denies smoking/alcohol/drugs    FAMILY HISTORY:  Father CHF    Family History of Cardiovascular Disease:   No   Coronary Artery Disease in first degree relative: No   Sudden Cardiac Death in First degree relative: No     HOME MEDICATIONS:   aspirin 81 mg oral delayed release tablet: 1 tab(s) orally once a day (05 Dec 2023 21:40)  atorvastatin 40 mg oral tablet: 1 tab(s) orally once a day once CK level less than 1K, CK level on 19 5K (01 May 2019 11:12)  Flomax 0.4 mg oral capsule: 1 cap(s) orally once a day (at bedtime) (05 Dec 2023 21:39)  FLUoxetine 20 mg oral tablet: 1 tab(s) orally once a day (05 Dec 2023 21:39)  Lipitor 80 mg oral tablet: 1 tab(s) orally once a day (at bedtime) (05 Dec 2023 21:40)  Norvasc 5 mg oral tablet: 1 tab(s) orally once a day (05 Dec 2023 21:39)  propranolol 60 mg oral tablet: 1 tab(s) orally 2 times a day (05 Dec 2023 21:39)  traZODone 50 mg oral tablet: 1.5 tab(s) orally once a day (at bedtime) (05 Dec 2023 21:40)  valsartan-hydrochlorothiazide 160 mg-25 mg oral tablet: 1 tab(s) orally once a day (05 Dec 2023 21:40)    CURRENT OTHER MEDICATIONS:   acetaminophen     Tablet .. 650 milliGRAM(s) Oral every 6 hours PRN Temp greater or equal to 38C (100.4F), Mild Pain (1 - 3)  FLUoxetine 20 milliGRAM(s) Oral daily  melatonin 3 milliGRAM(s) Oral at bedtime PRN Insomnia  ondansetron Injectable 4 milliGRAM(s) IV Push every 8 hours PRN Nausea and/or Vomiting  traZODone 75 milliGRAM(s) Oral at bedtime  aluminum hydroxide/magnesium hydroxide/simethicone Suspension 30 milliLiter(s) Oral every 4 hours PRN Dyspepsia  aspirin enteric coated 81 milliGRAM(s) Oral daily  atorvastatin 80 milliGRAM(s) Oral at bedtime  dextrose 5%. 1000 milliLiter(s) (50 mL/Hr) IV Continuous <Continuous>  dextrose 50% Injectable 25 Gram(s) IV Push once, Stop order after: 1 Doses  dextrose Oral Gel 15 Gram(s) Oral once, Stop order after: 1 Doses PRN Blood Glucose LESS THAN 70 milliGRAM(s)/deciliter  glucagon  Injectable 1 milliGRAM(s) IntraMuscular once, Stop order after: 1 Doses  heparin   Injectable 5000 Unit(s) SubCutaneous every 12 hours  insulin glargine Injectable (LANTUS) 10 Unit(s) SubCutaneous at bedtime  insulin lispro (ADMELOG) corrective regimen sliding scale   SubCutaneous Before meals and at bedtime  insulin lispro Injectable (ADMELOG) 2 Unit(s) SubCutaneous before lunch  insulin lispro Injectable (ADMELOG) 2 Unit(s) SubCutaneous before dinner  insulin lispro Injectable (ADMELOG) 2 Unit(s) SubCutaneous before breakfast  sodium chloride 0.9% lock flush 3 milliLiter(s) IV Push every 8 hours  sodium chloride 0.9%. 1000 milliLiter(s) (125 mL/Hr) IV Continuous <Continuous>  tamsulosin 0.4 milliGRAM(s) Oral at bedtime    ALLERGIES:   enalapril (Unknown)    REVIEW OF SYMPTOMS:   CONSTITUTIONAL: No fever, no chills, no weight loss, no weight gain, no fatigue + weakness   ENMT:  No vertigo; No sinus or throat pain  NECK: No pain or stiffness  CARDIOVASCULAR: No chest pain, no dyspnea, no syncope/presyncope (pt denies), no fatigue, no palpitations, no dizziness, no Orthopnea, no Paroxsymal nocturnal dyspnea  RESPIRATORY: No shortness of breath, no cough  : No dysuria, no hematuria   GI: no nausea, no diarrhea, no constipation, no abdominal pain  NEURO: No headache, no slurred speech   MUSCULOSKELETAL: No joint pain or swelling; No muscle, back, or extremity pain  PSYCH: No agitation, no anxiety.    ALL OTHER REVIEW OF SYSTEMS ARE NEGATIVE.    VITAL SIGNS:   T(C): 36.6 (23 @ 23:15), Max: 36.7 (23 @ 10:47)  T(F): 97.9 (23 @ 23:15), Max: 98 (23 @ 10:47)  HR: 70 (23 @ 23:15) (66 - 78)  BP: 113/67 (23 @ 23:15) (81/47 - 120/77)  RR: 18 (23 @ 23:15) (16 - 20)  SpO2: 93% (23 @ 23:15) (93% - 99%)    PHYSICAL EXAM:   Constitutional: Comfortable . No acute distress.   HEENT: Atraumatic and normocephalic , neck is supple . no JVD.   CNS: A&Ox3. No focal deficits.   Respiratory: CTAB, unlabored. No wheezing, No rhonchi. No crackles   Cardiovascular: RRR normal s1 s2. No murmur. No rubs or gallop.  Gastrointestinal: Soft, non-tender. +Bowel sounds.   Extremities: 2+ Peripheral Pulses, No edema  Psychiatric: Calm . no agitation.   Skin: Warm and dry    LABS:                         12.9   12.01 )-----------( 359      ( 05 Dec 2023 11:00 )             38.1     12-    131<L>  |  94<L>  |  27.7<H>  ----------------------------<  179<H>  3.5   |  23.0  |  1.16    Ca    8.8      05 Dec 2023 11:00    TPro  6.5<L>  /  Alb  3.2<L>  /  TBili  0.6  /  DBili  x   /  AST  19  /  ALT  21  /  AlkPhos  130<H>  12-05    PT/INR - ( 05 Dec 2023 11:00 )   PT: 12.0 sec;   INR: 1.08 ratio      PTT - ( 05 Dec 2023 11:00 )  PTT:24.7 sec    Urinalysis Basic - ( 05 Dec 2023 14:00 )  Color: Yellow / Appearance: Clear / S.013 / pH: x  Gluc: x / Ketone: Negative mg/dL  / Bili: Negative / Urobili: 1.0 mg/dL   Blood: x / Protein: Negative mg/dL / Nitrite: Negative   Leuk Esterase: Negative / RBC: x / WBC x   Sq Epi: x / Non Sq Epi: x / Bacteria: x    ECG: Still pending  Prior ECG: Yes    RADIOLOGY & ADDITIONAL STUDIES:       Preliminary evaluation, please await official recommendations     Assessment and recommendations are final when note is signed by the attending.

## 2023-12-06 NOTE — CONSULT NOTE ADULT - PROBLEM SELECTOR RECOMMENDATION 9
Pt with PMHx of pre-DM, CAD (calcium score 433), HTN, HLD, TBI (uses a walker) and aortic dilation  presented from home co weakness x 5 days  He has been overall weak for the past week and had a fall 5 days ago with no reported injury, head strike, or LOC. Due to ongoing weakness he was brought in. Initial vitals with SBP in 80s,     At my arrival pt denies pre-syncope and/or syncopal episode. States he was sitting on the couch, felt weak and was unable to get up since walker wasn't close enough to him. Denies chest pain, sob, dizziness  EKG: Still pending  Trop x 2 negative (37-27)  Telemonitor  Obtain Echo to assess structural and functional status  Carotid doppler  Orthostatic BP x3, prior to fluids   IV Fluids  Trend trops x 3 q6   Obtain TFTs, lipid panel, A1C to ro comorbidities  Replace all electrolytes and maintain K+~4 and mag ~2

## 2023-12-06 NOTE — CONSULT NOTE ADULT - NS ATTEND AMEND GEN_ALL_CORE FT
Patient seen and examined by me. Seen in presence of: wife. 69 y/o male with PMHx of pre-DM, CAD (calcium score 433), HTN, HLD, TBI (uses a walker) and aortic dilation  presented from home co weakness. Pt had UTI on 11/24 and was treated with Keflex for 5 days, since then symptoms resolved. He has been overall weak for the past week and had a fall 5 days ago with no reported injury, head strike, or LOC. Due to ongoing weakness he was brought in. Initial vitals with SBP in 80s, afebrile, not hypoxic. Appear dehydrated on exam, given 1 liter LR with improvement in SBP to 115. Cardiology consultation for ?syncope    Generalized Weakness  Dehydration, acute  Hx of remote UTI  hx of moderate coronary artery disease by CCTA    pre-diabetic  hypertension  hyperlipidemia  aortic dilation 4.4cm  hx of TBI   ambulatory dysfunction due to TBI using walker  dysphagia    T(C): 36.4 (12-06-23 @ 07:58), Max: 36.7 (12-05-23 @ 21:26)  HR: 69 (12-06-23 @ 07:58) (68 - 78)  BP: 114/63 (12-06-23 @ 07:58) (105/74 - 120/77)  RR: 18 (12-06-23 @ 07:58) (16 - 20)  SpO2: 91% (12-06-23 @ 07:58) (91% - 99%)  Patient alert and awake.  Chest: Bilateral Clear BS  Cardiac: S1 and S2  Abdomen: Soft  Lower extremity: dependent edema, non-pitting      Cardiac history:  Echo: diann 2022:" Normal LVEf. normal RV. mild AI. Dilation of aorta 4.4    CT/MRI: cardiac cta sept 2022: total CAC: 433; LAD: 190; Left main: 7. Cx: 84; RCa : 152 left main: motion artifact. midl asha oderate stenosis. LAD: moderate stenosis. cannot rule out severe stenosis.    Carotid/Aorta/Peripheral Vascular: apt 2022: Us aorta: No AAA. no stenosis. no plaqwue.    Other: aug 2023: LDL : 100. HDL : 36 Tgs: 127      Patient presents for generalized weakness and acute dehydration. He and his caregiver bedside, partner, adamantly deny pre-syncope or syncope. He simply was unable to rise from sitting with his walker which he was able to do in the past several weeks. He has a history of mechanical fall due to generalized weakness recent history.    Denies chest pain/pressure, palpitations, irregular and/or rapid heart beat, SOB, SCHWARZ, syncope/near syncope, dizziness, orthopnea, PND, cough, edema, f/c, n/v/d, hematuria, or hematochezia.      further evaluation of generalized weakness with infectious workup is needed. We will obtain a 2D TTE.  He was planned for an outpatient nuclear pharm stress test. He is not in physical condition for this testing at present. Can be done as outpatient when generalized weakness resolves or if clinical status dictates.      Patient and family are in agreement.        Patient was seen and evaluated. Available pertinent records, imaging reports and lab results were reviewed by the Heart team and mentioned as appropriate. Plan of care was discussed with the patient and any available family members (with patient consent), with questions answered and treatment plan agreement. We have informed the primary team of our treatment plan. The patient is aware of any side effects of new medications initiated, risks/benefits/alternatives of procedures/imaging planned.    I have discussed my recommendation with the ACP which are outlined above. Thank you for allowing me to participate in the care of this patient.

## 2023-12-06 NOTE — CONSULT NOTE ADULT - PROBLEM SELECTOR RECOMMENDATION 2
Pt hypotensive at arrival 81/47  Hold home meds x now, and re-start when BP stable  Monitor BP  DASH diet

## 2023-12-07 ENCOUNTER — TRANSCRIPTION ENCOUNTER (OUTPATIENT)
Age: 68
End: 2023-12-07

## 2023-12-07 LAB
ANION GAP SERPL CALC-SCNC: 9 MMOL/L — SIGNIFICANT CHANGE UP (ref 5–17)
ANION GAP SERPL CALC-SCNC: 9 MMOL/L — SIGNIFICANT CHANGE UP (ref 5–17)
BASOPHILS # BLD AUTO: 0.02 K/UL — SIGNIFICANT CHANGE UP (ref 0–0.2)
BASOPHILS # BLD AUTO: 0.02 K/UL — SIGNIFICANT CHANGE UP (ref 0–0.2)
BASOPHILS NFR BLD AUTO: 0.2 % — SIGNIFICANT CHANGE UP (ref 0–2)
BASOPHILS NFR BLD AUTO: 0.2 % — SIGNIFICANT CHANGE UP (ref 0–2)
BUN SERPL-MCNC: 18.5 MG/DL — SIGNIFICANT CHANGE UP (ref 8–20)
BUN SERPL-MCNC: 18.5 MG/DL — SIGNIFICANT CHANGE UP (ref 8–20)
CALCIUM SERPL-MCNC: 8.3 MG/DL — LOW (ref 8.4–10.5)
CALCIUM SERPL-MCNC: 8.3 MG/DL — LOW (ref 8.4–10.5)
CHLORIDE SERPL-SCNC: 101 MMOL/L — SIGNIFICANT CHANGE UP (ref 96–108)
CHLORIDE SERPL-SCNC: 101 MMOL/L — SIGNIFICANT CHANGE UP (ref 96–108)
CO2 SERPL-SCNC: 25 MMOL/L — SIGNIFICANT CHANGE UP (ref 22–29)
CO2 SERPL-SCNC: 25 MMOL/L — SIGNIFICANT CHANGE UP (ref 22–29)
CREAT SERPL-MCNC: 0.86 MG/DL — SIGNIFICANT CHANGE UP (ref 0.5–1.3)
CREAT SERPL-MCNC: 0.86 MG/DL — SIGNIFICANT CHANGE UP (ref 0.5–1.3)
EGFR: 94 ML/MIN/1.73M2 — SIGNIFICANT CHANGE UP
EGFR: 94 ML/MIN/1.73M2 — SIGNIFICANT CHANGE UP
EOSINOPHIL # BLD AUTO: 0.27 K/UL — SIGNIFICANT CHANGE UP (ref 0–0.5)
EOSINOPHIL # BLD AUTO: 0.27 K/UL — SIGNIFICANT CHANGE UP (ref 0–0.5)
EOSINOPHIL NFR BLD AUTO: 3.1 % — SIGNIFICANT CHANGE UP (ref 0–6)
EOSINOPHIL NFR BLD AUTO: 3.1 % — SIGNIFICANT CHANGE UP (ref 0–6)
GLUCOSE BLDC GLUCOMTR-MCNC: 106 MG/DL — HIGH (ref 70–99)
GLUCOSE BLDC GLUCOMTR-MCNC: 106 MG/DL — HIGH (ref 70–99)
GLUCOSE BLDC GLUCOMTR-MCNC: 117 MG/DL — HIGH (ref 70–99)
GLUCOSE BLDC GLUCOMTR-MCNC: 117 MG/DL — HIGH (ref 70–99)
GLUCOSE BLDC GLUCOMTR-MCNC: 89 MG/DL — SIGNIFICANT CHANGE UP (ref 70–99)
GLUCOSE BLDC GLUCOMTR-MCNC: 89 MG/DL — SIGNIFICANT CHANGE UP (ref 70–99)
GLUCOSE BLDC GLUCOMTR-MCNC: 97 MG/DL — SIGNIFICANT CHANGE UP (ref 70–99)
GLUCOSE BLDC GLUCOMTR-MCNC: 97 MG/DL — SIGNIFICANT CHANGE UP (ref 70–99)
GLUCOSE SERPL-MCNC: 88 MG/DL — SIGNIFICANT CHANGE UP (ref 70–99)
GLUCOSE SERPL-MCNC: 88 MG/DL — SIGNIFICANT CHANGE UP (ref 70–99)
HCT VFR BLD CALC: 35.6 % — LOW (ref 39–50)
HCT VFR BLD CALC: 35.6 % — LOW (ref 39–50)
HGB BLD-MCNC: 11.9 G/DL — LOW (ref 13–17)
HGB BLD-MCNC: 11.9 G/DL — LOW (ref 13–17)
IMM GRANULOCYTES NFR BLD AUTO: 0.6 % — SIGNIFICANT CHANGE UP (ref 0–0.9)
IMM GRANULOCYTES NFR BLD AUTO: 0.6 % — SIGNIFICANT CHANGE UP (ref 0–0.9)
LYMPHOCYTES # BLD AUTO: 1 K/UL — SIGNIFICANT CHANGE UP (ref 1–3.3)
LYMPHOCYTES # BLD AUTO: 1 K/UL — SIGNIFICANT CHANGE UP (ref 1–3.3)
LYMPHOCYTES # BLD AUTO: 11.6 % — LOW (ref 13–44)
LYMPHOCYTES # BLD AUTO: 11.6 % — LOW (ref 13–44)
MCHC RBC-ENTMCNC: 29.9 PG — SIGNIFICANT CHANGE UP (ref 27–34)
MCHC RBC-ENTMCNC: 29.9 PG — SIGNIFICANT CHANGE UP (ref 27–34)
MCHC RBC-ENTMCNC: 33.4 GM/DL — SIGNIFICANT CHANGE UP (ref 32–36)
MCHC RBC-ENTMCNC: 33.4 GM/DL — SIGNIFICANT CHANGE UP (ref 32–36)
MCV RBC AUTO: 89.4 FL — SIGNIFICANT CHANGE UP (ref 80–100)
MCV RBC AUTO: 89.4 FL — SIGNIFICANT CHANGE UP (ref 80–100)
MONOCYTES # BLD AUTO: 0.89 K/UL — SIGNIFICANT CHANGE UP (ref 0–0.9)
MONOCYTES # BLD AUTO: 0.89 K/UL — SIGNIFICANT CHANGE UP (ref 0–0.9)
MONOCYTES NFR BLD AUTO: 10.3 % — SIGNIFICANT CHANGE UP (ref 2–14)
MONOCYTES NFR BLD AUTO: 10.3 % — SIGNIFICANT CHANGE UP (ref 2–14)
NEUTROPHILS # BLD AUTO: 6.42 K/UL — SIGNIFICANT CHANGE UP (ref 1.8–7.4)
NEUTROPHILS # BLD AUTO: 6.42 K/UL — SIGNIFICANT CHANGE UP (ref 1.8–7.4)
NEUTROPHILS NFR BLD AUTO: 74.2 % — SIGNIFICANT CHANGE UP (ref 43–77)
NEUTROPHILS NFR BLD AUTO: 74.2 % — SIGNIFICANT CHANGE UP (ref 43–77)
PLATELET # BLD AUTO: 264 K/UL — SIGNIFICANT CHANGE UP (ref 150–400)
PLATELET # BLD AUTO: 264 K/UL — SIGNIFICANT CHANGE UP (ref 150–400)
POTASSIUM SERPL-MCNC: 4.1 MMOL/L — SIGNIFICANT CHANGE UP (ref 3.5–5.3)
POTASSIUM SERPL-MCNC: 4.1 MMOL/L — SIGNIFICANT CHANGE UP (ref 3.5–5.3)
POTASSIUM SERPL-SCNC: 4.1 MMOL/L — SIGNIFICANT CHANGE UP (ref 3.5–5.3)
POTASSIUM SERPL-SCNC: 4.1 MMOL/L — SIGNIFICANT CHANGE UP (ref 3.5–5.3)
RBC # BLD: 3.98 M/UL — LOW (ref 4.2–5.8)
RBC # BLD: 3.98 M/UL — LOW (ref 4.2–5.8)
RBC # FLD: 14.5 % — SIGNIFICANT CHANGE UP (ref 10.3–14.5)
RBC # FLD: 14.5 % — SIGNIFICANT CHANGE UP (ref 10.3–14.5)
SODIUM SERPL-SCNC: 135 MMOL/L — SIGNIFICANT CHANGE UP (ref 135–145)
SODIUM SERPL-SCNC: 135 MMOL/L — SIGNIFICANT CHANGE UP (ref 135–145)
WBC # BLD: 8.65 K/UL — SIGNIFICANT CHANGE UP (ref 3.8–10.5)
WBC # BLD: 8.65 K/UL — SIGNIFICANT CHANGE UP (ref 3.8–10.5)
WBC # FLD AUTO: 8.65 K/UL — SIGNIFICANT CHANGE UP (ref 3.8–10.5)
WBC # FLD AUTO: 8.65 K/UL — SIGNIFICANT CHANGE UP (ref 3.8–10.5)

## 2023-12-07 PROCEDURE — 99232 SBSQ HOSP IP/OBS MODERATE 35: CPT

## 2023-12-07 RX ORDER — ATORVASTATIN CALCIUM 80 MG/1
1 TABLET, FILM COATED ORAL
Qty: 0 | Refills: 0 | DISCHARGE

## 2023-12-07 RX ORDER — AMLODIPINE BESYLATE 2.5 MG/1
1 TABLET ORAL
Refills: 0 | DISCHARGE

## 2023-12-07 RX ADMIN — HEPARIN SODIUM 5000 UNIT(S): 5000 INJECTION INTRAVENOUS; SUBCUTANEOUS at 16:59

## 2023-12-07 RX ADMIN — Medication 20 MILLIGRAM(S): at 16:57

## 2023-12-07 RX ADMIN — ATORVASTATIN CALCIUM 80 MILLIGRAM(S): 80 TABLET, FILM COATED ORAL at 21:45

## 2023-12-07 RX ADMIN — SODIUM CHLORIDE 3 MILLILITER(S): 9 INJECTION INTRAMUSCULAR; INTRAVENOUS; SUBCUTANEOUS at 22:02

## 2023-12-07 RX ADMIN — Medication 75 MILLIGRAM(S): at 21:41

## 2023-12-07 RX ADMIN — SODIUM CHLORIDE 3 MILLILITER(S): 9 INJECTION INTRAMUSCULAR; INTRAVENOUS; SUBCUTANEOUS at 13:11

## 2023-12-07 RX ADMIN — Medication 81 MILLIGRAM(S): at 16:57

## 2023-12-07 RX ADMIN — HEPARIN SODIUM 5000 UNIT(S): 5000 INJECTION INTRAVENOUS; SUBCUTANEOUS at 06:02

## 2023-12-07 RX ADMIN — TAMSULOSIN HYDROCHLORIDE 0.4 MILLIGRAM(S): 0.4 CAPSULE ORAL at 21:41

## 2023-12-07 NOTE — DISCHARGE NOTE PROVIDER - NSDCMRMEDTOKEN_GEN_ALL_CORE_FT
aspirin 81 mg oral delayed release tablet: 1 tab(s) orally once a day  atorvastatin 40 mg oral tablet: 1 tab(s) orally once a day once CK level less than 1K, CK level on 5/1/19 5K  Flomax 0.4 mg oral capsule: 1 cap(s) orally once a day (at bedtime)  FLUoxetine 20 mg oral tablet: 1 tab(s) orally once a day  Lipitor 80 mg oral tablet: 1 tab(s) orally once a day (at bedtime)  Norvasc 5 mg oral tablet: 1 tab(s) orally once a day  propranolol 60 mg oral tablet: 1 tab(s) orally 2 times a day  traZODone 50 mg oral tablet: 1.5 tab(s) orally once a day (at bedtime)  valsartan-hydrochlorothiazide 160 mg-25 mg oral tablet: 1 tab(s) orally once a day   aspirin 81 mg oral delayed release tablet: 1 tab(s) orally once a day  Flomax 0.4 mg oral capsule: 1 cap(s) orally once a day (at bedtime)  FLUoxetine 20 mg oral tablet: 1 tab(s) orally once a day  Lipitor 80 mg oral tablet: 1 tab(s) orally once a day (at bedtime)  propranolol 60 mg oral tablet: 1 tab(s) orally 2 times a day  traZODone 50 mg oral tablet: 1.5 tab(s) orally once a day (at bedtime)

## 2023-12-07 NOTE — DISCHARGE NOTE PROVIDER - CARE PROVIDERS DIRECT ADDRESSES
,[email or web address] ,[email or web address],[email or web address]

## 2023-12-07 NOTE — DISCHARGE NOTE PROVIDER - ATTENDING DISCHARGE PHYSICAL EXAMINATION:
Constitutional: NAD, Resting  ENT: Supple, No JVD  Lungs: CTA B/L, Non-labored breathing  Cardio: RRR, S1/S2, No murmur  Abdomen: Soft, Nontender, Nondistended; Bowel sounds present  Extremities: No calf tenderness, No pitting edema  Musculoskeletal:   No joint swelling  Psych: Calm, cooperative affect appropriate  Neuro: Awake and alert  Skin: No rashes; no palpable lesions yes

## 2023-12-07 NOTE — PROGRESS NOTE ADULT - SUBJECTIVE AND OBJECTIVE BOX
Hospitalist Daily Progress Note    Chief Complaint:  Patient is a 68y old  Male who presents with a chief complaint of Hypotension  New onset DM (06 Dec 2023 00:17)      SUBJECTIVE / OVERNIGHT EVENTS:  Patient was seen and examined at bedside. No complaints  Patient denies chest pain, SOB, abd pain, N/V, fever, chills, dysuria or any other complaints. All remainder ROS negative.     MEDICATIONS  (STANDING):  aspirin enteric coated 81 milliGRAM(s) Oral daily  atorvastatin 80 milliGRAM(s) Oral at bedtime  dextrose 5%. 1000 milliLiter(s) (50 mL/Hr) IV Continuous <Continuous>  dextrose 50% Injectable 25 Gram(s) IV Push once  FLUoxetine 20 milliGRAM(s) Oral daily  glucagon  Injectable 1 milliGRAM(s) IntraMuscular once  heparin   Injectable 5000 Unit(s) SubCutaneous every 12 hours  insulin glargine Injectable (LANTUS) 10 Unit(s) SubCutaneous at bedtime  insulin lispro (ADMELOG) corrective regimen sliding scale   SubCutaneous Before meals and at bedtime  insulin lispro Injectable (ADMELOG) 2 Unit(s) SubCutaneous before lunch  insulin lispro Injectable (ADMELOG) 2 Unit(s) SubCutaneous before dinner  insulin lispro Injectable (ADMELOG) 2 Unit(s) SubCutaneous before breakfast  sodium chloride 0.9% lock flush 3 milliLiter(s) IV Push every 8 hours  sodium chloride 0.9%. 1000 milliLiter(s) (125 mL/Hr) IV Continuous <Continuous>  tamsulosin 0.4 milliGRAM(s) Oral at bedtime  traZODone 75 milliGRAM(s) Oral at bedtime    MEDICATIONS  (PRN):  acetaminophen     Tablet .. 650 milliGRAM(s) Oral every 6 hours PRN Temp greater or equal to 38C (100.4F), Mild Pain (1 - 3)  aluminum hydroxide/magnesium hydroxide/simethicone Suspension 30 milliLiter(s) Oral every 4 hours PRN Dyspepsia  dextrose Oral Gel 15 Gram(s) Oral once PRN Blood Glucose LESS THAN 70 milliGRAM(s)/deciliter  melatonin 3 milliGRAM(s) Oral at bedtime PRN Insomnia  ondansetron Injectable 4 milliGRAM(s) IV Push every 8 hours PRN Nausea and/or Vomiting        I&O's Summary      PHYSICAL EXAM:  Vital Signs Last 24 Hrs  T(C): 36.6 (06 Dec 2023 15:16), Max: 36.7 (05 Dec 2023 21:26)  T(F): 97.9 (06 Dec 2023 15:16), Max: 98 (05 Dec 2023 21:26)  HR: 105 (06 Dec 2023 15:16) (68 - 105)  BP: 93/59 (06 Dec 2023 15:16) (93/59 - 120/74)  BP(mean): 87 (05 Dec 2023 21:26) (87 - 87)  RR: 18 (06 Dec 2023 15:16) (16 - 18)  SpO2: 95% (06 Dec 2023 15:16) (91% - 99%)    Parameters below as of 06 Dec 2023 15:16  Patient On (Oxygen Delivery Method): room air          Constitutional: NAD, Resting  ENT: Supple, No JVD  Lungs: CTA B/L, Non-labored breathing  Cardio: RRR, S1/S2, No murmur  Abdomen: Soft, Nontender, Nondistended; Bowel sounds present  Extremities: No calf tenderness, No pitting edema  Musculoskeletal:   No joint swelling  Psych: Calm, cooperative affect appropriate  Neuro: Awake and alert  Skin: No rashes; no palpable lesions    LABS:                        11.4   10.42 )-----------( 280      ( 06 Dec 2023 02:00 )             33.9     12-06    135  |  103  |  22.3<H>  ----------------------------<  79  3.9   |  22.0  |  0.84    Ca    8.1<L>      06 Dec 2023 02:00  Phos  3.2     12-06  Mg     2.0     12-06    TPro  6.5<L>  /  Alb  3.2<L>  /  TBili  0.6  /  DBili  x   /  AST  19  /  ALT  21  /  AlkPhos  130<H>  12-05    PT/INR - ( 05 Dec 2023 11:00 )   PT: 12.0 sec;   INR: 1.08 ratio         PTT - ( 05 Dec 2023 11:00 )  PTT:24.7 sec      Urinalysis Basic - ( 06 Dec 2023 02:00 )    Color: x / Appearance: x / SG: x / pH: x  Gluc: 79 mg/dL / Ketone: x  / Bili: x / Urobili: x   Blood: x / Protein: x / Nitrite: x   Leuk Esterase: x / RBC: x / WBC x   Sq Epi: x / Non Sq Epi: x / Bacteria: x        Culture - Urine (collected 05 Dec 2023 14:00)  Source: Clean Catch Clean Catch (Midstream)  Final Report (06 Dec 2023 15:24):    No growth    Culture - Blood (collected 05 Dec 2023 11:05)  Source: .Blood Blood-Peripheral  Preliminary Report (06 Dec 2023 16:02):    No growth at 24 hours    Culture - Blood (collected 05 Dec 2023 11:00)  Source: .Blood Blood-Peripheral  Preliminary Report (06 Dec 2023 16:02):    No growth at 24 hours      CAPILLARY BLOOD GLUCOSE      POCT Blood Glucose.: 101 mg/dL (06 Dec 2023 12:16)  POCT Blood Glucose.: 81 mg/dL (06 Dec 2023 08:58)  POCT Blood Glucose.: 96 mg/dL (05 Dec 2023 22:02)        RADIOLOGY REVIEWED  
Hospitalist Daily Progress Note    Chief Complaint:  Patient is a 68y old  Male who presents with a chief complaint of Hypotension  New onset DM (07 Dec 2023 07:05)      SUBJECTIVE / OVERNIGHT EVENTS:  Patient was seen and examined at bedside. No complaints  Patient denies chest pain, SOB, abd pain, N/V, fever, chills, dysuria or any other complaints. All remainder ROS negative.     MEDICATIONS  (STANDING):  aspirin enteric coated 81 milliGRAM(s) Oral daily  atorvastatin 80 milliGRAM(s) Oral at bedtime  dextrose 5%. 1000 milliLiter(s) (50 mL/Hr) IV Continuous <Continuous>  dextrose 50% Injectable 25 Gram(s) IV Push once  FLUoxetine 20 milliGRAM(s) Oral daily  glucagon  Injectable 1 milliGRAM(s) IntraMuscular once  heparin   Injectable 5000 Unit(s) SubCutaneous every 12 hours  insulin lispro (ADMELOG) corrective regimen sliding scale   SubCutaneous Before meals and at bedtime  propranolol 60 milliGRAM(s) Oral two times a day  sodium chloride 0.9% lock flush 3 milliLiter(s) IV Push every 8 hours  sodium chloride 0.9%. 1000 milliLiter(s) (125 mL/Hr) IV Continuous <Continuous>  tamsulosin 0.4 milliGRAM(s) Oral at bedtime  traZODone 75 milliGRAM(s) Oral at bedtime    MEDICATIONS  (PRN):  acetaminophen     Tablet .. 650 milliGRAM(s) Oral every 6 hours PRN Temp greater or equal to 38C (100.4F), Mild Pain (1 - 3)  aluminum hydroxide/magnesium hydroxide/simethicone Suspension 30 milliLiter(s) Oral every 4 hours PRN Dyspepsia  dextrose Oral Gel 15 Gram(s) Oral once PRN Blood Glucose LESS THAN 70 milliGRAM(s)/deciliter  melatonin 3 milliGRAM(s) Oral at bedtime PRN Insomnia  ondansetron Injectable 4 milliGRAM(s) IV Push every 8 hours PRN Nausea and/or Vomiting        I&O's Summary    06 Dec 2023 07:01  -  07 Dec 2023 07:00  --------------------------------------------------------  IN: 0 mL / OUT: 500 mL / NET: -500 mL        PHYSICAL EXAM:  Vital Signs Last 24 Hrs  T(C): 37.1 (07 Dec 2023 15:46), Max: 37.2 (07 Dec 2023 04:11)  T(F): 98.8 (07 Dec 2023 15:46), Max: 99 (07 Dec 2023 04:11)  HR: 100 (07 Dec 2023 15:46) (100 - 120)  BP: 103/65 (07 Dec 2023 15:46) (95/53 - 135/65)  BP(mean): 78 (07 Dec 2023 15:46) (78 - 78)  RR: 18 (07 Dec 2023 15:46) (18 - 18)  SpO2: 94% (07 Dec 2023 15:46) (92% - 94%)    Parameters below as of 07 Dec 2023 15:46  Patient On (Oxygen Delivery Method): room air          Constitutional: NAD, Resting  ENT: Supple, No JVD  Lungs: CTA B/L, Non-labored breathing  Cardio: RRR, S1/S2, No murmur  Abdomen: Soft, Nontender, Nondistended; Bowel sounds present  Extremities: No calf tenderness, No pitting edema  Musculoskeletal:   No joint swelling  Psych: Calm, cooperative affect appropriate  Neuro: Awake and alert  Skin: No rashes; no palpable lesions    LABS:                        11.9   8.65  )-----------( 264      ( 07 Dec 2023 07:04 )             35.6     12-07    135  |  101  |  18.5  ----------------------------<  88  4.1   |  25.0  |  0.86    Ca    8.3<L>      07 Dec 2023 07:04  Phos  3.2     12-06  Mg     2.0     12-06            Urinalysis Basic - ( 07 Dec 2023 07:04 )    Color: x / Appearance: x / SG: x / pH: x  Gluc: 88 mg/dL / Ketone: x  / Bili: x / Urobili: x   Blood: x / Protein: x / Nitrite: x   Leuk Esterase: x / RBC: x / WBC x   Sq Epi: x / Non Sq Epi: x / Bacteria: x        Culture - Urine (collected 05 Dec 2023 14:00)  Source: Clean Catch Clean Catch (Midstream)  Final Report (06 Dec 2023 15:24):    No growth    Culture - Blood (collected 05 Dec 2023 11:05)  Source: .Blood Blood-Peripheral  Preliminary Report (07 Dec 2023 16:01):    No growth at 48 Hours    Culture - Blood (collected 05 Dec 2023 11:00)  Source: .Blood Blood-Peripheral  Preliminary Report (07 Dec 2023 16:01):    No growth at 48 Hours      CAPILLARY BLOOD GLUCOSE      POCT Blood Glucose.: 97 mg/dL (07 Dec 2023 16:56)  POCT Blood Glucose.: 106 mg/dL (07 Dec 2023 12:50)  POCT Blood Glucose.: 89 mg/dL (07 Dec 2023 09:12)  POCT Blood Glucose.: 97 mg/dL (06 Dec 2023 22:15)  POCT Blood Glucose.: 99 mg/dL (06 Dec 2023 17:30)        RADIOLOGY REVIEWED

## 2023-12-07 NOTE — DISCHARGE NOTE PROVIDER - NSDCCPCAREPLAN_GEN_ALL_CORE_FT
PRINCIPAL DISCHARGE DIAGNOSIS  Diagnosis: Prediabetes  Assessment and Plan of Treatment: - Glucose elevated on admission, and A1c 5.8.   - Glucose monitored closely and corrected with sliding scale as needed.   - Continue with home medications and glucose checks as directed.   - Follow up with your PCP for monitoring and medication optimizaiton.   - Follow up for regular visits with podiatry, optometry, and nephrology for monitoring.   - Continue with diabetic friendly, carbohydrate consistent diet.      SECONDARY DISCHARGE DIAGNOSES  Diagnosis: Hypotension  Assessment and Plan of Treatment: - Treated and improved with IV fluids. In setting of dehydration due to elevated glucose.    Diagnosis: Elevated troponin  Assessment and Plan of Treatment: - Due to increased demand. Followed by cardiology, troponin downtrended. TTE without change from previous. Follow up outpatient with your PCP and cardiology.    Diagnosis: HTN (hypertension)  Assessment and Plan of Treatment: - Medications were held due to low blood pressures.   - Follow up outpatient with your PCP and Cardiology for monitoring and medication optimization. Follow a heart healthy, low sodium diet.     PRINCIPAL DISCHARGE DIAGNOSIS  Diagnosis: Prediabetes  Assessment and Plan of Treatment: - Glucose elevated on admission, and A1c 5.8.   - Glucose monitored closely and corrected with sliding scale as needed.   - Continue with home medications and glucose checks as directed.   - Follow up with your PCP for monitoring and medication optimizaiton.   - Follow up for regular visits with podiatry, optometry, and nephrology for monitoring.   - Continue with diabetic friendly, carbohydrate consistent diet.      SECONDARY DISCHARGE DIAGNOSES  Diagnosis: Hypotension  Assessment and Plan of Treatment: - Treated and improved with IV fluids. In setting of dehydration due to elevated glucose.  - Hold bp meds and reassess blood pressure  - Follow up with PCP in regards to restarting    Diagnosis: Elevated troponin  Assessment and Plan of Treatment: - Due to increased demand. Followed by cardiology, troponin downtrended. TTE without change from previous. Follow up outpatient with your PCP and cardiology.    Diagnosis: HTN (hypertension)  Assessment and Plan of Treatment: - Medications were held due to low blood pressures.   - Follow up outpatient with your PCP and Cardiology for monitoring and medication optimization. Follow a heart healthy, low sodium diet.

## 2023-12-07 NOTE — DISCHARGE NOTE PROVIDER - CARE PROVIDER_API CALL
Jessica Whitman, [address], [address]  [place] [address]  Phone: [phone]  Fax: [phone]  Follow Up Time: 1-3 days   Jessica Whitman, [address], [address]  [place] [address]  Phone: [phone]  Fax: [phone]  Follow Up Time: 1-3 days   Jessica Whitman[address], [address]  [place] [address]  Phone: [phone]  Fax: [phone]  Follow Up Time: 1-3 days    Nahun Norton  Cardiovascular Disease  [address] [facility], [address]  [place] [address]  Phone: [phone]  Fax: [phone]  Follow Up Time: 1 week   Jessica Whitman[address], [address]  [place] [address]  Phone: [phone]  Fax: [phone]  Follow Up Time: 1-3 days    Nahun Norton  Cardiovascular Disease  [address] [facility], [address]  [place] [address]  Phone: [phone]  Fax: [phone]  Follow Up Time: 1 week

## 2023-12-07 NOTE — PROGRESS NOTE ADULT - ASSESSMENT
69 y/o male with  hx of TBI, HTN, HLD, PH admitted for ?DM and hypotension.     Predm  -No hx of DM reported, not on medications for it  -A1C of 5.8  -ISS    Hypotension:  -Due to dehydration and being on multiple BP meds  -Cont. to hold  BP meds  -Started propranolol   -Cont. IVF       Mild. Troponin elevation:  -Due to supply demand mismatch from hypotension  -No CP, EKG pending  -TTE reviewed  -Cardio recs appreciated    TBI:  -No Dysphagia reported  -Uses walker at baseline  -Cont. o/p regimen     HTN:  -Hold BP meds as above  -Resume DASH diet on DC    HLD:  -Statin     PH:  -Cont. Fomax  -Denies LUTs       DVT prophylaxis: Heparin    Dispo: Repeat PT and DC home.

## 2023-12-07 NOTE — DISCHARGE NOTE PROVIDER - PROVIDER TOKENS
PROVIDER:[TOKEN:[[id number]],FOLLOWUP:[1-3 days]] PROVIDER:[TOKEN:[[id number]],FOLLOWUP:[1-3 days]],PROVIDER:[TOKEN:[[id number]],FOLLOWUP:[1 week]] PROVIDER:[TOKEN:[[id number]],FOLLOWUP:[1-3 days]],PROVIDER:[TOKEN:[[id number]],FOLLOWUP:[1 week]]

## 2023-12-07 NOTE — DISCHARGE NOTE PROVIDER - HOSPITAL COURSE
68 year old male with a PMHx of pre-DM, CAD (calcium score 433), HTN, HLD, TBI, aortic dilation, recently treated for UTI, who presented to [facility] ED with c/o weakness a/w polyuria. In the ED, patient was hypotensive but otherwise hemodynamically stable. He was treated with IVF. Labs with elevated lactate and troponin which subsequently downtrended. Glucose > 200. UA negative, RVP negative. CXR with no acute pathology. Patient was admitted for possible new onset DM and elevated troponin. Followed by cardiology, troponin stable. TTE unchanged from prior. A1C returned at 5.8 consistent with prediabetes. Glucose monitored and treated per ISS. HTN regimen was held in the setting of hypotension, which improved with IVF. Evaluated by PT who recommend home with PT vs. home with 24/7 assist. 68 year old male with a PMHx of pre-DM, CAD (calcium score 433), HTN, HLD, TBI, aortic dilation, recently treated for UTI, who presented to [facility] ED with c/o weakness a/w polyuria. In the ED, patient was hypotensive but otherwise hemodynamically stable. He was treated with IVF. Labs with elevated lactate and troponin which subsequently downtrended. Glucose > 200. UA negative, RVP negative. CXR with no acute pathology. Patient was admitted for possible new onset DM and elevated troponin. Followed by cardiology, troponin stable. TTE unchanged from prior. A1C returned at 5.8 consistent with prediabetes. Glucose monitored and treated per ISS. HTN regimen was held in the setting of hypotension, which improved with IVF. Evaluated by PT who recommend home with PT vs. home with 24/7 assist. 68 year old male with a PMHx of pre-DM, CAD (calcium score 433), HTN, HLD, TBI, aortic dilation, recently treated for UTI, who presented to [facility] ED with c/o weakness a/w polyuria. In the ED, patient was hypotensive but otherwise hemodynamically stable. He was treated with IVF. Labs with elevated lactate and troponin which subsequently downtrended. Glucose > 200. UA negative, RVP negative. CXR with no acute pathology. Patient was admitted for possible new onset DM and elevated troponin. Followed by cardiology, troponin stable. TTE unchanged from prior. A1C returned at 5.8 consistent with prediabetes. Glucose monitored and treated per ISS. HTN regimen was held in the setting of hypotension, which improved with IVF. Evaluated by PT who recommend home with PT vs. home with 24/7 assist. Medically stable for dc home. 68 year old male with a PMHx of pre-DM, CAD (calcium score 433), HTN, HLD, TBI, aortic dilation, recently treated for UTI, who presented to [facility] ED with c/o weakness a/w polyuria. In the ED, patient was hypotensive but otherwise hemodynamically stable. He was treated with IVF. Labs with elevated lactate and troponin which subsequently downtrended. Glucose > 200. UA negative, RVP negative. CXR with no acute pathology. Patient was admitted for possible new onset DM and elevated troponin. Followed by cardiology, troponin stable. TTE unchanged from prior. A1C returned at 5.8 consistent with prediabetes. Glucose monitored and treated per ISS. HTN regimen was held in the setting of hypotension, which improved with IVF. Evaluated by PT who recommend home with PT vs. home with 24/7 assist. Medically stable for dc home. 68 year old male with a PMHx of pre-DM, CAD (calcium score 433), HTN, HLD, TBI, aortic dilation, recently treated for UTI, who presented to [facility] ED with c/o weakness a/w polyuria. In the ED, patient was hypotensive but otherwise hemodynamically stable. He was treated with IVF. Labs with elevated lactate and troponin which subsequently downtrended. Glucose > 200. UA negative, RVP negative. CXR with no acute pathology. Patient was admitted for possible new onset DM and elevated troponin. Followed by cardiology, troponin stable. TTE unchanged from prior. A1C returned at 5.8 consistent with prediabetes. Glucose monitored and treated per ISS. HTN regimen was held in the setting of hypotension, which improved with IVF. Evaluated by PT who now recommends [facility]. Stable for DC to [facility]. 68 year old male with a PMHx of pre-DM, CAD (calcium score 433), HTN, HLD, TBI, aortic dilation, recently treated for UTI, who presented to [facility] ED with c/o weakness a/w polyuria. In the ED, patient was hypotensive but otherwise hemodynamically stable. He was treated with IVF. Labs with elevated lactate and troponin which subsequently downtrended. Glucose > 200. UA negative, RVP negative. CXR with no acute pathology. Patient was admitted for possible new onset DM and elevated troponin. Followed by cardiology, troponin stable. TTE unchanged from prior. A1C returned at 5.8 consistent with prediabetes. Glucose monitored and treated per ISS. HTN regimen was held in the setting of hypotension, which improved with IVF. Evaluated by PT who now recommends [facility]. Stable for DC to [facility].

## 2023-12-07 NOTE — DISCHARGE NOTE PROVIDER - NSDCFUSCHEDAPPT_GEN_ALL_CORE_FT
[facility]  CARDIOLOGY [address]  Scheduled Appointment: 12/11/2023    Norma Munoz  [facility]  CARDIOLOGY [address]  Scheduled Appointment: 12/19/2023    [facility]  CARDIOLOGY [address]  Scheduled Appointment: 12/28/2023     [facility]  CARDIOLOGY [address]  Scheduled Appointment: 12/11/2023    Norma Munoz  [facility]  CARDIOLOGY [address]  Scheduled Appointment: 12/19/2023    [facility]  CARDIOLOGY [address]  Scheduled Appointment: 12/28/2023

## 2023-12-08 ENCOUNTER — TRANSCRIPTION ENCOUNTER (OUTPATIENT)
Age: 68
End: 2023-12-08

## 2023-12-08 VITALS
DIASTOLIC BLOOD PRESSURE: 85 MMHG | SYSTOLIC BLOOD PRESSURE: 126 MMHG | HEART RATE: 64 BPM | OXYGEN SATURATION: 93 % | TEMPERATURE: 99 F | RESPIRATION RATE: 18 BRPM

## 2023-12-08 LAB
GLUCOSE BLDC GLUCOMTR-MCNC: 89 MG/DL — SIGNIFICANT CHANGE UP (ref 70–99)
GLUCOSE BLDC GLUCOMTR-MCNC: 89 MG/DL — SIGNIFICANT CHANGE UP (ref 70–99)
GLUCOSE BLDC GLUCOMTR-MCNC: 91 MG/DL — SIGNIFICANT CHANGE UP (ref 70–99)
GLUCOSE BLDC GLUCOMTR-MCNC: 91 MG/DL — SIGNIFICANT CHANGE UP (ref 70–99)
GLUCOSE BLDC GLUCOMTR-MCNC: 98 MG/DL — SIGNIFICANT CHANGE UP (ref 70–99)
GLUCOSE BLDC GLUCOMTR-MCNC: 98 MG/DL — SIGNIFICANT CHANGE UP (ref 70–99)

## 2023-12-08 PROCEDURE — 85014 HEMATOCRIT: CPT

## 2023-12-08 PROCEDURE — 97163 PT EVAL HIGH COMPLEX 45 MIN: CPT

## 2023-12-08 PROCEDURE — 83605 ASSAY OF LACTIC ACID: CPT

## 2023-12-08 PROCEDURE — 36415 COLL VENOUS BLD VENIPUNCTURE: CPT

## 2023-12-08 PROCEDURE — 80048 BASIC METABOLIC PNL TOTAL CA: CPT

## 2023-12-08 PROCEDURE — 82330 ASSAY OF CALCIUM: CPT

## 2023-12-08 PROCEDURE — 99285 EMERGENCY DEPT VISIT HI MDM: CPT | Mod: 25

## 2023-12-08 PROCEDURE — 85027 COMPLETE CBC AUTOMATED: CPT

## 2023-12-08 PROCEDURE — 84132 ASSAY OF SERUM POTASSIUM: CPT

## 2023-12-08 PROCEDURE — 82435 ASSAY OF BLOOD CHLORIDE: CPT

## 2023-12-08 PROCEDURE — 96360 HYDRATION IV INFUSION INIT: CPT

## 2023-12-08 PROCEDURE — 87086 URINE CULTURE/COLONY COUNT: CPT

## 2023-12-08 PROCEDURE — 82947 ASSAY GLUCOSE BLOOD QUANT: CPT

## 2023-12-08 PROCEDURE — 80053 COMPREHEN METABOLIC PANEL: CPT

## 2023-12-08 PROCEDURE — 82009 KETONE BODYS QUAL: CPT

## 2023-12-08 PROCEDURE — 84295 ASSAY OF SERUM SODIUM: CPT

## 2023-12-08 PROCEDURE — 85025 COMPLETE CBC W/AUTO DIFF WBC: CPT

## 2023-12-08 PROCEDURE — 84484 ASSAY OF TROPONIN QUANT: CPT

## 2023-12-08 PROCEDURE — 82962 GLUCOSE BLOOD TEST: CPT

## 2023-12-08 PROCEDURE — 87637 SARSCOV2&INF A&B&RSV AMP PRB: CPT

## 2023-12-08 PROCEDURE — 82803 BLOOD GASES ANY COMBINATION: CPT

## 2023-12-08 PROCEDURE — 85610 PROTHROMBIN TIME: CPT

## 2023-12-08 PROCEDURE — 83036 HEMOGLOBIN GLYCOSYLATED A1C: CPT

## 2023-12-08 PROCEDURE — 71045 X-RAY EXAM CHEST 1 VIEW: CPT

## 2023-12-08 PROCEDURE — 83690 ASSAY OF LIPASE: CPT

## 2023-12-08 PROCEDURE — 70450 CT HEAD/BRAIN W/O DYE: CPT | Mod: MA

## 2023-12-08 PROCEDURE — 84100 ASSAY OF PHOSPHORUS: CPT

## 2023-12-08 PROCEDURE — 87040 BLOOD CULTURE FOR BACTERIA: CPT

## 2023-12-08 PROCEDURE — 93005 ELECTROCARDIOGRAM TRACING: CPT

## 2023-12-08 PROCEDURE — 85018 HEMOGLOBIN: CPT

## 2023-12-08 PROCEDURE — 99239 HOSP IP/OBS DSCHRG MGMT >30: CPT

## 2023-12-08 PROCEDURE — 81003 URINALYSIS AUTO W/O SCOPE: CPT

## 2023-12-08 PROCEDURE — 83735 ASSAY OF MAGNESIUM: CPT

## 2023-12-08 PROCEDURE — 93306 TTE W/DOPPLER COMPLETE: CPT

## 2023-12-08 PROCEDURE — 85730 THROMBOPLASTIN TIME PARTIAL: CPT

## 2023-12-08 RX ADMIN — HEPARIN SODIUM 5000 UNIT(S): 5000 INJECTION INTRAVENOUS; SUBCUTANEOUS at 17:32

## 2023-12-08 RX ADMIN — HEPARIN SODIUM 5000 UNIT(S): 5000 INJECTION INTRAVENOUS; SUBCUTANEOUS at 05:24

## 2023-12-08 RX ADMIN — SODIUM CHLORIDE 3 MILLILITER(S): 9 INJECTION INTRAMUSCULAR; INTRAVENOUS; SUBCUTANEOUS at 13:12

## 2023-12-08 RX ADMIN — Medication 81 MILLIGRAM(S): at 13:14

## 2023-12-08 RX ADMIN — Medication 20 MILLIGRAM(S): at 13:14

## 2023-12-08 NOTE — DISCHARGE NOTE NURSING/CASE MANAGEMENT/SOCIAL WORK - PATIENT PORTAL LINK FT
You can access the FollowMyHealth Patient Portal offered by [facility] by registering at the following website: [email or web address]. By joining [employer]’s FollowMyHealth portal, you will also be able to view your health information using other applications (apps) compatible with our system. You can access the FollowMyHealth Patient Portal offered by [facility] by registering at the following website: [email or web address]. By joining [employer]’s FollowMyHealth portal, you will also be able to view your health information using other applications (apps) compatible with our system.

## 2023-12-08 NOTE — DISCHARGE NOTE NURSING/CASE MANAGEMENT/SOCIAL WORK - NSDCPEFALRISK_GEN_ALL_CORE
For information on Fall & Injury Prevention, visit: [email or web address] OR  [email or web address] OR  https://www.cdc.gov/steadi/patient.html For information on Fall & Injury Prevention, visit: [email or web address] OR  [email or web address] OR  https://www.cdc.gov/steadi/patient.html

## 2023-12-08 NOTE — DISCHARGE NOTE NURSING/CASE MANAGEMENT/SOCIAL WORK - NSDCCRNAME_GEN_ALL_CORE_FT
PATIENT WILL BE GOING TO [facility] TODAY FOR SUBACUTE REHAB   LOCATED AT [address] [place] [address] VIA 5PM AMBULANCE. PATIENT WILL BE GOING TO [facility] TODAY FOR SUBACUTE REHAB   LOCATED AT [address] [place] [address] VIA 5PM AMBULANCE.

## 2023-12-10 LAB
CULTURE RESULTS: SIGNIFICANT CHANGE UP
SPECIMEN SOURCE: SIGNIFICANT CHANGE UP

## 2023-12-11 ENCOUNTER — APPOINTMENT (OUTPATIENT)
Dept: CARDIOLOGY | Facility: CLINIC | Age: 68
End: 2023-12-11

## 2023-12-13 RX ORDER — EZETIMIBE 10 MG/1
10 TABLET ORAL
Qty: 90 | Refills: 0 | Status: DISCONTINUED | COMMUNITY
Start: 2023-08-14 | End: 2023-12-13

## 2023-12-13 RX ORDER — AMLODIPINE BESYLATE 5 MG/1
5 TABLET ORAL DAILY
Qty: 90 | Refills: 1 | Status: DISCONTINUED | COMMUNITY
Start: 2023-11-03 | End: 2023-12-13

## 2023-12-13 RX ORDER — ALENDRONATE SODIUM 70 MG/1
70 TABLET ORAL
Qty: 12 | Refills: 3 | Status: DISCONTINUED | COMMUNITY
Start: 2023-06-05 | End: 2023-12-13

## 2023-12-13 RX ORDER — VALSARTAN AND HYDROCHLOROTHIAZIDE 160; 25 MG/1; MG/1
160-25 TABLET, FILM COATED ORAL DAILY
Qty: 90 | Refills: 1 | Status: DISCONTINUED | COMMUNITY
Start: 2020-01-08 | End: 2023-12-13

## 2023-12-19 ENCOUNTER — APPOINTMENT (OUTPATIENT)
Dept: CARDIOLOGY | Facility: CLINIC | Age: 68
End: 2023-12-19

## 2023-12-28 ENCOUNTER — APPOINTMENT (OUTPATIENT)
Dept: CARDIOLOGY | Facility: CLINIC | Age: 68
End: 2023-12-28

## 2024-02-02 PROBLEM — N40.0 BENIGN PROSTATIC HYPERPLASIA WITHOUT LOWER URINARY TRACT SYMPTOMS: Chronic | Status: ACTIVE | Noted: 2023-12-05

## 2024-02-13 NOTE — ED ADULT NURSE NOTE - NS ED NURSE PRESS ULCER LOCATION 11
Anesthesia Post-op Note    Patient: Moy Bhagat  Procedure(s) Performed: EXCISIONAL BIOPSY OF LEFT UVULA LESION (Left: Throat)  Anesthesia type: General    Vitals Value Taken Time   Temp 36.5 °C (97.7 °F) 02/13/24 0937   Pulse 96 02/13/24 0950   Resp 16 02/13/24 0950   SpO2 96 % 02/13/24 0950   /83 02/13/24 0950         Patient Location: Phase II  Post-op Vital Signs:stable  Level of Consciousness: participates in exam, awake, alert and oriented  Respiratory Status: spontaneous ventilation and room air  Cardiovascular blood pressure returned to baseline  Hydration: euvolemic  Pain Management: well controlled  Handoff: Handoff to receiving clinician was performed and questions were answered  Vomiting: none  Nausea: None  Airway Patency:patent  Post-op Assessment: awake, alert, appropriately conversant, or baseline, no complications, patient tolerated procedure well and no evidence of recall      No notable events documented.                      
Anesthesia Post-op Note    Patient: Moy Bhagat  Procedure(s) Performed: EXCISIONAL BIOPSY OF LEFT UVULA LESION (Left: Throat)  Anesthesia type: General    Vitals Value Taken Time   Temp 36.5 °C (97.7 °F) 02/13/24 0937   Pulse 97 02/13/24 0937   Resp 20 02/13/24 0937   SpO2 98 % 02/13/24 0937   /92 02/13/24 0937         Patient Location: PACU Phase 1  Post-op Vital Signs:stable  Level of Consciousness: participates in exam, awake, alert and oriented  Respiratory Status: spontaneous ventilation and face mask  Cardiovascular blood pressure returned to baseline  Hydration: euvolemic  Pain Management: well controlled  Handoff: Handoff to receiving clinician was performed and questions were answered  Vomiting: none  Nausea: None  Airway Patency:patent  Post-op Assessment: awake, alert, appropriately conversant, or baseline, no complications, patient tolerated procedure well and no evidence of recall      No notable events documented.                      
sacrum

## 2024-04-04 ENCOUNTER — APPOINTMENT (OUTPATIENT)
Dept: FAMILY MEDICINE | Facility: CLINIC | Age: 69
End: 2024-04-04

## 2024-04-25 ENCOUNTER — APPOINTMENT (OUTPATIENT)
Dept: CARDIOLOGY | Facility: CLINIC | Age: 69
End: 2024-04-25

## 2024-12-30 ENCOUNTER — NON-APPOINTMENT (OUTPATIENT)
Age: 69
End: 2024-12-30

## 2025-02-07 NOTE — H&P ADULT - ENMT
- Maintained dual antiplatelet therapy (aspirin and clopidogrel) pending cardiology re-evaluation.  - Continued clopidogrel.  - Refilled clopidogrel (1 refill).   details… mouth